# Patient Record
Sex: FEMALE | Race: WHITE | NOT HISPANIC OR LATINO | Employment: OTHER | URBAN - METROPOLITAN AREA
[De-identification: names, ages, dates, MRNs, and addresses within clinical notes are randomized per-mention and may not be internally consistent; named-entity substitution may affect disease eponyms.]

---

## 2023-08-01 ENCOUNTER — APPOINTMENT (OUTPATIENT)
Dept: RADIOLOGY | Facility: MEDICAL CENTER | Age: 68
DRG: 065 | End: 2023-08-01
Attending: EMERGENCY MEDICINE
Payer: COMMERCIAL

## 2023-08-01 ENCOUNTER — HOSPITAL ENCOUNTER (INPATIENT)
Facility: MEDICAL CENTER | Age: 68
LOS: 3 days | DRG: 065 | End: 2023-08-04
Attending: EMERGENCY MEDICINE | Admitting: HOSPITALIST
Payer: COMMERCIAL

## 2023-08-01 ENCOUNTER — APPOINTMENT (OUTPATIENT)
Dept: RADIOLOGY | Facility: MEDICAL CENTER | Age: 68
DRG: 065 | End: 2023-08-01
Attending: NURSE PRACTITIONER
Payer: COMMERCIAL

## 2023-08-01 DIAGNOSIS — I63.9 ACUTE CVA (CEREBROVASCULAR ACCIDENT) (HCC): ICD-10-CM

## 2023-08-01 DIAGNOSIS — I63.522 CEREBROVASCULAR ACCIDENT (CVA) DUE TO OCCLUSION OF LEFT ANTERIOR CEREBRAL ARTERY (HCC): ICD-10-CM

## 2023-08-01 DIAGNOSIS — I10 PRIMARY HYPERTENSION: ICD-10-CM

## 2023-08-01 PROBLEM — D69.6 THROMBOCYTOPENIA (HCC): Status: ACTIVE | Noted: 2023-08-01

## 2023-08-01 PROBLEM — E83.51 HYPOCALCEMIA: Status: ACTIVE | Noted: 2023-08-01

## 2023-08-01 LAB
ABO + RH BLD: NORMAL
ABO GROUP BLD: NORMAL
ALBUMIN SERPL BCP-MCNC: 3.6 G/DL (ref 3.2–4.9)
ALBUMIN/GLOB SERPL: 1.6 G/DL
ALP SERPL-CCNC: 77 U/L (ref 30–99)
ALT SERPL-CCNC: 18 U/L (ref 2–50)
AMPHET UR QL SCN: NEGATIVE
ANION GAP SERPL CALC-SCNC: 10 MMOL/L (ref 7–16)
APTT PPP: 23.4 SEC (ref 24.7–36)
AST SERPL-CCNC: 16 U/L (ref 12–45)
BARBITURATES UR QL SCN: NEGATIVE
BASOPHILS # BLD AUTO: 0.5 % (ref 0–1.8)
BASOPHILS # BLD: 0.04 K/UL (ref 0–0.12)
BENZODIAZ UR QL SCN: NEGATIVE
BILIRUB SERPL-MCNC: 0.4 MG/DL (ref 0.1–1.5)
BLD GP AB SCN SERPL QL: NORMAL
BUN SERPL-MCNC: 15 MG/DL (ref 8–22)
BZE UR QL SCN: NEGATIVE
CALCIUM ALBUM COR SERPL-MCNC: 8.5 MG/DL (ref 8.5–10.5)
CALCIUM SERPL-MCNC: 8.2 MG/DL (ref 8.5–10.5)
CANNABINOIDS UR QL SCN: NEGATIVE
CHLORIDE SERPL-SCNC: 111 MMOL/L (ref 96–112)
CO2 SERPL-SCNC: 24 MMOL/L (ref 20–33)
CREAT SERPL-MCNC: 0.64 MG/DL (ref 0.5–1.4)
EKG IMPRESSION: NORMAL
EOSINOPHIL # BLD AUTO: 0.11 K/UL (ref 0–0.51)
EOSINOPHIL NFR BLD: 1.3 % (ref 0–6.9)
ERYTHROCYTE [DISTWIDTH] IN BLOOD BY AUTOMATED COUNT: 41.9 FL (ref 35.9–50)
FENTANYL UR QL: NEGATIVE
GFR SERPLBLD CREATININE-BSD FMLA CKD-EPI: 97 ML/MIN/1.73 M 2
GLOBULIN SER CALC-MCNC: 2.2 G/DL (ref 1.9–3.5)
GLUCOSE SERPL-MCNC: 143 MG/DL (ref 65–99)
HCT VFR BLD AUTO: 44.7 % (ref 37–47)
HGB BLD-MCNC: 14.8 G/DL (ref 12–16)
IMM GRANULOCYTES # BLD AUTO: 0.06 K/UL (ref 0–0.11)
IMM GRANULOCYTES NFR BLD AUTO: 0.7 % (ref 0–0.9)
INR PPP: 1.04 (ref 0.87–1.13)
LYMPHOCYTES # BLD AUTO: 1.05 K/UL (ref 1–4.8)
LYMPHOCYTES NFR BLD: 12 % (ref 22–41)
MCH RBC QN AUTO: 30.1 PG (ref 27–33)
MCHC RBC AUTO-ENTMCNC: 33.1 G/DL (ref 32.2–35.5)
MCV RBC AUTO: 90.9 FL (ref 81.4–97.8)
METHADONE UR QL SCN: NEGATIVE
MONOCYTES # BLD AUTO: 0.47 K/UL (ref 0–0.85)
MONOCYTES NFR BLD AUTO: 5.4 % (ref 0–13.4)
NEUTROPHILS # BLD AUTO: 7.02 K/UL (ref 1.82–7.42)
NEUTROPHILS NFR BLD: 80.1 % (ref 44–72)
NRBC # BLD AUTO: 0 K/UL
NRBC BLD-RTO: 0 /100 WBC (ref 0–0.2)
OPIATES UR QL SCN: NEGATIVE
OXYCODONE UR QL SCN: NEGATIVE
PCP UR QL SCN: NEGATIVE
PLATELET # BLD AUTO: 156 K/UL (ref 164–446)
PMV BLD AUTO: 10.1 FL (ref 9–12.9)
POTASSIUM SERPL-SCNC: 3.6 MMOL/L (ref 3.6–5.5)
PROPOXYPH UR QL SCN: NEGATIVE
PROT SERPL-MCNC: 5.8 G/DL (ref 6–8.2)
PROTHROMBIN TIME: 13.5 SEC (ref 12–14.6)
RBC # BLD AUTO: 4.92 M/UL (ref 4.2–5.4)
RH BLD: NORMAL
SODIUM SERPL-SCNC: 145 MMOL/L (ref 135–145)
TROPONIN T SERPL-MCNC: 12 NG/L (ref 6–19)
WBC # BLD AUTO: 8.8 K/UL (ref 4.8–10.8)

## 2023-08-01 PROCEDURE — 85025 COMPLETE CBC W/AUTO DIFF WBC: CPT

## 2023-08-01 PROCEDURE — 99222 1ST HOSP IP/OBS MODERATE 55: CPT | Mod: FS | Performed by: NURSE PRACTITIONER

## 2023-08-01 PROCEDURE — 71045 X-RAY EXAM CHEST 1 VIEW: CPT

## 2023-08-01 PROCEDURE — 86901 BLOOD TYPING SEROLOGIC RH(D): CPT

## 2023-08-01 PROCEDURE — 93005 ELECTROCARDIOGRAM TRACING: CPT | Performed by: EMERGENCY MEDICINE

## 2023-08-01 PROCEDURE — 700117 HCHG RX CONTRAST REV CODE 255: Performed by: EMERGENCY MEDICINE

## 2023-08-01 PROCEDURE — 94760 N-INVAS EAR/PLS OXIMETRY 1: CPT

## 2023-08-01 PROCEDURE — 770020 HCHG ROOM/CARE - TELE (206)

## 2023-08-01 PROCEDURE — 70498 CT ANGIOGRAPHY NECK: CPT

## 2023-08-01 PROCEDURE — 80307 DRUG TEST PRSMV CHEM ANLYZR: CPT

## 2023-08-01 PROCEDURE — A9270 NON-COVERED ITEM OR SERVICE: HCPCS | Performed by: NURSE PRACTITIONER

## 2023-08-01 PROCEDURE — 85610 PROTHROMBIN TIME: CPT

## 2023-08-01 PROCEDURE — 99223 1ST HOSP IP/OBS HIGH 75: CPT | Mod: AI | Performed by: HOSPITALIST

## 2023-08-01 PROCEDURE — 86900 BLOOD TYPING SEROLOGIC ABO: CPT

## 2023-08-01 PROCEDURE — 84484 ASSAY OF TROPONIN QUANT: CPT

## 2023-08-01 PROCEDURE — 85730 THROMBOPLASTIN TIME PARTIAL: CPT

## 2023-08-01 PROCEDURE — 36415 COLL VENOUS BLD VENIPUNCTURE: CPT

## 2023-08-01 PROCEDURE — 70450 CT HEAD/BRAIN W/O DYE: CPT

## 2023-08-01 PROCEDURE — 70551 MRI BRAIN STEM W/O DYE: CPT

## 2023-08-01 PROCEDURE — 70496 CT ANGIOGRAPHY HEAD: CPT

## 2023-08-01 PROCEDURE — 0042T CT-CEREBRAL PERFUSION ANALYSIS: CPT

## 2023-08-01 PROCEDURE — 80053 COMPREHEN METABOLIC PANEL: CPT

## 2023-08-01 PROCEDURE — 700102 HCHG RX REV CODE 250 W/ 637 OVERRIDE(OP): Performed by: NURSE PRACTITIONER

## 2023-08-01 PROCEDURE — 99285 EMERGENCY DEPT VISIT HI MDM: CPT

## 2023-08-01 PROCEDURE — 700111 HCHG RX REV CODE 636 W/ 250 OVERRIDE (IP): Performed by: NURSE PRACTITIONER

## 2023-08-01 PROCEDURE — 86850 RBC ANTIBODY SCREEN: CPT

## 2023-08-01 RX ORDER — SODIUM CHLORIDE 9 MG/ML
500 INJECTION, SOLUTION INTRAVENOUS
Status: DISCONTINUED | OUTPATIENT
Start: 2023-08-01 | End: 2023-08-04 | Stop reason: HOSPADM

## 2023-08-01 RX ORDER — LABETALOL HYDROCHLORIDE 5 MG/ML
10 INJECTION, SOLUTION INTRAVENOUS
Status: DISCONTINUED | OUTPATIENT
Start: 2023-08-01 | End: 2023-08-02

## 2023-08-01 RX ORDER — ATORVASTATIN CALCIUM 80 MG/1
80 TABLET, FILM COATED ORAL EVERY EVENING
Status: DISCONTINUED | OUTPATIENT
Start: 2023-08-01 | End: 2023-08-04 | Stop reason: HOSPADM

## 2023-08-01 RX ORDER — HYDRALAZINE HYDROCHLORIDE 20 MG/ML
10 INJECTION INTRAMUSCULAR; INTRAVENOUS
Status: DISCONTINUED | OUTPATIENT
Start: 2023-08-01 | End: 2023-08-02

## 2023-08-01 RX ORDER — CHLORAL HYDRATE 500 MG
1000 CAPSULE ORAL DAILY
COMMUNITY

## 2023-08-01 RX ORDER — ASPIRIN 300 MG/1
300 SUPPOSITORY RECTAL DAILY
Status: DISCONTINUED | OUTPATIENT
Start: 2023-08-02 | End: 2023-08-02

## 2023-08-01 RX ORDER — LAMOTRIGINE 150 MG/1
150 TABLET ORAL DAILY
COMMUNITY
Start: 2023-06-16 | End: 2023-12-19 | Stop reason: SDUPTHER

## 2023-08-01 RX ORDER — CALCIUM CARBONATE 500 MG/1
500 TABLET, CHEWABLE ORAL DAILY
Status: ACTIVE | OUTPATIENT
Start: 2023-08-01 | End: 2023-08-03

## 2023-08-01 RX ORDER — LAMOTRIGINE 100 MG/1
150 TABLET ORAL EVERY EVENING
Status: DISCONTINUED | OUTPATIENT
Start: 2023-08-01 | End: 2023-08-04 | Stop reason: HOSPADM

## 2023-08-01 RX ORDER — ASPIRIN 325 MG
325 TABLET ORAL ONCE
Status: COMPLETED | OUTPATIENT
Start: 2023-08-01 | End: 2023-08-01

## 2023-08-01 RX ORDER — LORAZEPAM 2 MG/ML
1 INJECTION INTRAMUSCULAR ONCE
Status: COMPLETED | OUTPATIENT
Start: 2023-08-01 | End: 2023-08-01

## 2023-08-01 RX ORDER — ASPIRIN 81 MG/1
81 TABLET, CHEWABLE ORAL DAILY
Status: DISCONTINUED | OUTPATIENT
Start: 2023-08-02 | End: 2023-08-02

## 2023-08-01 RX ADMIN — LORAZEPAM 1 MG: 2 INJECTION INTRAMUSCULAR; INTRAVENOUS at 22:15

## 2023-08-01 RX ADMIN — IOHEXOL 40 ML: 350 INJECTION, SOLUTION INTRAVENOUS at 09:19

## 2023-08-01 RX ADMIN — ASPIRIN 325 MG: 325 TABLET ORAL at 10:05

## 2023-08-01 RX ADMIN — IOHEXOL 80 ML: 350 INJECTION, SOLUTION INTRAVENOUS at 09:20

## 2023-08-01 ASSESSMENT — PAIN DESCRIPTION - PAIN TYPE
TYPE: ACUTE PAIN
TYPE: ACUTE PAIN

## 2023-08-01 ASSESSMENT — ENCOUNTER SYMPTOMS
EYES NEGATIVE: 1
BRUISES/BLEEDS EASILY: 0
NEUROLOGICAL NEGATIVE: 1
WEIGHT LOSS: 0
CHILLS: 0
FOCAL WEAKNESS: 0
DEPRESSION: 0
COUGH: 0
CARDIOVASCULAR NEGATIVE: 1
VOMITING: 0
NAUSEA: 0
PSYCHIATRIC NEGATIVE: 1
BLURRED VISION: 0
CONSTITUTIONAL NEGATIVE: 1
MYALGIAS: 0
DIZZINESS: 0
ABDOMINAL PAIN: 0
FEVER: 0
PALPITATIONS: 0
WEAKNESS: 0
HEADACHES: 0
RESPIRATORY NEGATIVE: 1
GASTROINTESTINAL NEGATIVE: 1
MUSCULOSKELETAL NEGATIVE: 1
SHORTNESS OF BREATH: 0
DIAPHORESIS: 0
SORE THROAT: 0

## 2023-08-01 ASSESSMENT — LIFESTYLE VARIABLES: SUBSTANCE_ABUSE: 0

## 2023-08-01 NOTE — ED TRIAGE NOTES
"Chief Complaint   Patient presents with    Extremity Weakness     Pt woke today at 07:15 with RUE and RLE weakness, LKW last night approx 23:00, RUE symptoms have improved on arrival and only has mild drift, pt is unable to move RLE, sensation intact.      Pt BIB [facility] for above complaint, VSS on 2L NC, GCS 15, NAD.     Hx TIA/stroke and epilepsy, pt takes lamictal     BP (!) 160/88   Pulse 65   Temp 36.6 °C (97.8 °F) (Temporal)   Resp 16   Ht 1.702 m (5' 7\")   Wt 70.8 kg (156 lb)   SpO2 95%   BMI 24.43 kg/m²     "

## 2023-08-01 NOTE — ASSESSMENT & PLAN NOTE
Acute right sided weakness  improving  Neurology following  Started on Eliquis  Started on Lipitor 80  Continuous cardiac monitoring and 2D echo  Mri brain reveals acute infarct in left medial frontal region in the left posterior CAROL ANN distribution  Serial neuro exams  Speech, PT and OT to eval

## 2023-08-01 NOTE — CONSULTS
Chief Complaint   Patient presents with    Extremity Weakness     Pt woke today at 07:15 with RUE and RLE weakness, LKW last night approx 23:00, RUE symptoms have improved on arrival and only has mild drift, pt is unable to move RLE, sensation intact.        Problem List Items Addressed This Visit    None  Visit Diagnoses       Cerebrovascular accident (CVA) due to occlusion of left anterior cerebral artery (HCC)                Neurology Stroke Alert Consultation     History of present illness:  This is a 67-year old female with PMHx significant for seizure disorder (reportedly nocturnal seizures) secondary to Right frontal stroke (2016; on Lamictal), dyslipidemia, further details limited who presented to [facility] on 8/[facility]/23 for a chief complaint of Right LE > Right UE weakness. The patient states that she went to sleep in her usual state of health last night at 200; when she awoke at 0715, she had difficulty moving her RUE/RLE. She called EMS; on scene BG WNL, SBP 120s. She was reportedly profoundly weak to both RUE/RLE; en route symptoms to RUE improved; RLE remains nearly flaccid at time of arrival. Here, Stat CT head revealed no acute intracranial abnormality; did note encephalomalacia to Right frontal region consistent with chronic stroke. CTA head/neck with no obvious LVO nor hemodynamically significant stenosis; CT perfusion study with small perfusion mismatch well correlating to RLE weakness/Left CAROL ANN territory; unfortunately patient not a candidate for IV thrombolytics secondary to presentation time greater than 4.5 hours from time LKW; no IR intervention given no LVO. Currently patient is sitting up in bed; awake and alert; still with RLE>RUE weakness. She denies numbness, problem with vision, speech or swallowing, headache or dizziness.     Neurology has been consulted by Dr. Bimal Ramon to further evaluate the findings noted above.     Past medical history:   Past Medical History:   Diagnosis  Date    Hyperlipidemia     Muscle disorder     Neuropathy (HCC)     from right shoulder injury    Seizure disorder (HCC)     10/25/2019     Stroke (HCC)     01/28/2019     Substance abuse (HCC)        Past surgical history:   Past Surgical History:   Procedure Laterality Date    ME COLONOSCOPY,DIAGNOSTIC N/A 9/28/2021    Procedure: COLONOSCOPY;  Surgeon: Nir Hemphill M.D.;  Location: SURGERY [facility];  Service: Gastroenterology    ME NEUROPLASTY & OR TRANSPOS MEDIAN NRV CARPAL KEVIN Left 1/21/2020    Procedure: Left carpal tunnel release.;  Surgeon: Nir Marin M.D.;  Location: SURGERY [facility];  Service: Orthopedics       Family history:   Family History   Problem Relation Age of Onset    Hypertension Mother     Dementia Mother     Heart Failure Mother     Heart Failure Father     Heart Attack Father     Kidney Disease Father     Hypertension Father        Social history:   Social History     Socioeconomic History    Marital status:      Spouse name: Not on file    Number of children: Not on file    Years of education: Not on file    Highest education level: Not on file   Occupational History    Not on file   Tobacco Use    Smoking status: Never    Smokeless tobacco: Never   Vaping Use    Vaping Use: Never used   Substance and Sexual Activity    Alcohol use: Yes    Drug use: Yes     Types: Inhaled     Comment: Marijuana    Sexual activity: Not on file   Other Topics Concern    Not on file   Social History Narrative    Not on file     Social Determinants of Health     Financial Resource Strain: Not on file   Food Insecurity: Not on file   Transportation Needs: Not on file   Physical Activity: Not on file   Stress: Not on file   Social Connections: Not on file   Intimate Partner Violence: Not on file   Housing Stability: Not on file       Current medications:   No current facility-administered medications for this encounter.     Current Outpatient Medications   Medication    Omega-3 Fatty Acids  "(FISH OIL) 1000 MG Cap capsule    VITAMIN A PO    B Complex Vitamins (VITAMIN B COMPLEX PO)    VITAMIN D PO    Ascorbic Acid (VITAMIN C PO)    multivitamin Tab    lamotrigine (LAMICTAL) 150 MG tablet       Medication Allergy:  No Known Allergies    Review of systems:   Constitutional: denies fever, night sweats, weight loss.   Eyes: denies acute vision change, eye pain or secretion.   Ears, Nose, Mouth, Throat: denies nasal secretion, nasal bleeding, difficulty swallowing, hearing loss, tinnitus, vertigo, ear pain, acute dental problems, oral ulcers or lesions.   Endocrine: denies recent weight changes, heat or cold intolerance, polyuria, polydypsia, polyphagia,abnormal hair growth.  Cardiovascular: denies new onset of chest pain, palpitations, syncope, or dyspnea of exertion.  Pulmonary: denies shortness of breath, new onset of cough, hemoptysis, wheezing, chest pain or flu-like symptoms.   GI: denies nausea, vomiting, diarrhea, GI bleeding, change in appetite, abdominal pain, and change in bowel habits.  : denies dysuria, urinary incontinence, hematuria.  Heme/oncology: denies history of easy bruising or bleeding. No history of cancer, DVTor PE.  Allergy/immunology: denies hives/urticaria, or itching.   Dermatologic: denies new rash, or new skin lesions.  Musculoskeletal:denies joint swelling or pain, muscle pain, neck and back pain.   Neurologic: As noted in detail above.   Psychiatric: denies symptoms of depression, anxiety, hallucinations, mood swings or changes, suicidal or homicidal thoughts.     Physical examination:   Vitals:    08/01/23 0903 08/01/23 0905 08/01/23 0914 08/01/23 1003   BP:  (!) 160/88 (!) 183/88 (!) 170/83   Pulse:  65 75 70   Resp:  16     Temp:  36.6 °C (97.8 °F)     TempSrc:  Temporal     SpO2:  95% 95% 95%   Weight: 70.8 kg (156 lb)      Height: 1.702 m (5' 7\")        General: Patient in no acute distress.  HEENT: Normocephalic, no signs of acute trauma.   Neck: supple, no meningeal " signs or carotid bruits. There is normal range of motion. No tenderness on exam.   Chest: clear to auscultation. No cough.   CV: RRR, no murmurs.   Skin: no signs of acute rashes or trauma.   Musculoskeletal: joints exhibit full range of motion, without any pain to palpation. There are no signs of joint or muscle swelling. There is no tenderness to deep palpation of muscles.   Psychiatric: No hallucinatory behavior.       NEUROLOGICAL EXAM:   Mental status, orientation: Awake, alert and fully oriented.   Speech and language: speech is clear and fluent. The patient is able to name, repeat and comprehend.    Cranial nerve exam: Pupils are 3-4 mm bilaterally and equally reactive to light. Visual fields are intact by confrontation. There is no nystagmus on primary or secondary gaze. Intact full EOM in all directions of gaze.  Face appears symmetric. Sensation in the face is intact to light touch. Uvula is midline. Palate elevates symmetrically. Tongue is midline and without any signs of tongue biting or fasciculations. Shoulder shrug is intact bilaterally.   Motor exam: Strength is 5/5 in LUE/LLE; 4+/5 to RUE with slight drift; 2+/5 to RLE, not antigravity. Tone is normal. No abnormal movements were seen on exam.   Sensory exam reveals normal sense of light touch and pinprick in all extremities.   Deep tendon reflexes:  Plantar responses are flexor. There is no clonus.   Coordination: shows a normal finger-nose-finger.   Gait:Not assessed at this time as patient is a fall risk.       NIH Stroke Scale    1a Level of Consciousness   1b Orientation Questions   1c Response to Commands   2 Gaze   3 Visual Fields   4 Facial Movement   5 Motor Function (arm)   a Left   b Right 1  6 Motor Function (leg)   a Left   b Right 3  7 Limb Ataxia   8 Sensory   9 Language   10 Articulation   11 Extinction/Inattention     Score: 4        ANCILLARY DATA REVIEWED:     Lab Data Review:  Recent Results (from the past 24 hour(s))   CBC WITH  DIFFERENTIAL    Collection Time: 08/01/23  8:55 AM   Result Value Ref Range    WBC 8.8 4.8 - 10.8 K/uL    RBC 4.92 4.20 - 5.40 M/uL    Hemoglobin 14.8 12.0 - 16.0 g/dL    Hematocrit 44.7 37.0 - 47.0 %    MCV 90.9 81.4 - 97.8 fL    MCH 30.1 27.0 - 33.0 pg    MCHC 33.1 32.2 - 35.5 g/dL    RDW 41.9 35.9 - 50.0 fL    Platelet Count 156 (L) 164 - 446 K/uL    MPV 10.1 9.0 - 12.9 fL    Neutrophils-Polys 80.10 (H) 44.00 - 72.00 %    Lymphocytes 12.00 (L) 22.00 - 41.00 %    Monocytes 5.40 0.00 - 13.40 %    Eosinophils 1.30 0.00 - 6.90 %    Basophils 0.50 0.00 - 1.80 %    Immature Granulocytes 0.70 0.00 - 0.90 %    Nucleated RBC 0.00 0.00 - 0.20 /100 WBC    Neutrophils (Absolute) 7.02 1.82 - 7.42 K/uL    Lymphs (Absolute) 1.05 1.00 - 4.80 K/uL    Monos (Absolute) 0.47 0.00 - 0.85 K/uL    Eos (Absolute) 0.11 0.00 - 0.51 K/uL    Baso (Absolute) 0.04 0.00 - 0.12 K/uL    Immature Granulocytes (abs) 0.06 0.00 - 0.11 K/uL    NRBC (Absolute) 0.00 K/uL   COMP METABOLIC PANEL    Collection Time: 08/01/23  8:55 AM   Result Value Ref Range    Sodium 145 135 - 145 mmol/L    Potassium 3.6 3.6 - 5.5 mmol/L    Chloride 111 96 - 112 mmol/L    Co2 24 20 - 33 mmol/L    Anion Gap 10.0 7.0 - 16.0    Glucose 143 (H) 65 - 99 mg/dL    Bun 15 8 - 22 mg/dL    Creatinine 0.64 0.50 - 1.40 mg/dL    Calcium 8.2 (L) 8.5 - 10.5 mg/dL    Correct Calcium 8.5 8.5 - 10.5 mg/dL    AST(SGOT) 16 12 - 45 U/L    ALT(SGPT) 18 2 - 50 U/L    Alkaline Phosphatase 77 30 - 99 U/L    Total Bilirubin 0.4 0.1 - 1.5 mg/dL    Albumin 3.6 3.2 - 4.9 g/dL    Total Protein 5.8 (L) 6.0 - 8.2 g/dL    Globulin 2.2 1.9 - 3.5 g/dL    A-G Ratio 1.6 g/dL   PROTHROMBIN TIME    Collection Time: 08/01/23  8:55 AM   Result Value Ref Range    PT 13.5 12.0 - 14.6 sec    INR 1.04 0.87 - 1.13   APTT    Collection Time: 08/01/23  8:55 AM   Result Value Ref Range    APTT 23.4 (L) 24.7 - 36.0 sec   COD (ADULT)    Collection Time: 08/01/23  8:55 AM   Result Value Ref Range    ABO Grouping Only A      Rh Grouping Only POS     Antibody Screen-Cod NEG    TROPONIN    Collection Time: 23  8:55 AM   Result Value Ref Range    Troponin T 12 6 - 19 ng/L   ESTIMATED GFR    Collection Time: 23  8:55 AM   Result Value Ref Range    GFR (CKD-EPI) 97 >60 mL/min/1.73 m 2   EKG (NOW)    Collection Time: 23  9:52 AM   Result Value Ref Range    Report       [facility] Emergency Dept[facility]    Test Date:  2023  Pt Name:    YOLY BOGGS                 Department: ER  MRN:        [id number]                      Room:        10  Gender:     Female                       Technician: [id number]  :        [id number]                   Requested By:JACOB AMOS  Order #:    [id number]                    Reading MD:    Measurements  Intervals                                Axis  Rate:       66                           P:          74  WV:         196                          QRS:        54  QRSD:       96                           T:          49  QT:         433  QTc:        454    Interpretive Statements  Sinus rhythm  Atrial premature complex  Probable left ventricular hypertrophy  No previous ECG available for comparison     ABO Rh Confirm    Collection Time: 23  9:57 AM   Result Value Ref Range    ABO Rh Confirm A POS        Labs reviewed by me.         Imaging reviewed by me:     DX-CHEST-PORTABLE (1 VIEW)   Final Result         Linear left basilar atelectasis      CT-CTA NECK WITH & W/O-POST PROCESSING   Final Result      CT angiogram of the neck within normal limits.      CT-CTA HEAD WITH & W/O-POST PROCESS   Final Result      1.  Occlusion of distal left pericallosal artery.      2.  Moderate size chronic area of infarction in right anterior frontal lobe.      3.  Case discussed with Dr. Ahmadi at 9:35 AM 2023.      CT-CEREBRAL PERFUSION ANALYSIS   Final Result      1.  Cerebral blood flow less than 30% likely representing completed infarct = 0 mL.      2.  T Max more than 6  seconds likely representing combination of completed infarct and ischemia = 3 mL.      3.  Mismatched volume likely representing ischemic brain/penumbra = 3      4.  Please note that the cerebral perfusion was performed on the limited brain tissue around the basal ganglia region. Infarct/ischemia outside the CT perfusion sections can be missed in this study.      CT-HEAD W/O   Final Result      1.  Cerebral atrophy.      2.  White matter lucencies most consistent with small vessel ischemic change versus demyelination or gliosis.      3. Moderate wedge-shaped area of chronic infarction in the right anterior frontal lobe.         MR-BRAIN-W/O    (Results Pending)         Presumed mechanism by TOAST:  __Large Artery Atherosclerosis  __Small Vessel (Lacunar)  __Cardioembolic  __Other (Sickle Cell, Vasculitis, Hypercoagulable)  _X_Unknown    Modified Louisa Scale (MRS): 1 = No significant disability, despite symptoms; able to perform all usual duties and activities      ASSESSMENT AND PLAN:  67-year old female with PMHx significant for seizure disorder (reportedly nocturnal seizures) secondary to Right frontal stroke (2016; on Lamictal), dyslipidemia, further details limited who presented to [facility] on 8/1/23 for a chief complaint of Right LE > Right UE weakness; LKW 2300/bed time last night. Here, CT head revealed no acute intracranial abnormality; did note encephalomalacia to Right frontal region consistent with chronic stroke. CTA head/neck with no obvious LVO nor hemodynamically significant stenosis; CT perfusion study with small perfusion mismatch well correlating to RLE weakness/Left CAROL ANN territory; unfortunately patient not a candidate for IV thrombolytics secondary to presentation time greater than 4.5 hours from time LKW; no IR intervention given no LVO. Plan for admission for MRI Brain, stroke work up; will likely/tentatively plan to start empiric anticoagulation in coming 1-2 days given presumed  second cortical/embolic appearing stroke (note, patient admits to non compliance with ASA/Statin at home).     Recommendations/Plan:    -q4h and PRN neuro assessment. VS per nursing/unit protocol. BP goal is normotension, 100-130/60-80. Antihypertensives per primary team.   -Obtain MRI Brain wo contrast.   -Telemetry; currently SR. Screen for Afib/arrhythmia. Obtain TTE.  - mg PO q once now. Likely/tentative plan to start Eliquis 5 mg PO BID tomorrow or 8/3.    -Atorvastatin 80 mg PO q HS. Check lipid panel.   -Recommend aggressive BG management per primary team. Avoid IVF with Dextrose. BG goal 140-180. Check hemoglobin A1c.   -PT/OT/SLP eval and treat. Physiatry consult.   -Counseled patient at length regarding life style and risk factor modification for secondary stroke prevention, including importance of medication compliance.   -Continue home dose Lamictal for seizure ppx.   -All other medical management per primary team.   -DVT PPX: SCDs.      The plan of care above has been discussed with Dr. Russ Laura. Please call with questions.     JAYLA Martinez.  [facility] [employer]

## 2023-08-01 NOTE — ED NOTES
Neuro assessment complete, no changes since 10am. Patient updated on POC, VSS, NAD, denies any needs. Call light in hand.

## 2023-08-01 NOTE — ED NOTES
Family @ bedside. Pt remains calm, cooperative and appears to be resting comfortably. Pt has no additional requests at this time.

## 2023-08-01 NOTE — DISCHARGE PLANNING
[facility] Transitional Care Coordination    Referral from: Dr Bello  Insurance Provider on Facesheet: [facility]  Potential Rehab Diagnosis: Stroke    Chart review indicates patient may have on going medical management and may have therapy needs to possibly meet inpatient rehab facility criteria with the goal of returning to community.    D/C support: TBD     Physiatry consultation pended per protocol.     R/o stroke pending MRI and therapy evals as appropriate, TCC will follow.     Thank you for the referral.

## 2023-08-01 NOTE — ED PROVIDER NOTES
ED Provider Note  CHIEF COMPLAINT  Chief Complaint   Patient presents with    Extremity Weakness     Pt woke today at 07:15 with RUE and RLE weakness, LKW last night approx 23:00, RUE symptoms have improved on arrival and only has mild drift, pt is unable to move RLE, sensation intact.        HPI  Doreen Noe is a 67 y.o. female who presents for evaluation of of right lower extremity weakness which was present upon waking this morning around 7:15 AM.  Last known well was around 11:00 last night when the patient went to bed.  She notes she woke up and was unable to move her right lower extremity.  EMS found the patient's leg to be flaccid and they also thought there might be some right upper extremity weakness as well.  She was speaking normally and swallowing normally.  She had no facial droop but was medevac from her home, approximately an hour and a half by air, directly here for stroke evaluation.  EXTERNAL RECORDS REVIEWED  Reviewed office visit to [facility] for seizures May 2023.  Patient currently on Lamictal 150 mg daily and apparently had the CVA around 8 years ago.  She has a residual tremor on the left side of her body from the stroke.  ROS  Constitutional: No fevers or chills  Skin: No rashes  HEENT: No sore throat, runny nose  Neck: No neck pain  Chest: No pain or rashes  Pulm: No shortness of breath, cough, wheezing, stridor, or pain with inspiration/expiration  Gastrointestinal: No nausea, vomiting, diarrhea, constipation, bloating, melena, hematochezia or abdominal pain.  Genitourinary: No dysuria or hematuria  Musculoskeletal: No pain, swelling, or weakness  Neurologic: Right lower extremity weakness.  No confusion or disorientation.  Heme: No bleeding or bruising problems.   Immuno: No hx of recurrent infections        LIMITATION TO HISTORY   None  OUTSIDE HISTORIAN(S):  EMS/flight crew        PAST FAM HISTORY  Family History   Problem Relation Age of Onset    Hypertension Mother  "    Dementia Mother     Heart Failure Mother     Heart Failure Father     Heart Attack Father     Kidney Disease Father     Hypertension Father        PAST MEDICAL HISTORY   has a past medical history of Hyperlipidemia, Muscle disorder, Neuropathy (HCC), Seizure disorder (HCC), Stroke (HCC), Substance abuse (HCC), and Thrombocytopenia (HCC) (8/1/2023).    SOCIAL HISTORY  Social History     Tobacco Use    Smoking status: Never    Smokeless tobacco: Never   Vaping Use    Vaping Use: Never used   Substance and Sexual Activity    Alcohol use: Yes    Drug use: Yes     Types: Inhaled     Comment: Marijuana    Sexual activity: Not on file       SURGICAL HISTORY   has a past surgical history that includes neuroplasty & or transpos median nrv carpal gerardo (Left, 1/21/2020) and colonoscopy,diagnostic (N/A, 9/28/2021).    CURRENT MEDICATIONS  Home Medications       Reviewed by Christine House (Pharmacy Tech) on 08/01/23 at 0954  Med List Status: Complete     Medication Last Dose Status   Ascorbic Acid (VITAMIN C PO) 7/31/2023 Active   B Complex Vitamins (VITAMIN B COMPLEX PO) 7/31/2023 Active   lamotrigine (LAMICTAL) 150 MG tablet 7/31/2023 Active   multivitamin Tab 7/31/2023 Active   Omega-3 Fatty Acids (FISH OIL) 1000 MG Cap capsule 7/31/2023 Active   VITAMIN A PO 7/31/2023 Active   VITAMIN D PO 7/31/2023 Active                     ALLERGIES  No Known Allergies    PHYSICAL EXAM  VITAL SIGNS: BP (!) 196/88   Pulse 63   Temp 36.6 °C (97.8 °F) (Temporal)   Resp 16   Ht 1.702 m (5' 7\")   Wt 70.8 kg (156 lb)   SpO2 93%   BMI 24.43 kg/m²    Gen: Alert in no apparent distress.  HEENT: No signs of trauma, Bilateral external ears normal, Nose normal. Conjunctiva normal, Non-icteric.   Neck:  No tenderness, Supple, No masses  Lymphatic: No cervical lymphadenopathy noted.   Cardiovascular: Regular rate and rhythm, no murmurs.  Capillary refill less than 3 seconds to all extremities, 2+ distal pulses.  Thorax & Lungs: Normal " breath sounds, No respiratory distress, No wheezing bilateral chest rise  Abdomen: Bowel sounds normal, Soft, No tenderness, No masses, No pulsatile masses. No Guarding or rebound  Skin: Warm, Dry, No erythema, No rash noted to exposed areas.   Back: No bony tenderness, No CVA tenderness.   Extremities: Intact distal pulses, No edema  Neurologic:   CN 2: Visual acuity grossly intact, visual fields grossly intact  CN 2-3: Pupils equal round reactive to light and accommodation  CN 3, 4, 6: Extraocular eye movements intact, no lid lag  CN 5: Facial sensation intact to light touch bilaterally  CN 7: Face symmetric, no droop  CN 8: Hearing intact to finger rub bilaterally  CN 9,10: Swallowing normally, soft palate elevates normally  CN 4, 7, 10, 12: Voice normal, no dysarthria  CN 11: Strong shoulder shrug, good strength with head rotation  CN 12: No deviation of the tongue    Motor:   RUE: 5 out of 5 strength proximally and distally, no pronator or arm drift  LUE:5 out of 5 strength proximally and distally, no pronator or arm drift  RLE:0 out of 5 strength proximally and distally  LLE:5 out of 5 strength proximally and distally, no leg drift    Sensory:  RUE: Sensation intact to light touch, equal bilat  RLE:  Sensation intact to light touch, equal bilat  LUE: Sensation intact to light touch, equal bilat  LLE: Sensation intact to light touch, equal bilat   Psychiatric: Affect pleasant    INITIAL IMPRESSION  Patient arrives for evaluation of appears to be isolated right lower extremity weakness.  It is fairly profound and she has 0 out of 5 strength.  She has an NIH of 4 based on initial evaluation at the charge desk and was taken directly to CT/CTA.  She was seen and evaluated by the stroke team at the charge desk as well.  Patient is well out of the timeframe for systemic thrombolysis by the time of arrival, at least by the last known well.  LABS  Results for orders placed or performed during the hospital encounter of  08/01/23   CBC WITH DIFFERENTIAL   Result Value Ref Range    WBC 8.8 4.8 - 10.8 K/uL    RBC 4.92 4.20 - 5.40 M/uL    Hemoglobin 14.8 12.0 - 16.0 g/dL    Hematocrit 44.7 37.0 - 47.0 %    MCV 90.9 81.4 - 97.8 fL    MCH 30.1 27.0 - 33.0 pg    MCHC 33.1 32.2 - 35.5 g/dL    RDW 41.9 35.9 - 50.0 fL    Platelet Count 156 (L) 164 - 446 K/uL    MPV 10.1 9.0 - 12.9 fL    Neutrophils-Polys 80.10 (H) 44.00 - 72.00 %    Lymphocytes 12.00 (L) 22.00 - 41.00 %    Monocytes 5.40 0.00 - 13.40 %    Eosinophils 1.30 0.00 - 6.90 %    Basophils 0.50 0.00 - 1.80 %    Immature Granulocytes 0.70 0.00 - 0.90 %    Nucleated RBC 0.00 0.00 - 0.20 /100 WBC    Neutrophils (Absolute) 7.02 1.82 - 7.42 K/uL    Lymphs (Absolute) 1.05 1.00 - 4.80 K/uL    Monos (Absolute) 0.47 0.00 - 0.85 K/uL    Eos (Absolute) 0.11 0.00 - 0.51 K/uL    Baso (Absolute) 0.04 0.00 - 0.12 K/uL    Immature Granulocytes (abs) 0.06 0.00 - 0.11 K/uL    NRBC (Absolute) 0.00 K/uL   COMP METABOLIC PANEL   Result Value Ref Range    Sodium 145 135 - 145 mmol/L    Potassium 3.6 3.6 - 5.5 mmol/L    Chloride 111 96 - 112 mmol/L    Co2 24 20 - 33 mmol/L    Anion Gap 10.0 7.0 - 16.0    Glucose 143 (H) 65 - 99 mg/dL    Bun 15 8 - 22 mg/dL    Creatinine 0.64 0.50 - 1.40 mg/dL    Calcium 8.2 (L) 8.5 - 10.5 mg/dL    Correct Calcium 8.5 8.5 - 10.5 mg/dL    AST(SGOT) 16 12 - 45 U/L    ALT(SGPT) 18 2 - 50 U/L    Alkaline Phosphatase 77 30 - 99 U/L    Total Bilirubin 0.4 0.1 - 1.5 mg/dL    Albumin 3.6 3.2 - 4.9 g/dL    Total Protein 5.8 (L) 6.0 - 8.2 g/dL    Globulin 2.2 1.9 - 3.5 g/dL    A-G Ratio 1.6 g/dL   PROTHROMBIN TIME   Result Value Ref Range    PT 13.5 12.0 - 14.6 sec    INR 1.04 0.87 - 1.13   APTT   Result Value Ref Range    APTT 23.4 (L) 24.7 - 36.0 sec   COD (ADULT)   Result Value Ref Range    ABO Grouping Only A     Rh Grouping Only POS     Antibody Screen-Cod NEG    TROPONIN   Result Value Ref Range    Troponin T 12 6 - 19 ng/L   ABO Rh Confirm   Result Value Ref Range    ABO Rh  Confirm A POS    ESTIMATED GFR   Result Value Ref Range    GFR (CKD-EPI) 97 >60 mL/min/1.73 m 2   URINE DRUG SCREEN   Result Value Ref Range    Amphetamines Urine Negative Negative    Barbiturates Negative Negative    Benzodiazepines Negative Negative    Cocaine Metabolite Negative Negative    Fentanyl, Urine Negative Negative    Methadone Negative Negative    Opiates Negative Negative    Oxycodone Negative Negative    Phencyclidine -Pcp Negative Negative    Propoxyphene Negative Negative    Cannabinoid Metab Negative Negative   EKG (NOW)   Result Value Ref Range    Report       [facility] Emergency Dept.    Test Date:  2023  Pt Name:    YOLY BOGGS                 Department: ER  MRN:        [id number]                      Room:       RD 10  Gender:     Female                       Technician: [id number]  :        [id number]                   Requested By:JACOB AMOS  Order #:    [id number]                    Reading MD:    Measurements  Intervals                                Axis  Rate:       66                           P:          74  NC:         196                          QRS:        54  QRSD:       96                           T:          49  QT:         433  QTc:        454    Interpretive Statements  Sinus rhythm  Atrial premature complex  Probable left ventricular hypertrophy  No previous ECG available for comparison       I have independently interpreted this EKG  Sinus rhythm rate of 66, intervals appear to be within normal limits, there are no ST or T wave changes to suggest ischemia, there is 1 atrial premature complex.  There is no old EKG to compare  RADIOLOGY  DX-CHEST-PORTABLE (1 VIEW)   Final Result         Linear left basilar atelectasis      CT-CTA NECK WITH & W/O-POST PROCESSING   Final Result      CT angiogram of the neck within normal limits.      CT-CTA HEAD WITH & W/O-POST PROCESS   Final Result      1.  Occlusion of distal left pericallosal artery.      2.   Moderate size chronic area of infarction in right anterior frontal lobe.      3.  Case discussed with Dr. Ahmadi at 9:35 AM 8/1/2023.      CT-CEREBRAL PERFUSION ANALYSIS   Final Result      1.  Cerebral blood flow less than 30% likely representing completed infarct = 0 mL.      2.  T Max more than 6 seconds likely representing combination of completed infarct and ischemia = 3 mL.      3.  Mismatched volume likely representing ischemic brain/penumbra = 3      4.  Please note that the cerebral perfusion was performed on the limited brain tissue around the basal ganglia region. Infarct/ischemia outside the CT perfusion sections can be missed in this study.      CT-HEAD W/O   Final Result      1.  Cerebral atrophy.      2.  White matter lucencies most consistent with small vessel ischemic change versus demyelination or gliosis.      3. Moderate wedge-shaped area of chronic infarction in the right anterior frontal lobe.         MR-BRAIN-W/O    (Results Pending)   EC-ECHOCARDIOGRAM COMPLETE W/O CONT    (Results Pending)     I have independently interpreted the diagnostic imaging associated with this visit and am waiting the final reading from the radiologist.   My preliminary interpretation is a follows: Single view chest x-ray: There are no convincing pulmonary opacities to suggest infiltrates or effusions.  There is no pneumothorax.  Cardiomediastinal silhouette appears to be within normal limits.  No bony abnormalities    Critical Care Note  Upon my evaluation, this patient had high probability of imminent and life-threatening deterioration due to ischemic CVA, which required my direct attention, intervention, and personal management. I personally provided 32 minutes of critical care time exclusive of time spent on separately billable procedures. Time includes review of laboratory data, radiology results, discussion with consultants, and monitoring for potential decompensation.      COURSE & MEDICAL DECISION  MAKING  Pertinent Labs & Imaging studies reviewed. (See chart for details)  ED observation? No  Patient's imaging was concerning for an area near the left thalamic region suggestive of an acute ischemic CVA.  CTA did not demonstrate any intervenable lesions or clots.  Case was discussed with the stroke neurologist, Dr. Laura, who also reviewed all the imaging.  There is no intervention indicated at this time so the patient will be admitted to the hospitalist service for further treatment and evaluation of an ischemic CVA.  She did not experience any neurologic or hemodynamic deterioration in the emergency department but did remain moderately hypertensive.  We allowed permissive hypertension due to the ischemic nature of the CVA.  There were no other findings to suggest a more likely cause for the patient's symptoms.      I have discussed management of the patient with the following physicians and ORVILLE's: Neurologist, hospitalist    Escalation of care considered, and ultimately not performed: MRI, neuro interventional procedures were considered but felt to be not indicated at this time.    Barriers to care at this time, including but not limited to: None.     Decision tools and Rx drugs considered including, but not limited to : NIH stroke scale for    Discussion of management with other QHP or appropriate source(s): None      FINAL IMPRESSION  1. Cerebrovascular accident (CVA) due to occlusion of left anterior cerebral artery (HCC)        Electronically signed by: Bimal Ramon M.D., 8/1/2023 9:20 AM

## 2023-08-01 NOTE — PROGRESS NOTES
4 Eyes Skin Assessment Completed by Pari/Moni RN and CHRISTINA Ribeiro.    Head WDL  Ears WDL  Nose WDL  Mouth WDL  Neck WDL  Breast/Chest  multiple Skin tags  Shoulder Blades WDL  Spine WDL  (R) Arm/Elbow/Hand WDL  (L) Arm/Elbow/Hand WDL  Abdomen WDL  Groin WDL  Scrotum/Coccyx/Buttocks Redness and Blanching  (R) Leg WDL  (L) Leg WDL  (R) Heel/Foot/Toe WDL  (L) Heel/Foot/Toe WDL          Devices In Places Tele Box, Pulse Ox, and SCD's      Interventions In Place Q2 Turns    Possible Skin Injury No    Pictures Uploaded Into Epic N/A  Wound Consult Placed N/A  RN Wound Prevention Protocol Ordered No

## 2023-08-01 NOTE — ED NOTES
Patient incontinent of urine, provided shae care and purewick in place.   Patient speaking to daughter in law selvin.

## 2023-08-01 NOTE — H&P
Hospital Medicine History & Physical Note    Date of Service  8/1/2023    Primary Care Physician  Misa Marie M.D.    Consultants  [facility] - Neurology     Code Status  Prior    Chief Complaint  Chief Complaint   Patient presents with    Extremity Weakness     Pt woke today at 07:15 with RUE and RLE weakness, LKW last night approx 23:00, RUE symptoms have improved on arrival and only has mild drift, pt is unable to move RLE, sensation intact.        History of Presenting Illness  Doreen Noe is a 67 y.o. female with history of seizure disorder, previous stroke, chronic tremor and neuropathy. She presented to [facility] on 8/1/2023 with right sided weakness.  She states she was in her normal state of health until she noted the she couldn't move her right side upon waking this am at 7:15.  She rolled off the bed (but did not hurt herself or hit her head). She was able to call an ambulance. When they arrived, she reportedly could not move her upper or lower R side. By the time she arrived in the ER, the upper weakness had nearly resolved and over the past two hours in the ER her right lower weakness is also starting to improve.    In the ER, a CTA of head and neck was consistent with occlusion of the distal left pericallosal artery and a moderate size chronic area of infarction in the right anterior frontal lobe. She was seen by neurology and will be admitted to the neurology unit on tele monitor with permissive hypertension. An MRI is pending    I discussed the plan of care with patient, Dr. Ramon and neurology    Review of Systems  Review of Systems   Constitutional: Negative.  Negative for chills, diaphoresis, fever, malaise/fatigue and weight loss.   HENT: Negative.  Negative for sore throat.    Eyes: Negative.  Negative for blurred vision.   Respiratory: Negative.  Negative for cough and shortness of breath.    Cardiovascular: Negative.  Negative for chest pain, palpitations and leg swelling.   Gastrointestinal:  Negative.  Negative for abdominal pain, nausea and vomiting.   Genitourinary: Negative.  Negative for dysuria.   Musculoskeletal: Negative.  Negative for myalgias.   Skin: Negative.  Negative for itching and rash.   Neurological: Negative.  Negative for dizziness, focal weakness, weakness and headaches.   Endo/Heme/Allergies: Negative.  Does not bruise/bleed easily.   Psychiatric/Behavioral: Negative.  Negative for depression, substance abuse and suicidal ideas.    All other systems reviewed and are negative.      Past Medical History   has a past medical history of Hyperlipidemia, Muscle disorder, Neuropathy (HCC), Seizure disorder (HCC), Stroke (HCC), Substance abuse (HCC), and Thrombocytopenia (HCC) (8/1/2023).    Surgical History   has a past surgical history that includes pr neuroplasty & or transpos median nrv carpal gerardo (Left, 1/21/2020) and pr colonoscopy,diagnostic (N/A, 9/28/2021).     Family History  family history includes Dementia in her mother; Heart Attack in her father; Heart Failure in her father and mother; Hypertension in her father and mother; Kidney Disease in her father.   Family history reviewed with patient. There is no family history that is pertinent to the chief complaint.     Social History   reports that she has never smoked. She has never used smokeless tobacco. She reports current alcohol use. She reports current drug use. Drug: Inhaled.    Allergies  No Known Allergies    Medications  Prior to Admission Medications   Prescriptions Last Dose Informant Patient Reported? Taking?   Ascorbic Acid (VITAMIN C PO) 7/31/2023 at [facility] Patient Yes Yes   Sig: Take 1 Tablet by mouth every day.   B Complex Vitamins (VITAMIN B COMPLEX PO) 7/31/2023 at [facility] Patient Yes Yes   Sig: Take 1 Tablet by mouth every day.   Omega-3 Fatty Acids (FISH OIL) 1000 MG Cap capsule 7/31/2023 at [facility] Patient Yes Yes   Sig: Take 1,000 mg by mouth every day.   VITAMIN A PO 7/31/2023 at [facility] Patient Yes Yes   Sig: Take 1  Tablet by mouth every day.   VITAMIN D PO 7/31/2023 at [facility] Patient Yes Yes   Sig: Take 1 Tablet by mouth every day.   lamotrigine (LAMICTAL) 150 MG tablet 7/31/2023 at [facility] Patient Yes No   Sig: Take 150 mg by mouth every day.   multivitamin Tab 7/31/2023 at [facility] Patient Yes Yes   Sig: Take 1 Tablet by mouth every day.      Facility-Administered Medications: None       Physical Exam  Temp:  [36.6 °C (97.8 °F)] 36.6 °C (97.8 °F)  Pulse:  [54-75] 54  Resp:  [16] 16  BP: (150-183)/(72-88) 150/72  SpO2:  [95 %-96 %] 96 %  Blood Pressure : (!) 183/88   Temperature: 36.6 °C (97.8 °F)   Pulse: 75   Respiration: 16   Pulse Oximetry: 95 %       Physical Exam  Vitals and nursing note reviewed. Exam conducted with a chaperone present.   Constitutional:       General: She is not in acute distress.     Appearance: Normal appearance. She is not diaphoretic.   HENT:      Head: Normocephalic.      Nose: Nose normal.      Mouth/Throat:      Mouth: Mucous membranes are moist.   Eyes:      Pupils: Pupils are equal, round, and reactive to light.   Cardiovascular:      Rate and Rhythm: Normal rate and regular rhythm.      Pulses: Normal pulses.      Heart sounds: Normal heart sounds. No murmur heard.     No gallop.   Pulmonary:      Effort: Pulmonary effort is normal. No respiratory distress.      Breath sounds: Normal breath sounds. No wheezing.   Abdominal:      General: Abdomen is flat. Bowel sounds are normal. There is no distension.      Palpations: Abdomen is soft.      Tenderness: There is no abdominal tenderness.   Musculoskeletal:         General: No swelling or deformity. Normal range of motion.   Skin:     General: Skin is warm and dry.      Capillary Refill: Capillary refill takes less than 2 seconds.   Neurological:      General: No focal deficit present.      Mental Status: She is alert and oriented to person, place, and time.      Cranial Nerves: No cranial nerve deficit.      Motor: Weakness (R LE 3/5, R UE 4.5/5) present.       Comments: Sensation intact, no pronator drift, no facial droop   Psychiatric:         Mood and Affect: Mood normal.         Behavior: Behavior normal.         Laboratory:  Recent Labs     08/01/23  0855   WBC 8.8   RBC 4.92   HEMOGLOBIN 14.8   HEMATOCRIT 44.7   MCV 90.9   MCH 30.1   MCHC 33.1   RDW 41.9   PLATELETCT 156*   MPV 10.1     Recent Labs     08/01/23  0855   SODIUM 145   POTASSIUM 3.6   CHLORIDE 111   CO2 24   GLUCOSE 143*   BUN 15   CREATININE 0.64   CALCIUM 8.2*     Recent Labs     08/01/23  0855   ALTSGPT 18   ASTSGOT 16   ALKPHOSPHAT 77   TBILIRUBIN 0.4   GLUCOSE 143*     Recent Labs     08/01/23  0855   APTT 23.4*   INR 1.04     No results for input(s): NTPROBNP in the last 72 hours.      Recent Labs     08/01/23  0855   TROPONINT 12       Imaging:  DX-CHEST-PORTABLE (1 VIEW)   Final Result         Linear left basilar atelectasis      CT-CTA NECK WITH & W/O-POST PROCESSING   Final Result      CT angiogram of the neck within normal limits.      CT-CTA HEAD WITH & W/O-POST PROCESS   Final Result      1.  Occlusion of distal left pericallosal artery.      2.  Moderate size chronic area of infarction in right anterior frontal lobe.      3.  Case discussed with Dr. Ahmadi at 9:35 AM 8/1/2023.      CT-CEREBRAL PERFUSION ANALYSIS   Final Result      1.  Cerebral blood flow less than 30% likely representing completed infarct = 0 mL.      2.  T Max more than 6 seconds likely representing combination of completed infarct and ischemia = 3 mL.      3.  Mismatched volume likely representing ischemic brain/penumbra = 3      4.  Please note that the cerebral perfusion was performed on the limited brain tissue around the basal ganglia region. Infarct/ischemia outside the CT perfusion sections can be missed in this study.      CT-HEAD W/O   Final Result      1.  Cerebral atrophy.      2.  White matter lucencies most consistent with small vessel ischemic change versus demyelination or gliosis.      3. Moderate  wedge-shaped area of chronic infarction in the right anterior frontal lobe.         MR-BRAIN-W/O    (Results Pending)   EC-ECHOCARDIOGRAM COMPLETE W/O CONT    (Results Pending)       EKG:  My impression is: nsr 66, no ischemic changes qtc 454    Assessment/Plan:  Justification for Admission Status  I anticipate this patient will require at least two midnights for appropriate medical management, necessitating inpatient admission because of stroke    Patient will need a Telemetry bed on EMERGENCY service .  The need is secondary to stroke.    * Stroke determined by clinical assessment ([facility])- (present on admission)  Assessment & Plan  Acute right sided weakness  improving  Neurology following  Asa and statin for now  Tele  Mri pending  Serial neuro exams  Speech, PT and OT to eval   Permission htn for now      Hypocalcemia  Assessment & Plan  Mild  Will replace with tums  Will repeat in am    Seizure disorder ([facility])- (present on admission)  Assessment & Plan  History of  Seizure precautions  Following  Continue lamictal        VTE prophylaxis: SCDs/TEDs

## 2023-08-02 ENCOUNTER — APPOINTMENT (OUTPATIENT)
Dept: RADIOLOGY | Facility: MEDICAL CENTER | Age: 68
DRG: 065 | End: 2023-08-02
Attending: INTERNAL MEDICINE
Payer: COMMERCIAL

## 2023-08-02 LAB
ANION GAP SERPL CALC-SCNC: 13 MMOL/L (ref 7–16)
BASOPHILS # BLD AUTO: 0.6 % (ref 0–1.8)
BASOPHILS # BLD: 0.04 K/UL (ref 0–0.12)
BUN SERPL-MCNC: 12 MG/DL (ref 8–22)
CALCIUM SERPL-MCNC: 9.3 MG/DL (ref 8.5–10.5)
CHLORIDE SERPL-SCNC: 107 MMOL/L (ref 96–112)
CHOLEST SERPL-MCNC: 214 MG/DL (ref 100–199)
CO2 SERPL-SCNC: 21 MMOL/L (ref 20–33)
CREAT SERPL-MCNC: 0.62 MG/DL (ref 0.5–1.4)
EOSINOPHIL # BLD AUTO: 0.12 K/UL (ref 0–0.51)
EOSINOPHIL NFR BLD: 1.7 % (ref 0–6.9)
ERYTHROCYTE [DISTWIDTH] IN BLOOD BY AUTOMATED COUNT: 41.4 FL (ref 35.9–50)
EST. AVERAGE GLUCOSE BLD GHB EST-MCNC: 117 MG/DL
GFR SERPLBLD CREATININE-BSD FMLA CKD-EPI: 98 ML/MIN/1.73 M 2
GLUCOSE SERPL-MCNC: 100 MG/DL (ref 65–99)
HBA1C MFR BLD: 5.7 % (ref 4–5.6)
HCT VFR BLD AUTO: 46 % (ref 37–47)
HDLC SERPL-MCNC: 41 MG/DL
HGB BLD-MCNC: 15.2 G/DL (ref 12–16)
IMM GRANULOCYTES # BLD AUTO: 0.03 K/UL (ref 0–0.11)
IMM GRANULOCYTES NFR BLD AUTO: 0.4 % (ref 0–0.9)
LDLC SERPL CALC-MCNC: 146 MG/DL
LYMPHOCYTES # BLD AUTO: 1.8 K/UL (ref 1–4.8)
LYMPHOCYTES NFR BLD: 25 % (ref 22–41)
MCH RBC QN AUTO: 29.7 PG (ref 27–33)
MCHC RBC AUTO-ENTMCNC: 33 G/DL (ref 32.2–35.5)
MCV RBC AUTO: 89.8 FL (ref 81.4–97.8)
MONOCYTES # BLD AUTO: 0.52 K/UL (ref 0–0.85)
MONOCYTES NFR BLD AUTO: 7.2 % (ref 0–13.4)
NEUTROPHILS # BLD AUTO: 4.69 K/UL (ref 1.82–7.42)
NEUTROPHILS NFR BLD: 65.1 % (ref 44–72)
NRBC # BLD AUTO: 0 K/UL
NRBC BLD-RTO: 0 /100 WBC (ref 0–0.2)
PLATELET # BLD AUTO: 168 K/UL (ref 164–446)
PMV BLD AUTO: 9.9 FL (ref 9–12.9)
POTASSIUM SERPL-SCNC: 3.8 MMOL/L (ref 3.6–5.5)
RBC # BLD AUTO: 5.12 M/UL (ref 4.2–5.4)
SODIUM SERPL-SCNC: 141 MMOL/L (ref 135–145)
TRIGL SERPL-MCNC: 137 MG/DL (ref 0–149)
WBC # BLD AUTO: 7.2 K/UL (ref 4.8–10.8)

## 2023-08-02 PROCEDURE — 85025 COMPLETE CBC W/AUTO DIFF WBC: CPT

## 2023-08-02 PROCEDURE — 80061 LIPID PANEL: CPT

## 2023-08-02 PROCEDURE — A9270 NON-COVERED ITEM OR SERVICE: HCPCS | Performed by: INTERNAL MEDICINE

## 2023-08-02 PROCEDURE — 97535 SELF CARE MNGMENT TRAINING: CPT

## 2023-08-02 PROCEDURE — 99233 SBSQ HOSP IP/OBS HIGH 50: CPT | Performed by: INTERNAL MEDICINE

## 2023-08-02 PROCEDURE — 700102 HCHG RX REV CODE 250 W/ 637 OVERRIDE(OP): Performed by: INTERNAL MEDICINE

## 2023-08-02 PROCEDURE — 80048 BASIC METABOLIC PNL TOTAL CA: CPT

## 2023-08-02 PROCEDURE — 36415 COLL VENOUS BLD VENIPUNCTURE: CPT

## 2023-08-02 PROCEDURE — 97166 OT EVAL MOD COMPLEX 45 MIN: CPT

## 2023-08-02 PROCEDURE — 83036 HEMOGLOBIN GLYCOSYLATED A1C: CPT

## 2023-08-02 PROCEDURE — 99223 1ST HOSP IP/OBS HIGH 75: CPT | Performed by: PHYSICAL MEDICINE & REHABILITATION

## 2023-08-02 PROCEDURE — 92610 EVALUATE SWALLOWING FUNCTION: CPT

## 2023-08-02 PROCEDURE — 770020 HCHG ROOM/CARE - TELE (206)

## 2023-08-02 PROCEDURE — 97163 PT EVAL HIGH COMPLEX 45 MIN: CPT

## 2023-08-02 PROCEDURE — 70450 CT HEAD/BRAIN W/O DYE: CPT

## 2023-08-02 PROCEDURE — 99232 SBSQ HOSP IP/OBS MODERATE 35: CPT | Performed by: NURSE PRACTITIONER

## 2023-08-02 PROCEDURE — 700102 HCHG RX REV CODE 250 W/ 637 OVERRIDE(OP): Performed by: NURSE PRACTITIONER

## 2023-08-02 PROCEDURE — 700102 HCHG RX REV CODE 250 W/ 637 OVERRIDE(OP): Performed by: HOSPITALIST

## 2023-08-02 PROCEDURE — A9270 NON-COVERED ITEM OR SERVICE: HCPCS | Performed by: NURSE PRACTITIONER

## 2023-08-02 PROCEDURE — A9270 NON-COVERED ITEM OR SERVICE: HCPCS | Performed by: HOSPITALIST

## 2023-08-02 RX ORDER — HYDRALAZINE HYDROCHLORIDE 20 MG/ML
10 INJECTION INTRAMUSCULAR; INTRAVENOUS EVERY 4 HOURS PRN
Status: DISCONTINUED | OUTPATIENT
Start: 2023-08-02 | End: 2023-08-04 | Stop reason: HOSPADM

## 2023-08-02 RX ORDER — LOSARTAN POTASSIUM 50 MG/1
25 TABLET ORAL
Status: DISCONTINUED | OUTPATIENT
Start: 2023-08-02 | End: 2023-08-04 | Stop reason: HOSPADM

## 2023-08-02 RX ADMIN — APIXABAN 5 MG: 5 TABLET, FILM COATED ORAL at 11:14

## 2023-08-02 RX ADMIN — APIXABAN 5 MG: 5 TABLET, FILM COATED ORAL at 16:22

## 2023-08-02 RX ADMIN — LAMOTRIGINE 150 MG: 100 TABLET ORAL at 16:26

## 2023-08-02 RX ADMIN — ASPIRIN 300 MG: 300 SUPPOSITORY RECTAL at 05:04

## 2023-08-02 RX ADMIN — LOSARTAN POTASSIUM 25 MG: 50 TABLET, FILM COATED ORAL at 16:22

## 2023-08-02 RX ADMIN — ATORVASTATIN CALCIUM 80 MG: 80 TABLET, FILM COATED ORAL at 16:25

## 2023-08-02 ASSESSMENT — LIFESTYLE VARIABLES
HAVE PEOPLE ANNOYED YOU BY CRITICIZING YOUR DRINKING: NO
ALCOHOL_USE: NO
TOTAL SCORE: 0
HOW MANY TIMES IN THE PAST YEAR HAVE YOU HAD 5 OR MORE DRINKS IN A DAY: 0
ON A TYPICAL DAY WHEN YOU DRINK ALCOHOL HOW MANY DRINKS DO YOU HAVE: 0
CONSUMPTION TOTAL: NEGATIVE
HAVE YOU EVER FELT YOU SHOULD CUT DOWN ON YOUR DRINKING: NO
TOTAL SCORE: 0
EVER FELT BAD OR GUILTY ABOUT YOUR DRINKING: NO
TOTAL SCORE: 0
AVERAGE NUMBER OF DAYS PER WEEK YOU HAVE A DRINK CONTAINING ALCOHOL: 0
EVER HAD A DRINK FIRST THING IN THE MORNING TO STEADY YOUR NERVES TO GET RID OF A HANGOVER: NO

## 2023-08-02 ASSESSMENT — COGNITIVE AND FUNCTIONAL STATUS - GENERAL
HELP NEEDED FOR BATHING: A LOT
CLIMB 3 TO 5 STEPS WITH RAILING: TOTAL
TOILETING: A LOT
WALKING IN HOSPITAL ROOM: TOTAL
DRESSING REGULAR UPPER BODY CLOTHING: A LOT
MOVING FROM LYING ON BACK TO SITTING ON SIDE OF FLAT BED: UNABLE
MOVING FROM LYING ON BACK TO SITTING ON SIDE OF FLAT BED: A LOT
SUGGESTED CMS G CODE MODIFIER DAILY ACTIVITY: CK
MOVING TO AND FROM BED TO CHAIR: A LOT
WALKING IN HOSPITAL ROOM: A LOT
TOILETING: A LOT
EATING MEALS: A LITTLE
DRESSING REGULAR LOWER BODY CLOTHING: A LOT
TURNING FROM BACK TO SIDE WHILE IN FLAT BAD: A LOT
HELP NEEDED FOR BATHING: A LOT
STANDING UP FROM CHAIR USING ARMS: A LITTLE
MOBILITY SCORE: 9
PERSONAL GROOMING: A LITTLE
DAILY ACTIVITIY SCORE: 18
SUGGESTED CMS G CODE MODIFIER MOBILITY: CM
CLIMB 3 TO 5 STEPS WITH RAILING: TOTAL
DRESSING REGULAR LOWER BODY CLOTHING: A LOT
TURNING FROM BACK TO SIDE WHILE IN FLAT BAD: UNABLE
DAILY ACTIVITIY SCORE: 14
STANDING UP FROM CHAIR USING ARMS: TOTAL
MOBILITY SCORE: 9
MOVING TO AND FROM BED TO CHAIR: UNABLE
SUGGESTED CMS G CODE MODIFIER MOBILITY: CM
SUGGESTED CMS G CODE MODIFIER DAILY ACTIVITY: CK

## 2023-08-02 ASSESSMENT — PATIENT HEALTH QUESTIONNAIRE - PHQ9
1. LITTLE INTEREST OR PLEASURE IN DOING THINGS: NOT AT ALL
2. FEELING DOWN, DEPRESSED, IRRITABLE, OR HOPELESS: NOT AT ALL
SUM OF ALL RESPONSES TO PHQ9 QUESTIONS 1 AND 2: 0

## 2023-08-02 ASSESSMENT — ACTIVITIES OF DAILY LIVING (ADL): TOILETING: INDEPENDENT

## 2023-08-02 ASSESSMENT — FIBROSIS 4 INDEX: FIB4 SCORE: 1.46

## 2023-08-02 ASSESSMENT — PAIN DESCRIPTION - PAIN TYPE
TYPE: ACUTE PAIN
TYPE: ACUTE PAIN

## 2023-08-02 NOTE — THERAPY
Occupational Therapy   Initial Evaluation     Patient Name: Doreen Noe  Age:  67 y.o., Sex:  female  Medical Record #: [id number]  Today's Date: 8/2/2023     Precautions  Precautions: Fall Risk, Swallow Precautions  Comments: right weakness and mild neglect    Assessment  Patient is 67 y.o. female with a diagnosis of new onset CVA with right weakness due to small ischemic left CAROL ANN stroke.  Additional factors influencing patient status / progress: Pt has old CVA with mild left deficits. Pt has supportive family to assist 24/7 prn. Pt is motivated for therapy while demonstrating physical and cognitive deficits limiting ability to perform ADLs independently. Pt is normally independent for all self care and her goal is to return to this level of function. Pt can benefit from acute OT to increase independence in ADLs. Pt will need post acute OT placement due to significance of deficits.   Plan    Occupational Therapy Initial Treatment Plan   Treatment Interventions: Self Care / Activities of Daily Living, Neuro Re-Education / Balance, Therapeutic Activity, Therapeutic Exercises, Adaptive Equipment  Treatment Frequency: 5 Times per Week  Duration: Until Therapy Goals Met    DC Equipment Recommendations: Unable to determine at this time  Discharge Recommendations: Recommend post-acute placement for additional occupational therapy services prior to discharge home        08/02/23 1237   Prior Living Situation   Prior Services None;Home-Independent   Housing / Facility 2 Story House   Steps Into Home 5   Steps In Home 15   Bathroom Set up Bathtub / Shower Combination;Walk In Shower   Equipment Owned None   Lives with - Patient's Self Care Capacity Alone and Able to Care For Self   Comments Can stay with S.O. who lives in a single story house. In Pt's home she can stay on ground floor with tub/shower downstairs. Walk in is upstairs. 24 hour assistance is available.   Prior Level of ADL Function   Self Feeding Independent    Grooming / Hygiene Independent   Bathing Independent   Dressing Independent   Toileting Independent   Prior Level of IADL Function   Medication Management Independent   Laundry Independent   Kitchen Mobility Independent   Finances Independent   Home Management Independent   Shopping Independent   Prior Level Of Mobility Independent Without Device in Community   Driving / Transportation Driving Independent   History of Falls   History of Falls No   Precautions   Precautions Fall Risk;Swallow Precautions   Comments right weakness and mild neglect   Pain   Pain Scales 0 to 10 Scale    Intervention Ambulation / Increased Activity   Pain 0 - 10 Group   Therapist Pain Assessment Post Activity Pain Same as Prior to Activity;Nurse Notified;4   Cognition    Cognition / Consciousness X   Speech/ Communication Delayed Responses;Word Finding Impairment;Expressive Aphasia   Level of Consciousness Alert   Ability To Follow Commands 1 Step   Safety Awareness Impaired   Attention Impaired   Sequencing Impaired   Initiation Impaired   Passive ROM Upper Body   Passive ROM Upper Body WDL   Active ROM Upper Body   Active ROM Upper Body  X   Dominant Hand Right   Comments grossly impaired right (can get hand to face and hair for ADLS with left assist, but not full ROM); left WNL   Strength Upper Body   Upper Body Strength  X   Comments R grossly 3-/5; left WDL   Sensation Upper Body   Upper Extremity Sensation  X   Comments Pt reports some decreased sensation right but is not able to specify   Upper Body Muscle Tone   Upper Body Muscle Tone  WDL   Neurological Concerns   Neurological Concerns Yes  (baseline left hand tremor or twitch due to old CVA)   Sitting Posture During ADL's Lateral Lean Right   Standing Posture During ADL's Lateral Lean Right   Rt Upper Extremity Gross Motor Control Impaired   Rt Upper Extremity Fine Motor Control Impaired   Rt Upper Extremity Functional Use Impaired   Right In Hand Manipulation Impaired    Coordination Upper Body   Coordination X   Fine Motor Coordination impaired right   Gross Motor Coordination impaired right   Balance Assessment   Sitting Balance (Static) Fair -   Sitting Balance (Dynamic) Poor +   Standing Balance (Static) Poor   Standing Balance (Dynamic) Poor   Weight Shift Sitting Poor   Weight Shift Standing Poor   Comments with bilateral HHA   Bed Mobility    Supine to Sit Moderate Assist   Scooting Maximal Assist   ADL Assessment   Grooming Seated;Minimal Assist   Upper Body Dressing Minimal Assist   Lower Body Dressing Maximal Assist   Functional Mobility   Sit to Stand Moderate Assist   Bed, Chair, Wheelchair Transfer Moderate Assist   Comments two person HHA to chair with assist for moving right leg even when unweighted   Visual Perception   Visual Perception  X   Neglect Mild Right   Comments can look right and see obj in right field when prompted   Activity Tolerance   Sitting in Chair left up in chair with family present and alarm   Sitting Edge of Bed 10 min   Standing 3 min   Patient / Family Goals   Patient / Family Goal #1 Go to rehab   Short Term Goals   Short Term Goal # 1 Xfer to chair with min a   Short Term Goal # 2 seated grooming at supervision   Occupational Therapy Initial Treatment Plan    Treatment Interventions Self Care / Activities of Daily Living;Neuro Re-Education / Balance;Therapeutic Activity;Therapeutic Exercises;Adaptive Equipment   Treatment Frequency 5 Times per Week   Duration Until Therapy Goals Met   Problem List   Problem List Decreased Active Daily Living Skills;Decreased Homemaking Skills;Decreased Upper Extremity Strength Right;Decreased Upper Extremity AROM Right;Decreased Functional Mobility;Decreased Activity Tolerance;Safety Awareness Deficits / Cognition;Impaired Cognitive Function;Impaired Coordination Right Upper Extremity;Impaired Sensation Right Upper Extremity;Impaired Postural Control / Balance   Anticipated Discharge Equipment and  Recommendations   DC Equipment Recommendations Unable to determine at this time   Discharge Recommendations Recommend post-acute placement for additional occupational therapy services prior to discharge home

## 2023-08-02 NOTE — PREADMISSION SCREENING NOTE
Updated Pre-Screen Assessment     Name: Doreen Noe  MRN: [id number]  : 1955    Medical Status/ Changes:     Dr. Llamas progress note 23:  Date of Service  8/3/2023     Chief Complaint  Doreen Noe is a 67 y.o. female admitted 2023 with right-sided weakness     Hospital Course  67-year-old male with a past medical history of seizure disorder, prior stroke, chronic tremors, neuropathy who presented with acute onset of right-sided weakness.  Stat CT head revealed no acute intracranial abnormality.  Did reveal encephalomalacia to right frontal region consistent with prior stroke.  CTA head and neck was negative for large vessel occlusion.  Patient was not a candidate for tPA since her presentation was greater than 4.5 hours.     Interval Problem Update  Patient seen and evaluated bedside with family.  She reports improvement of her right-sided weakness.  Speech therapy recommends soft bite-size solids and thins.  PT OT recommends postacute placement at acute rehab.  Physiatry consult has been placed.  2D echo pending     I have discussed this patient's plan of care and discharge plan at IDT rounds today with Case Management, Nursing, Nursing leadership, and other members of the IDT team.     Consultants/Specialty  neurology     Code Status  Prior     Disposition  The patient is not medically cleared for discharge to home or a post-acute facility.  Anticipate discharge to: an inpatient rehabilitation hospital     I have placed the appropriate orders for post-discharge needs.     Review of Systems  ROS      Physical Exam  Temp:  [36.6 °C (97.9 °F)-36.7 °C (98.1 °F)] 36.6 °C (97.9 °F)  Pulse:  [62-82] 82  Resp:  [16-18] 18  BP: (140-154)/(78-89) 140/85  SpO2:  [92 %-94 %] 92 %     Physical Exam  Vitals and nursing note reviewed.   Constitutional:       General: She is not in acute distress.     Appearance: Normal appearance.   HENT:      Head: Normocephalic and atraumatic.      Nose: Nose normal.       Mouth/Throat:      Mouth: Mucous membranes are moist.   Eyes:      Extraocular Movements: Extraocular movements intact.      Conjunctiva/sclera: Conjunctivae normal.      Pupils: Pupils are equal, round, and reactive to light.   Cardiovascular:      Rate and Rhythm: Normal rate and regular rhythm.      Pulses: Normal pulses.      Heart sounds: Normal heart sounds.   Pulmonary:      Effort: Pulmonary effort is normal. No respiratory distress.      Breath sounds: Normal breath sounds. No wheezing, rhonchi or rales.   Abdominal:      General: Bowel sounds are normal. There is no distension.      Palpations: Abdomen is soft.      Tenderness: There is no abdominal tenderness.   Musculoskeletal:         General: No swelling or tenderness. Normal range of motion.      Cervical back: Normal range of motion and neck supple.   Lymphadenopathy:      Cervical: No cervical adenopathy.   Skin:     General: Skin is warm.      Coloration: Skin is not jaundiced.      Findings: No rash.   Neurological:      General: No focal deficit present.      Mental Status: She is alert and oriented to person, place, and time.      Cranial Nerves: No cranial nerve deficit.      Motor: No weakness.   Psychiatric:         Mood and Affect: Mood normal.         Behavior: Behavior normal.            Fluids     Intake/Output Summary (Last 24 hours) at 8/3/2023 1604  Last data filed at 8/3/2023 1400  Gross per 24 hour  Intake 150 ml  Output 850 ml  Net -700 ml           Laboratory  Recent Labs    08/01/23  0855 08/02/23  0341 08/03/23  0623  WBC 8.8 7.2 6.6  RBC 4.92 5.12 5.36  HEMOGLOBIN 14.8 15.2 16.1*  HEMATOCRIT 44.7 46.0 47.8*  MCV 90.9 89.8 89.2  MCH 30.1 29.7 30.0  MCHC 33.1 33.0 33.7  RDW 41.9 41.4 41.1  PLATELETCT 156* 168 189  MPV 10.1 9.9 9.9        Recent Labs    08/01/23  0855 08/02/23  0341  SODIUM 145 141  POTASSIUM 3.6 3.8  CHLORIDE 111 107  CO2 24 21  GLUCOSE 143* 100*  BUN 15 12  CREATININE 0.64 0.62  CALCIUM 8.2* 9.3         Recent Labs    08/01/23  0855  APTT 23.4*  INR 1.04            Recent Labs    08/02/23  0341  TRIGLYCERIDE 137  HDL 41  *           Imaging  CT-HEAD W/O  Final Result     1.  No acute intracranial hemorrhage. No mass.  2.  New since recent prior loss of gray-white matter differentiation in the left frontal lobe near the vertex, consistent with a small subacute infarct. No hemorrhagic transformation.  3.  Chronic encephalomalacia in the right frontal lobe.        MR-BRAIN-W/O  Final Result        Acute infarct in the left medial frontal region in the left posterior CAROL ANN distribution.     Remote right frontal infarct with encephalomalacia.     Age-related volume loss.     DX-CHEST-PORTABLE (1 VIEW)  Final Result        Linear left basilar atelectasis     CT-CTA NECK WITH & W/O-POST PROCESSING  Final Result     CT angiogram of the neck within normal limits.     CT-CTA HEAD WITH & W/O-POST PROCESS  Final Result     1.  Occlusion of distal left pericallosal artery.     2.  Moderate size chronic area of infarction in right anterior frontal lobe.     3.  Case discussed with Dr. Ahmadi at 9:35 AM 8/1/2023.     CT-CEREBRAL PERFUSION ANALYSIS  Final Result     1.  Cerebral blood flow less than 30% likely representing completed infarct = 0 mL.     2.  T Max more than 6 seconds likely representing combination of completed infarct and ischemia = 3 mL.     3.  Mismatched volume likely representing ischemic brain/penumbra = 3     4.  Please note that the cerebral perfusion was performed on the limited brain tissue around the basal ganglia region. Infarct/ischemia outside the CT perfusion sections can be missed in this study.     CT-HEAD W/O  Final Result     1.  Cerebral atrophy.     2.  White matter lucencies most consistent with small vessel ischemic change versus demyelination or gliosis.     3. Moderate wedge-shaped area of chronic infarction in the right anterior frontal lobe.        EC-ECHOCARDIOGRAM COMPLETE W/O  CONT    (Results Pending)           Assessment/Plan  * Stroke determined by clinical assessment ([facility])- (present on admission)  Assessment & Plan  Acute right sided weakness  improving  Neurology following  Started on Eliquis  Started on Lipitor 80  Continuous cardiac monitoring and 2D echo  Mri brain reveals acute infarct in left medial frontal region in the left posterior CAROL ANN distribution  Serial neuro exams  Speech, PT and OT to eval            Hypocalcemia  Assessment & Plan  Mild  Will replace with tums  Will repeat in am     Seizure disorder ([facility])- (present on admission)  Assessment & Plan  History of  Seizure precautions  Following  Continue lamictal           VTE prophylaxis:    therapeutic anticoagulation with eliquis 5 mg BID           Functional Status/ Changes:     Assessment     Pt willing to participate w/PT, family BS. Functionally the pt required mod assist w/sup->sit transition for Lft LE management and initiation of trunk. Once seated, the pt could hold static sit w/SBA, cued to drop Rt hand onto the bed. STS and transfers from bed->w/c w/mod assist. Initiated amb in // bars, mod assist for 10' x 2 w/facilitation of Rt LE during swing, limited hip flex and no ankle. The pt would benefit from the 3 hrs of a IPR program.  PT will cont to follow.         Plan     Treatment Plan Status: (P) Continue Current Treatment Plan  Type of Treatment: Equipment, Bed Mobility, Gait Training, Manual Therapy, Neuro Re-Education / Balance, Self Care / Home Evaluation, Stair Training, Therapeutic Activities, Therapeutic Exercise  Treatment Frequency: 5 Times per Week  Treatment Duration: Until Therapy Goals Met     DC Equipment Recommendations: (P) Unable to determine at this time  Discharge Recommendations: (P) Recommend post-acute placement for additional physical therapy services prior to discharge home     Objective          08/03/23 1649  Charge Group  PT Gait Training (Units) 1  PT Therapeutic Activities  (Units) 1  Total Time Spent  PT Gait Training Time Spent (Mins) 10  PT Therapeutic Activities Time Spent (Mins) 25  Precautions  Precautions Fall Risk  Comments Rt side weak, LE > UE  Balance  Sitting Balance (Static) Fair -  Sitting Balance (Dynamic) Poor +  Standing Balance (Static) Fair -  Standing Balance (Dynamic) Poor +  Weight Shift Sitting Fair  Weight Shift Standing Poor  Comments standing in // bars  Bed Mobility   Supine to Sit Moderate Assist  (HOB flat from Lft side)  Sit to Supine    (pt left seated in w/c)  Scooting Moderate Assist  (seated scoot)  Rolling Standby Assist  (w/rail asssit)  Skilled Intervention Verbal Cuing;Postural Facilitation;Compensatory Strategies  Comments Sup->sit mod assist requiring Rt LE management and initiation of trunk.  Gait Analysis  Gait Level Of Assist Moderate Assist  Assistive Device Parallel Bars  Distance (Feet) 10  # of Times Distance was Traveled 1  Skilled Intervention Postural Facilitation;Compensatory Strategies;Verbal Cuing  Comments Initiated amb in // bars, the pt required Rt LE facilitation for initiation of swing. Static  // bars working on holding hips @ midline w/mirror for feedback.  Functional Mobility  Sit to Stand Moderate Assist  (from EOB)  Bed, Chair, Wheelchair Transfer Moderate Assist  (bed->w/c)  Transfer Method Stand Step  Comments The pt conts to require mod assist.  How much difficulty does the patient currently have...  Turning over in bed (including adjusting bedclothes, sheets and blankets)? 1  Sitting down on and standing up from a chair with arms (e.g., wheelchair, bedside commode, etc.) 1  Moving from lying on back to sitting on the side of the bed? 1  How much help from another person does the patient currently need...  Moving to and from a bed to a chair (including a wheelchair)? 2  Need to walk in a hospital room? 2  Climbing 3-5 steps with a railing? 1  6 clicks Mobility Score 8  Short Term Goals   Short Term Goal # 1 Pt  will perform supine<>sit from flat HOB/no railing with supervision within 6 visits to ensure independent mobility at home.  Goal Outcome # 1 goal not met  Short Term Goal # 2 Pt will perform sit<>stand with FWW and supervision within 6 visits to ensure independent mobility at home.  Goal Outcome # 2 Goal not met  Short Term Goal # 3 Pt will ambulate x 150ft with FWW and supervision within 6 visits to ensure independent mobility at home.  Goal Outcome # 3 Goal not met  Short Term Goal # 4 Pt will ascend/descend 1 step with B UE support and min a within 6 visits to ensure entry/exit of home.  Goal Outcome # 4 Goal not met  Education Group  Role of Physical Therapist Patient Response Patient;Acceptance;Explanation;Action Demonstration  Physical Therapy Treatment Plan  Physical Therapy Treatment Plan Continue Current Treatment Plan  Anticipated Discharge Equipment and Recommendations  DC Equipment Recommendations Unable to determine at this time  Discharge Recommendations Recommend post-acute placement for additional physical therapy services prior to discharge home  Interdisciplinary Plan of Care Collaboration  IDT Collaboration with  Nursing  Patient Position at End of Therapy Seated  (in w/c w/family push)  Collaboration Comments Nrsg notified of pts tx efforts  Session Information  Date / Session Number  8/3--2 (2/5, 8/8) PTA/1  Supervising Physical Therapist (PTA Treatments Only)  Supervising Physical Therapist Kelly Ag     History of Present Illness  67 y.o. female presented to [facility] 8/1/23 with right sided weakness.      PMHx: seizure disorder, previous stroke (2016), chronic tremor, and neuropathy     CMHx: stroke, seizure disorder, thrombocytopenia, hypocalcemia.      CT-CTA Head: 1. Occlusion of distal left pericallosal artery. 2.  Moderate size chronic area of infarction in right anterior frontal lobe.     CXR: Linear left basilar opacities  No pleural effusion. No pneumothorax.        General  Information  O2 Delivery Device: None - Room Air  Level of Consciousness: Alert, Awake  Patient Behaviors:  (Cooperative, pleasant)  Orientation: Oriented x 4  Follows Directives: Yes        Prior Living Situation & Level of Function  Prior Services: None, Home-Independent  Lives with - Patient's Self Care Capacity: Alone and Able to Care For Self  Communication: WNL  Swallowing: WNL        Subjective  Pt alert, awake, agreeable to participate. Pt report no changes in cognition since recent hospital admission.         Communication Domain(s)  Expressive Language: WFL  Receptive Language: WFL  Cognitive-Linguistic: WFL  Social/Pragmatic: WFL        Assessment  The patient was seen this date for a cognitive-linguistic evaluation. The standardized assessment, Cognistat (The Neurobehavioral Cognitive status Examination) was conducted and the Pt's results are as follows:     Orientation: Average  Attention: Average  Comprehension: Average  Repetition: Average  Naming: Average  Memory: Average  Calculations: Moderate  Similarities: Mild  Judgement: Average     Non-standardized measures were also conducted to assess cognitive domains. Auditory comprehension: Pt answered simple yes/no questions: 2/2, moderate yes/no questions: 3/3, complex yes/no questions 3/3. Pt followed simple commands 2/2, moderate commands: 3/3, and complex commands: 3/3. The medical management tool was administered and the Pt accurately demo' d dosage management and strategies to ensure prevention of missed dose. A language sample was conducted via picture sample: Pt demo' d functional descriptions, grammar, and complex sentence structure. Per Pt report, Pt independent with IADLS at home.         Clinical Impressions  Patient presents with functional cognitive-linguistic skills for discharge environment. Pt demo' d moderate deficits re: calculations, however, per Pt report, consistent with baseline and Pt utilizes calculator at home. Pt denied any  "changes or concerns re: cognition. Pt expressed she will be discharged to spouse's home who will be able to assist with any IADLS as needed. No acute speech therapy needs indicated at this time. Please re-consult SLP if any changes arise.         NOTE: It is not within the scope of practice of Speech-Language Pathologists to determine patient capacity. Please defer to the physician or psych to complete this assessment.         Recommendations  Supervision Needs Upons Discharge: None  IADLs: N/A        Anticipated Discharge Needs  Discharge Recommendations: Anticipate that the patient will have no further speech therapy needs after discharge from the hospital  Therapy Recommendations Upon DC: Patient / Family / Caregiver Education        Patient / Family Goals  Patient / Family Goal #1: \"I haven't eaten anything since Monday night\"  Short Term Goal # 1: Pt will consume SB6/TN0 diet with no overt s/sx aspiration  Goal Outcome # 1: Goal met, new goal added  Short Term Goal # 1 B : Pt will consume RG7/TN0 diet with no overt s/sx of aspiration        Milena Weeks, SLP    Reviewer: Ortega Deras L.P.N.  Date: 2023  Time: 2:30 PM  Pre-Admission Screening Form    Patient Information:   Name: Doreen Noe     MRN: [id number]       : 1955      Age: 67 y.o.   Gender: female      Race: White [7]       Marital Status:  [4]  Family Contact: Rosanne Hernandez Craig        Relationship: Daughter-in-law [28]  Son [15]  Home Phone: [phone]           Cell Phone: [phone]  Advanced Directives: None  Code Status:  Prior  Current Attending Provider: Loyd Llamas M.D.  Referring Physician: Dr. Mary Bello      Physiatrist Consult: Dr. Adwoa Dean       Referral Date: 2023  Primary Payor Source:  [facility]  Secondary Payor Source:      Medical Information:   Date of Admission to Acute Care [place]2023  Room Number: S196/02  Rehabilitation " Diagnosis: 0001.2 - Stroke: Right Body Involvement (Left Brain)  Immunization History   Administered Date(s) Administered    Pneumococcal Conjugate Vaccine (Prevnar/PCV-13) 05/28/2018    TD Vaccine 03/26/2003    Tdap Vaccine 04/21/2015    Tetanus Vaccine - HISTORICAL DATA 03/23/2006     No Known Allergies  Past Medical History:   Diagnosis Date    Hyperlipidemia     Muscle disorder     Neuropathy (HCC)     from right shoulder injury    Seizure disorder (HCC)     10/25/2019     Stroke (HCC)     01/28/2019     Substance abuse (HCC)     Thrombocytopenia (HCC) 8/1/2023     Past Surgical History:   Procedure Laterality Date    SC COLONOSCOPY,DIAGNOSTIC N/A 9/28/2021    Procedure: COLONOSCOPY;  Surgeon: Nir Hemphill M.D.;  Location: SURGERY [facility];  Service: Gastroenterology    SC NEUROPLASTY & OR TRANSPOS MEDIAN NRV CARPAL KEVIN Left 1/21/2020    Procedure: Left carpal tunnel release.;  Surgeon: Nir Marin M.D.;  Location: SURGERY [facility];  Service: Orthopedics       History Leading to Admission, Conditions that Caused the Need for Rehab (CMS):      DEBRA Martinez  Nurse Practitioner  Neurology  Consults      Attested  Date of Service:  8/1/2023  8:58 AM    Attestation signed by Ashutosh Laura M.D. at 8/1/2023 11:44 AM     The patient was seen and examined. I agree with plan as detailed; see any corrections below:     I reviewed Elma Mcgill note and agree with their findings and plan as documented in the note except as noted below. The chart was reviewed and summarized.  As listed in reviewed note any/all available labs, imaging, vitals were reviewed and neuroimaging visualized. Available nursing, consultant, and resident notes were reviewed.     Addendum: Presenting with predominately R leg weakness with LKW last night. Stroke protocol CT with small 3ml L CAROL ANN perfusion defect, there is associated early infarct changes on on NCHCT so she is not a candidate for acute thrombolytic therapy.  No LVO  amenable for endovascular therapy.  Fairly minimal atherosclerotic disease- and now with recurrent embolic strokes in multiple vascular territories, high suspicion for cardioembolic etiology.  Will complete embolic workup and discuss empiric anticoagulation with patient.     Ashutosh Laura MD  Neurohospitalist, [facility]    Chief Complaint  Patient presents with   Extremity Weakness      Pt woke today at 07:15 with RUE and RLE weakness, LKW last night approx 23:00, RUE symptoms have improved on arrival and only has mild drift, pt is unable to move RLE, sensation intact.       Problem List Items Addressed This Visit   None    Visit Diagnoses       Cerebrovascular accident (CVA) due to occlusion of left anterior cerebral artery (HCC)         Neurology Stroke Alert Consultation     History of present illness:  This is a 67-year old female with PMHx significant for seizure disorder (reportedly nocturnal seizures) secondary to Right frontal stroke (2016; on Lamictal), dyslipidemia, further details limited who presented to [facility] on 8/1/23 for a chief complaint of Right LE > Right UE weakness. The patient states that she went to sleep in her usual state of health last night at 200; when she awoke at 0715, she had difficulty moving her RUE/RLE. She called EMS; on scene BG WNL, SBP 120s. She was reportedly profoundly weak to both RUE/RLE; en route symptoms to RUE improved; RLE remains nearly flaccid at time of arrival. Here, Stat CT head revealed no acute intracranial abnormality; did note encephalomalacia to Right frontal region consistent with chronic stroke. CTA head/neck with no obvious LVO nor hemodynamically significant stenosis; CT perfusion study with small perfusion mismatch well correlating to RLE weakness/Left CAROL ANN territory; unfortunately patient not a candidate for IV thrombolytics secondary to presentation time greater than 4.5 hours from time LKW; no IR intervention given no LVO.  Currently patient is sitting up in bed; awake and alert; still with RLE>RUE weakness. She denies numbness, problem with vision, speech or swallowing, headache or dizziness.      Neurology has been consulted by Dr. Bimal Ramon to further evaluate the findings noted above.      ASSESSMENT AND PLAN:  67-year old female with PMHx significant for seizure disorder (reportedly nocturnal seizures) secondary to Right frontal stroke (2016; on Lamictal), dyslipidemia, further details limited who presented to [facility] on 8/1/23 for a chief complaint of Right LE > Right UE weakness; LKW 2300/bed time last night. Here, CT head revealed no acute intracranial abnormality; did note encephalomalacia to Right frontal region consistent with chronic stroke. CTA head/neck with no obvious LVO nor hemodynamically significant stenosis; CT perfusion study with small perfusion mismatch well correlating to RLE weakness/Left CAROL ANN territory; unfortunately patient not a candidate for IV thrombolytics secondary to presentation time greater than 4.5 hours from time LKW; no IR intervention given no LVO. Plan for admission for MRI Brain, stroke work up; will likely/tentatively plan to start empiric anticoagulation in coming 1-2 days given presumed second cortical/embolic appearing stroke (note, patient admits to non compliance with ASA/Statin at home).      Recommendations/Plan:     -q4h and PRN neuro assessment. VS per nursing/unit protocol. BP goal is normotension, 100-130/60-80. Antihypertensives per primary team.   -Obtain MRI Brain wo contrast.   -Telemetry; currently SR. Screen for Afib/arrhythmia. Obtain TTE.  - mg PO q once now. Likely/tentative plan to start Eliquis 5 mg PO BID tomorrow or 8/3.    -Atorvastatin 80 mg PO q HS. Check lipid panel.   -Recommend aggressive BG management per primary team. Avoid IVF with Dextrose. BG goal 140-180. Check hemoglobin A1c.   -PT/OT/SLP eval and treat. Physiatry consult.   -Counseled  patient at length regarding life style and risk factor modification for secondary stroke prevention, including importance of medication compliance.   -Continue home dose Lamictal for seizure ppx.   -All other medical management per primary team.   -DVT PPX: SCDs.      The plan of care above has been discussed with Dr. Russ Laura. Please call with questions.      DEBRA Martinez  [place] [facility]     Cosigned by: Ashutosh Laura M.D. at 8/1/2023 11:44 AM        Mary Bello M.D.  [facility]  H&P      Addendum  Date of Service:  8/1/2023 10:16 AM    Addendum      [facility] Medicine History & Physical Note     Date of Service  8/1/2023     Primary Care Physician  Misa Marie M.D.     Consultants  [place] - Neurology      Code Status  Prior     Chief Complaint    Chief Complaint  Patient presents with   Extremity Weakness      Pt woke today at 07:15 with RUE and RLE weakness, LKW last night approx 23:00, RUE symptoms have improved on arrival and only has mild drift, pt is unable to move RLE, sensation intact.      History of Presenting Illness  Doreen Noe is a 67 y.o. female with history of seizure disorder, previous stroke, chronic tremor and neuropathy. She presented to [facility] on 8/1/2023 with right sided weakness.  She states she was in her normal state of health until she noted the she couldn't move her right side upon waking this am at 7:15.  She rolled off the bed (but did not hurt herself or hit her head). She was able to call an ambulance. When they arrived, she reportedly could not move her upper or lower R side. By the time she arrived in the ER, the upper weakness had nearly resolved and over the past two hours in the ER her right lower weakness is also starting to improve.     In the ER, a CTA of head and neck was consistent with occlusion of the distal left pericallosal artery and a moderate size chronic area of infarction in the right anterior frontal lobe. She was  seen by neurology and will be admitted to the neurology unit on tele monitor with permissive hypertension. An MRI is pending     I discussed the plan of care with patient, Dr. aRmon and neurology     Review of Systems  Review of Systems   Constitutional: Negative.  Negative for chills, diaphoresis, fever, malaise/fatigue and weight loss.   HENT: Negative.  Negative for sore throat.    Eyes: Negative.  Negative for blurred vision.   Respiratory: Negative.  Negative for cough and shortness of breath.    Cardiovascular: Negative.  Negative for chest pain, palpitations and leg swelling.   Gastrointestinal: Negative.  Negative for abdominal pain, nausea and vomiting.   Genitourinary: Negative.  Negative for dysuria.   Musculoskeletal: Negative.  Negative for myalgias.   Skin: Negative.  Negative for itching and rash.   Neurological: Negative.  Negative for dizziness, focal weakness, weakness and headaches.   Endo/Heme/Allergies: Negative.  Does not bruise/bleed easily.   Psychiatric/Behavioral: Negative.  Negative for depression, substance abuse and suicidal ideas.    All other systems reviewed and are negative.     Past Medical History   has a past medical history of Hyperlipidemia, Muscle disorder, Neuropathy (HCC), Seizure disorder (HCC), Stroke (HCC), Substance abuse (HCC), and Thrombocytopenia (HCC) (8/1/2023).     Surgical History   has a past surgical history that includes pr neuroplasty & or transpos median nrv carpal gerardo (Left, 1/21/2020) and pr colonoscopy,diagnostic (N/A, 9/28/2021).      Family History  family history includes Dementia in her mother; Heart Attack in her father; Heart Failure in her father and mother; Hypertension in her father and mother; Kidney Disease in her father.   Family history reviewed with patient. There is no family history that is pertinent to the chief complaint.      Social History   reports that she has never smoked. She has never used smokeless tobacco. She reports current  alcohol use. She reports current drug use. Drug: Inhaled.     Allergies  No Known Allergies     Assessment/Plan:  Justification for Admission Status  I anticipate this patient will require at least two midnights for appropriate medical management, necessitating inpatient admission because of stroke     Patient will need a Telemetry bed on EMERGENCY service .  The need is secondary to stroke.     * Stroke determined by clinical assessment ([facility])- (present on admission)  Assessment & Plan  Acute right sided weakness  improving  Neurology following  Asa and statin for now  Tele  Mri pending  Serial neuro exams  Speech, PT and OT to eval   Permission htn for now     Hypocalcemia  Assessment & Plan  Mild  Will replace with tums  Will repeat in am     Seizure disorder ([facility])- (present on admission)  Assessment & Plan  History of  Seizure precautions  Following  Continue lamictal     VTE prophylaxis: SCDs/TEDs        Adwoa Dean D.O.  Physician  Physical Medicine & Rehab  Consults      Signed  Date of Service:  8/2/2023  1:59 PM  Consult Orders  IP Consult For Physiatry [[id number]] ordered by Mary Bello M.D. at 08/02/23 1348       Expand All Collapse All                                                      Physical Medicine and Rehabilitation Consultation                                                                                  Date of initial consultation: 8/2/2023  Requesting provider: ordered by Mary Bello M.D. at 08/02/23 1348  Consulting provider: Adwoa Dean D.O.  Reason for consultation: assess for acute inpatient rehab appropriateness  LOS: 1 Day(s)     Chief complaint: new onset R sided weakness      HPI: The patient is a 67 y.o.  female with a past medical history of seizure disorder, prior stroke, chronic tremors, and neuropathy;  who presented on 8/1/2023  8:51 AM with new onset Right sided weakness. Per documentation, patient awoke with right sided weakness and rolled of her bed. She did not  hit head or LOC. 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 Neuro consulted, patient outside the window for TNKase and was not a candidate for IR intervention given no LVO. MRI brain was obtained that showed small acute ischemic stroke in the L CAROL ANN territory. Echo is pending. Neuro recommending starting eliquis and statin for secondary stroke ppx. BP goals is 100-130/60-80. Patient has been able to participate with therapies. She is Mod A for sit to stand.      Patient seen and examined at bedside with family. Patient reports that she feels ok. Denies  HA, lightheadedness, SOB, CP, abdominal pain, or changes in numbness/tingling/weakness.   + Right sided weakness  LLE > LUE.      Social Hx:  Patient lives alone, but can stay with SO who lives in a single story house. Patient lives in a 2 story house with 5 DENZEL.   5 DENZEL  At prior level of function patient was Independent with mobility and ADLs.      Tobacco: Denies   Alcohol: Occasional   Drugs: +inhaled drug use      THERAPY:  Restrictions: Fall Risk, Swallow Precautions   PT: Functional mobility   Pending      OT: ADLs  8/2  Mod A sit to stand, Mod A transfers, Max A lower body dressing      SLP:   8/2 soft and bite sized solids with thins      ASSESSMENT:  Patient is a 67 y.o. female admitted with right sided weakness      [facility] Code / Diagnosis to Support: 0001.1 - Stroke: Left Body Involvement (Right Brain)     Rehabilitation: Impaired ADLs and mobility  Patient is a good candidate for inpatient rehab based on needs for PT, OT, and speech therapy, see dispo details below.      Barriers to transfer include: Insurance authorization, TCCs to verify disposition, medical clearance and bed availability      Additional Recommendations:  L CAROL ANN stroke   Right sided weakness  - originally presented with R sided weakness , RLE weakness > RUE  weakness, impaired motor control or RUE   - CT head showed moderate area of chronic infarction in the R anterior lobe   - CTA head showed occlusion of the left pericallosal artery   - Neuro consulted, neuro IR reviewed case   - outside window for TNKase and not a candidate for IR intervention given no LVO   - MRI brain showed acute infarct in the left medial frontal region and in the left posterior CAROL ANN distribution   - secondary stroke ppx with eliquis and statin    - ECHO pending   - continue with PT/OT/SLP      Seizure disorder   - continue home dose Lamictal      HTN  - SBP up to 196 , improved to 147   - IV labetaolol, not currently on oral antihypertensives      Dispo:  - patient is currently functioning below their level of baseline, recommend post acute rehab  - recommend IRF level therapy with 3hr of therapy 5 days per week  - piror to acceptance to IRF, will need insurance auth from [facility] and confirmation of DC support from significant other (Michael) and son + daughter in law    - [facility] to assist with insurance auth and DC support      Medical Complexity:  L CAROL ANN stroke   Right sided weakness  Seizure disorder   Impaired mobility and ADLs      DVT PPX: eliquis      Thank you for allowing us to participate in the care of this patient.      Patient was seen for 82 minutes on unit/floor of which > 50% of time was spent on counseling and coordination of care regarding the above, including prognosis, risk reduction, benefits of treatment, and options for next stage of care.     Adwoa Dean D.O.   Physical Medicine and Rehabilitation         Co-morbidities:  See above  Potential Risk - Complications: Aphasia, Cognitive Impairment, Contractures, Deep Vein Thrombosis, Dysphagia, Incontinence, Malnutrition, Pain, Paralysis, Perceptual Impairment, Pneumonia, Pressure Ulcer, Seizures, Urinary Tract Infection, and Infection.  Level of Risk: High    Ongoing Medical Management Needed (Medical/Nursing Needs):   Patient  "Active Problem List    Diagnosis Date Noted    Stroke determined by clinical assessment ([facility]) 08/01/2023    Thrombocytopenia ([facility]) 08/01/2023    Hypocalcemia 08/01/2023    Acute ischemic stroke - right frontal lobe 06/05/2016    Aphasia due to stroke 06/05/2016    Right arm weakness 06/05/2016    Seizure disorder ([facility]) 08/23/2012    Borderline hyperlipidemia 08/23/2012     Lupe Gaffney R.N.  Registered Nurse  Progress Notes      Signed  Date of Service:  8/2/2023  6:16 AM    Monitor Summary: SR/JR 60-81, MN 0.15, QRS 0.08, QT 0.41, with occasional/rare PVCs, couplets/bigeminy/trigeminy per strip from monitor room.     Current Vital Signs:   Temperature: 36.6 °C (97.9 °F) Pulse: (!) 59 Respiration: 16 Blood Pressure : (!) 147/73  Weight:  (unable to get weight cause the bed is broken) Height: 170.2 cm (5' 7\")  Pulse Oximetry: 96 % O2 (LPM): 0      Completed Laboratory Reports:  Recent Labs     08/01/23  0855 08/02/23Infection  0341   WBC 8.8 7.2   HEMOGLOBIN 14.8 15.2   HEMATOCRIT 44.7 46.0   PLATELETCT 156* 168   SODIUM 145 141   POTASSIUM 3.6 3.8   BUN 15 12   CREATININE 0.64 0.62   ALBUMIN 3.6  --    GLUCOSE 143* 100*   INR 1.04  --      Additional Labs: Not Applicable    Prior Living Situation:   Housing / Facility: 2 Story House  Steps Into Home: 5  Steps In Home: 15  Lives with - Patient's Self Care Capacity: Alone and Able to Care For Self  Equipment Owned: None    Prior Level of Function / Living Situation:   Physical Therapy: Prior Services: None, Home-Independent  Housing / Facility: 2 Story House  Steps Into Home: 5  Steps In Home: 15  Bathroom Set up: Bathtub / Shower Combination, Walk In Shower  Equipment Owned: None  Lives with - Patient's Self Care Capacity: Alone and Able to Care For Self  Bed Mobility: Independent  Transfer Status: Independent  Ambulation: Independent  Assistive Devices Used: None  Stairs: Independent  Current Level of Function:      Supine to Sit: Moderate Assist  Sit to " Supine:  (Nt, in chair post)  Scooting: Maximal Assist  Sit to Stand: Moderate Assist  Bed, Chair, Wheelchair Transfer: Moderate Assist  Transfer Method: Stand Step  Sitting in Chair: left up in chair with family present and alarm  Sitting Edge of Bed: 10 min  Standing: 3 min  Occupational Therapy:   Self Feeding: Independent  Grooming / Hygiene: Independent  Bathing: Independent  Dressing: Independent  Toileting: Independent  Medication Management: Independent  Laundry: Independent  Kitchen Mobility: Independent  Finances: Independent  Home Management: Independent  Shopping: Independent  Prior Level Of Mobility: Independent Without Device in Community  Driving / Transportation: Driving Independent  Prior Services: None, Home-Independent  Housing / Facility: [address] [facility]  Current Level of Function:   Upper Body Dressing: Minimal Assist  Lower Body Dressing: Maximal Assist  Speech Language Pathology:      Rehabilitation Prognosis/Potential: Good  Estimated Length of Stay: 14-21 days    Nursing:      Incontinent    Scope/Intensity of Services Recommended:  Physical Therapy: 1 hr / day  5 days / week. Therapeutic Interventions Required: Maximize Endurance, Mobility, Strength, and Safety  Occupational Therapy: 1 hr / day 5 days / week. Therapeutic Interventions Required: Maximize Self Care, ADLs, IADLs, and Energy Conservation  Speech & Language Pathology: 1 hr / day 5 days / week. Therapeutic Interventions Required: Maximize Cognition, Swallowing, and Safety  Rehabilitation Nursin/7. Therapeutic Interventions Required: Monitor Pain, Skin, Vital Signs, Intake and Output, Labs, Safety, Aspiration Risk, Family Training, and DVT Prophylaxis; Bowel and bladder regimen; Infection control; ADL's.  Rehabilitation Physician: 3 - 5 days / week. Therapeutic Interventions Required: Medical Management  Respiratory Care: Consult. Therapeutic Interventions Required: Pulmonary Toileting, Aspiration Risk, and Respiratory care per  protocol.  Dietician: Consult. Therapeutic Interventions Required: Nutritional evaluation with recommendations to promote optimal health and healing.     She requires 24-hour rehabilitation nursing to manage bowel and bladder function, skin care, nutrition and fluid intake, pulmonary hygiene, pain control, safety, medication management, and patient/family goals. In addition, rehabilitation nursing will reiterate and reinforce therapy skills and equipment use, including ADLs, as well as provide education to the patient and family. Doreen Noe is willing to participate in and is able to tolerate the proposed plan of care.    Rehabilitation Goals and Plan (Expected frequency & duration of treatment in the IRF):   Return to the Community, Modified Independent Level of Care, and Outpatient Support  Anticipated Date of Rehabilitation Admission: 08/03/2023  Patient/Family oriented IRF level of care/facility/plan:   Patient/Family willing to participate in IRF care/facility/plan:   Patient able to tolerate IRF level of care proposed:   Patient has potential to benefit IRF level of care proposed:   Comments: Not Applicable    Special Needs or Precautions - Medical Necessity:  Safety Concerns/Precautions:  Fall Risk / High Risk for Falls, Balance, Cognition, and Bed / Chair Alarm  Pain Management    Diet:   DIET ORDERS (From admission to next 24h)       Start     Ordered    08/02/23 1034  Diet Order Diet: Level 6 - Soft and Bite Sized; Liquid level: Level 0 - Thin; Tray Modifications (optional): SLP - Deliver to Nursing Station  ALL MEALS        Question Answer Comment   Diet: Level 6 - Soft and Bite Sized    Liquid level Level 0 - Thin    Tray Modifications (optional) SLP - Deliver to Nursing Station        08/02/23 1034                    Anticipated Discharge Destination / Patient/Family Goal:  Destination: Home with Assistance Support System: Family   Anticipated home health services: OT, PT, SLP, Nursing, and  Aide  Previously used  service/ provider: Not Applicable  Anticipated DME Needs:  To be determined  Outpatient Services:  To be determined  Alternative resources to address additional identified needs:     Future Appointments   Date Time Provider Department Center   8/4/2023  1:30 PM Misa Marie M.D. [facility] None       Pre-Screen Completed: 8/2/2023 3:23 PM Mireya White R.N.

## 2023-08-02 NOTE — THERAPY
Physical Therapy   Initial Evaluation     Patient Name: Doreen Noe  Age:  67 y.o., Sex:  female  Medical Record #: [id number]  Today's Date: 8/2/2023     Precautions  Precautions: Fall Risk;Swallow Precautions  Comments: right weakness and mild neglect    Assessment  Pt presents with impaired dynamic balance, motor control, sensation and attention associated with chief complaint of right Ue/LE weakness, found to have an acute L CAROL ANN CVA in setting of right CAROL ANN CVA in 2016 and seizures. Pt is most limited by right inattention, does have some higher level cognitive deficits that become apparent with further questions and does have some self reported recovery of sensation etc that is not found on testing. Right LE 3-/5 but most impacted by impaired proprioception noted at right ankle, knee and flexor synergy in right arm that can be cued to reduce within session; pt making balance progress with sitting within session and is limited by fatigue but able to tolerate ~ 45 minutes of activity after speech and MRI; mod A to take steps to chair; family is unclear whether being closer to home is more important than intensity of rehab received at this time and will discuss; from a PT perspective, pt is an excellent acute rehab candidate if qualifies and likely to make quick, steady progress. Will have 24/7 support via her significant other once dc'ing and will go to his house with one step to enter. Will follow to progress.     Plan    Physical Therapy Initial Treatment Plan   Treatment Plan : Equipment, Bed Mobility, Gait Training, Manual Therapy, Neuro Re-Education / Balance, Self Care / Home Evaluation, Stair Training, Therapeutic Activities, Therapeutic Exercise  Treatment Frequency: 5 Times per Week  Duration: Until Therapy Goals Met    DC Equipment Recommendations: Unable to determine at this time  Discharge Recommendations: acute rehab if qualifies; otherwise, SNF closer to their (children and significant other's home  in [facility]        Abridged Subjective/Objective     08/02/23 1220   Prior Living Situation   Prior Services None;Home-Independent   Housing / Facility 2 Story House   Steps Into Home 5   Steps In Home 15   Bathroom Set up Bathtub / Shower Combination   Equipment Owned None   Lives with - Patient's Self Care Capacity Alone and Able to Care For Self   Comments can stay with her significant other who has 1 steps; has walk in shower; will have 24/7 support   Prior Level of Functional Mobility   Bed Mobility Independent   Transfer Status Independent   Ambulation Independent   Ambulation Distance to tolerance   Assistive Devices Used None   Stairs Independent   Cognition    Cognition / Consciousness X   Speech/ Communication Delayed Responses;Word Finding Impairment;Expressive Aphasia   Level of Consciousness Alert   Ability To Follow Commands 1 Step   Safety Awareness Impaired   Attention Impaired   Sequencing Impaired   Initiation Impaired   Comments pleasant and cooperative; family at bedside; higher level cognitive deficits noted;   Passive ROM Lower Body   Passive ROM Lower Body WDL   Strength Lower Body   Lower Body Strength  X   Rt Knee Extension Strength 3- (F-)   Rt Ankle Dorsiflexion Strength 2- (P-)   Comments poor toe extension but can hold once placed in that position;   Sensation Lower Body   Lower Extremity Sensation   X   Comments reports intact but with specific testing, proprioception is impaired at ankle and knee of right LE and more in right arm than self reported;   Balance Assessment   Sitting Balance (Static) Fair   Sitting Balance (Dynamic) Fair -   Standing Balance (Static) Fair -   Standing Balance (Dynamic) Poor +   Weight Shift Sitting Good   Weight Shift Standing Fair   Comments initial posterior and right lean, able to hold side laying to left elbow and come back to midline; holding bimanually in center able to find cneter of mass; needing min A to hold static standing but  required right knee blocking for standing/stepping due to unawareness of right LE;   Bed Mobility    Supine to Sit Moderate Assist  (puling on therapist with left UE)   Sit to Supine   (Nt, in chair post)   Gait Analysis   Comments able to take 3 steps to chair with right knee blocking/faciltiation   Functional Mobility   Sit to Stand Moderate Assist   Bed, Chair, Wheelchair Transfer Moderate Assist   Transfer Method Stand Step   Short Term Goals    Short Term Goal # 1 Pt will perform supine<>sit from flat HOB/no railing with supervision within 6 visits to ensure independent mobility at home.   Short Term Goal # 2 Pt will perform sit<>stand with FWW and supervision within 6 visits to ensure independent mobility at home.   Short Term Goal # 3 Pt will ambulate x 150ft with FWW and supervision within 6 visits to ensure independent mobility at home.   Short Term Goal # 4 Pt will ascend/descend 1 step with B UE support and min a within 6 visits to ensure entry/exit of home.   Education Group   Role of Physical Therapist Patient Response Patient;Acceptance;Explanation;Demonstration;Verbal Demonstration;Action Demonstration   Exercises - Seated Patient Response Patient;Acceptance;Explanation;Demonstration;Handout;Action Demonstration;Verbal Demonstration;Reinforcement Needed  (LAQ, ankle pumps with emphasis on quality and mental practice rather than quantitiy)   Additional Comments protein and water in diet; timeline of recovery acute rehab vs SNF

## 2023-08-02 NOTE — THERAPY
Speech Language Pathology   Clinical Swallow Evaluation     Patient Name: Doreen Noe  AGE:  67 y.o., SEX:  female  Medical Record #: [id number]  Date of Service: 8/2/2023      History of Present Illness  67 y.o. female presented [facility]/1/23 with right sided weakness.     PMHx: seizure disorder, previous stroke (2016), chronic tremor, and neuropathy    CMHx: stroke, seizure disorder, thrombocytopenia, hypocalcemia.     CT-CTA Head: 1. Occlusion of distal left pericallosal artery. 2.  Moderate size chronic area of infarction in right anterior frontal lobe.    CXR: Linear left basilar opacities  No pleural effusion. No pneumothorax.       General Information:  O2 Delivery Device: None - Room Air  Level of Consciousness: Alert, Awake  Follows Directives: Yes      Prior Living Situation & Level of Function:  Lives with - Patient's Self Care Capacity: Alone and Able to Care For Self  Communication: WFL  Swallowing: WFL       Oral Mechanism Evaluation:  Dentition: Natural dentition   Facial Symmetry: Equal   Labial Observations: Right sided weakness, WFL   Lingual Observations: Right lingual deviation  Motor Speech: WFL         Laryngeal Function:  Secretion Management: Adequate  Voice Quality: WFL  Cough: Perceptually WNL       Subjective  Patient awake, alert, upright in bed, A&Ox4. Patient reported feeling hungry and eager to begin eating.       Assessment  Current Method of Nutrition: NPO until cleared by speech pathology  Positioning: Dai's (60-90 degrees)  Bolus Administration: SLP, Patient  O2 Delivery Device: None - Room Air  Factor(s) Affecting Performance: None  Tracheostomy : No     Swallowing Trials:  Swallowing Trials  Ice: WFL  Thin Liquid (TN0): WFL  Liquidised (LQ3): WFL  Soft & Bite Sized (SB6): WFL  Regular (RG7): WFL      Comments: Patient required some assistance with feeding (opening containers) due to right-sided weakness, however able to self-feed. Patient demo 'd adequate oral acceptance  "and labial assimilation to all feeding tools. Presumed complete AP transfer of PO trials. Mild oral residue of dry solids, however cleared with liquid wash. Timely mastication of solid consistencies. Occasional throat clear following thin liquid. Per Pt, throat clear present since May after getting off a flight, with intermittent sensation of material \"coming up.\"  Pt denied any history of GERD. No cough response following oral intake. No change in vocal quality throughout PO intake appreciated. Educated Pt on aspiration precautions and to stop oral intake of any difficulty occurs. Pt stated understanding.    Clinical Impressions  Patient appears to present with a functional oropharyngeal swallow for a diet of SB6/TN0. Pt prefers softer foods, requesting diet of soft/bite sized. Occasional throat clear appreciated during evaluation, however per Pt report, consistent with baseline. SLP to check tolerance of diet. Please discontinue diet if any concerns or difficulties present. Diagnostic swallow evaluation pending clinical progress.       Recommendations  Diet Consistency: Soft/bite sized solids (SB6), Thins (TN0)  Instrumentation: Instrumental swallow study pending clinical progress  Medication: As tolerated  Supervision: Encourage self-feeding, Assist with meal tray set up  Positioning: Fully upright and midline during oral intake  Risk Management : Slow rate of intake  Oral Care: Q6h         SLP Treatment Plan  Treatment Plan: Dysphagia Treatment  SLP Frequency: 3x Per Week  Estimated Duration: Until Therapy Goals Met      Anticipated Discharge Needs  Discharge Recommendations:  (Pending clinical progress)   Therapy Recommendations Upon DC: Patient / Family / Caregiver Education        Patient / Family Goals  Patient / Family Goal #1: \"I haven't eaten anything since Monday night\"  Short Term Goals  Short Term Goal # 1: Pt will consume SB6/TN0 diet with no overt s/sx aspiration      Milena Weeks, SLP   "

## 2023-08-02 NOTE — PROGRESS NOTES
Monitor Summary: SR/JR 60-81, GA 0.15, QRS 0.08, QT 0.41, with occasional/rare PVCs, couplets/bigeminy/trigeminy per strip from monitor room.

## 2023-08-02 NOTE — DISCHARGE PLANNING
"Case Management Discharge Planning    Admission Date: 8/1/2023  GMLOS: 2.9  ALOS: 1    6-Clicks ADL Score: 14  6-Clicks Mobility Score: 9  PT and/or OT Eval ordered: Yes  Post-acute Referrals Ordered: Yes  Post-acute Choice Obtained: Yes  Has referral(s) been sent to post-acute provider:  Yes ([facility] per protocol)      Anticipated Discharge Dispo: Discharge Disposition: Disch to  rehab facility or [facility] part unit ()    DME Needed: No    Action(s) Taken: CM met at bedside with patient, son/Hadley, daughter-in-law/Rosanne, and SO/Michael to complete assessment & discuss DCP.  Patient, AAOx4 at time of interview, reported residing alone in two-story house at address listed on Facesheet.  Prior to admission patient was independent with ADLs and ambulating w/o assistive devices.   She denies any DME use and was not active with [facility].  Patient is on disability ($2500/month) and also has rental income.      DCP to IRF vs SNF discussed with patient & family who would like to discuss further among themselves prior to making a decision.    Family inquired about possibility of driving patient to out or area SNF to avoid out-of-pocket transport expense; CM discussed with Dr. Llamas who is agreeable to it.  CM to confirm with accepting facility whether it will be okay with them.    Choice forms left with patient's son.    Escalations Completed: None    Medically Clear: No    Next Steps: CM will obtain IRF/SNF choices    Barriers to Discharge: Medical clearance and Pending Placement    Is the patient up for discharge tomorrow: Potentially    **1648 Hrs - Patient evaluated by [facility] physiatrist, Dr. Dean, and deemed a good candidate for IRF based on \"needs for PT, OT, and speech therapy\".       IRF/SNF choices obtained from patient & family in order of preference as follows:    IRF:  1-[facility], 2-[facility]  SNF:  [facility] Transition Team Assessment    Information Source  Orientation Level: " Oriented X4  Information Given By: Patient  Who is responsible for making decisions for patient? : Patient    Readmission Evaluation  Is this a readmission?: No    Elopement Risk  Legal Hold: No  Ambulatory or Self Mobile in Wheelchair: No-Not an Elopement Risk  Elopement Risk: Not at Risk for Elopement    Interdisciplinary Discharge Planning  Lives with - Patient's Self Care Capacity: Alone and Able to Care For Self  Patient or legal guardian wants to designate a caregiver: No  Housing / Facility: [address]  Prior Services: None, Home-Independent    Discharge Preparedness  What is your plan after discharge?: Other (comment) (IRF vs SNF)  What are your discharge supports?: Child, Other (comment) (Significant Other, daughter-in-law)  Prior Functional Level: Ambulatory, Independent with Activities of Daily Living    Functional Assesment  Prior Functional Level: Ambulatory, Independent with Activities of Daily Living    Finances  Financial Barriers to Discharge: No  Prescription Coverage: Yes     Advance Directive  Advance Directive?: None    Domestic Abuse  Have you ever been the victim of abuse or violence?: No  Physical Abuse or Sexual Abuse: No  Verbal Abuse or Emotional Abuse: No  Possible Abuse/Neglect Reported to:: Not Applicable    Psychological Assessment  History of Substance Abuse: None  History of Psychiatric Problems: No  Non-compliant with Treatment: No  Newly Diagnosed Illness: Yes    Discharge Risks or Barriers  Discharge risks or barriers?: Complex medical needs  Patient risk factors: Complex medical needs    Anticipated Discharge Information  Discharge Disposition: Disch to  rehab facility or distinct part unit (62)

## 2023-08-02 NOTE — CARE PLAN
The patient is Stable - Low risk of patient condition declining or worsening    Shift Goals  Clinical Goals: Monitor Neuro Status/Maintain Skin Integrity/MRI  Patient Goals: Visit with family  Family Goals: Patient to receive MRI    Progress made toward(s) clinical / shift goals: Assumed care of patient at 1915. Patient is A&Ox4, Q4hr neuro checks are being completed. NIHSS = 5, pertinent for RUE drift, RLE 3/5 strengths w/ataxia, and mild-moderate sensory loss. Permisstive HTN orders in place. NPO strict orders are in place. Fall/Seizure/Aspiration precautions are in place. Bed is low and locked, bed alarm is on, call light is within reach, hourly rounding continues.      Problem: Neuro Status  Goal: Neuro status will remain stable or improve  Outcome: Progressing  Note: Q4hr neuro checks - stable     Problem: Skin Integrity  Goal: Skin integrity is maintained or improved  Outcome: Progressing  Note: Waffle overlay in place, patient able to turn herself in bed, encouraging Q2hr turns.     Patient is not progressing towards the following goals:    Problem: Dysphagia  Goal: Dysphagia will improve  Outcome: Not Progressing  Note: SLP consult in place.      Problem: Urinary Elimination  Goal: Establish and maintain regular urinary output  Outcome: Not Progressing  Note: Purewick in place for urinary incontinence.      Problem: Mobility - Stroke  Goal: Patient's capacity to carry out activities will improve  Outcome: Not Progressing  Note: Unable to ambulate patient.

## 2023-08-02 NOTE — PROGRESS NOTES
1955: Patient off the unit via gurney w/transport. Monitor room notified that telemetry monitoring was removed at this time.     2026: Patient returned to unit. Telemetry monitoring in place.     2038: Monitor room notified this RN, patient is in and out of junctional rhythm. Patient is stable. No signs of distress, chest pain/pressure or discomfort. VSS.

## 2023-08-02 NOTE — DISCHARGE PLANNING
[facility] Transitional Care Coordination    Physiatry Dr. Dean recommending good candidate for acute rehab based on PT/OT/Speech needs.      Call out to daughter-in-law Rosanne Hernandez in effort to discuss IRF referral/verify discharge support.  No answer.  Left voice message requesting return call to [facility].  Call out to patient's son Hadley Verdugo (#[phone] - from [employer]) - this is patient's cell phone number, did not leave message.  No additional contact information for family noted in chart at this time.        1620 - Return call from Rosanne Hernandez.  SO Michael Cotto ([phone]) and patients son Hadley Verdugo ([phone]) participated in call.  Explained specifics of inpatient rehab answered questions/concerns.  Discussed comprehensive medical surveillance by Physiatry/Hospitalist team, ongoing nursing care and therapy regimen 3 hours per day, 5 days a week for individuals admitted RR.   Rosanne/Hadley/Michael will work as team to provide primary return to community support.   Current plan is for Doreen to discharge to Michael's home in [facility], [address]; walk-in showers.  Michael believes he will be able to meet Doreen's care needs at discharge, including physical assist.  Encouraged visitor attendance/observation during therapy sessions as able.   Reviewed [facility] therapy team for family training prior to discharge.   Discussed [facility] CM to assist through discharge process.  Reviewed [facility] Medicare HMO likely OON for RRH,  will require prior auth if there is OON benefit.  Rehab PAR to clarify benefits.   At conclusion of call, Hadley stated agreement with pursuing auth request for admission RRH.   Provided [facility] contact information for any additional questions.      RR PAR to clarify insurance benefits.  PAS prepared, will submit auth request if appropriate.

## 2023-08-02 NOTE — DOCUMENTATION QUERY
[facility]                                                                       Query Response Note      PATIENT:               YOLY BOGGS  ACCT #:                  [id number]  MRN:                     [id number]  :                      1955  ADMIT DATE:       2023 8:51 AM  DISCH DATE:          RESPONDING  PROVIDER #:        [id number]           QUERY TEXT:    Please clarify in documentation the relationship, if any, between CVA and right sided weakness.    NOTE: If an appropriate response is not listed below, please respond with a new note.    Thank you.      The patient's Clinical Indicators include:   H&P: Acute right sided weakness.    ED Note: CVA. Pt woke today with RUE and RLE weakness.    Risk factor: CVA    Treatment: ASA, statin, serial neuro exams    Thank you,  Damien Mccabe  Clinical   Connect via [employer]  Options provided:   -- Right sided weakness is due to CVA   -- Right sided weakness is not due to CVA   -- Other explanation, Please specify   -- Unable to determine      Query created by: Damien Mccabe on 2023 7:59 AM    RESPONSE TEXT:    Right sided weakness is due to CVA          Electronically signed by:  REBA GONZALEZ MD 2023 8:19 AM

## 2023-08-02 NOTE — CONSULTS
Physical Medicine and Rehabilitation Consultation              Date of initial consultation: 8/2/2023  Requesting provider: ordered by Mary Bello M.D. at 08/02/23 1348  Consulting provider: Adwoa Dean D.O.  Reason for consultation: assess for acute inpatient rehab appropriateness  LOS: 1 Day(s)    Chief complaint: new onset R sided weakness     HPI: The patient is a 67 y.o.  female with a past medical history of seizure disorder, prior stroke, chronic tremors, and neuropathy;  who presented on 8/1/2023  8:51 AM with new onset Right sided weakness. Per documentation, patient awoke with right sided weakness and rolled of her bed. She did not hit head or LOC. 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 Neuro consulted, patient outside the window for TNKase and was not a candidate for IR intervention given no LVO. MRI brain was obtained that showed small acute ischemic stroke in the L CAROL ANN territory. Echo is pending. Neuro recommending starting eliquis and statin for secondary stroke ppx. BP goals is 100-130/60-80. Patient has been able to participate with therapies. She is Mod A for sit to stand.     Patient seen and examined at bedside with family. Patient reports that she feels ok. Denies  HA, lightheadedness, SOB, CP, abdominal pain, or changes in numbness/tingling/weakness.   + Right sided weakness  LLE > LUE.       Social Hx:  Patient lives alone, but can stay with SO who lives in a single story house. Patient lives in a 2 story house with 5 DENZEL.   5 [address]ZEL  At prior level of function patient was Independent with mobility and ADLs.     Tobacco: Denies   Alcohol: Occasional   Drugs: +inhaled drug use     THERAPY:  Restrictions: Fall Risk, Swallow Precautions   PT: Functional mobility   Pending     OT: ADLs  8/2  Mod A sit to stand, Mod A transfers, Max A lower body dressing     SLP:    8/2 soft and bite sized solids with thins     IMAGING:  MR-BRAIN-W/O   Final Result           Acute infarct in the left medial frontal region in the left posterior CAROL ANN distribution.       Remote right frontal infarct with encephalomalacia.       Age-related volume loss.       DX-CHEST-PORTABLE (1 VIEW)   Final Result           Linear left basilar atelectasis       CT-CTA NECK WITH & W/O-POST PROCESSING   Final Result       CT angiogram of the neck within normal limits.       CT-CTA HEAD WITH & W/O-POST PROCESS   Final Result       1.  Occlusion of distal left pericallosal artery.       2.  Moderate size chronic area of infarction in right anterior frontal lobe.       3.  Case discussed with Dr. Ahmadi at 9:35 AM 8/1/2023.       CT-CEREBRAL PERFUSION ANALYSIS   Final Result       1.  Cerebral blood flow less than 30% likely representing completed infarct = 0 mL.       2.  T Max more than 6 seconds likely representing combination of completed infarct and ischemia = 3 mL.       3.  Mismatched volume likely representing ischemic brain/penumbra = 3       4.  Please note that the cerebral perfusion was performed on the limited brain tissue around the basal ganglia region. Infarct/ischemia outside the CT perfusion sections can be missed in this study.       CT-HEAD W/O   Final Result       1.  Cerebral atrophy.       2.  White matter lucencies most consistent with small vessel ischemic change versus demyelination or gliosis.       3. Moderate wedge-shaped area of chronic infarction in the right anterior frontal lobe.       PROCEDURES:  None     PMH:  Past Medical History:   Diagnosis Date    Hyperlipidemia     Muscle disorder     Neuropathy (HCC)     from right shoulder injury    Seizure disorder (HCC)     10/25/2019     Stroke (HCC)     01/28/2019     Substance abuse (HCC)     Thrombocytopenia (HCC) 8/1/2023       PSH:  Past Surgical History:   Procedure Laterality Date    NE COLONOSCOPY,DIAGNOSTIC N/A 9/28/2021     "Procedure: COLONOSCOPY;  Surgeon: Nir Hemphill M.D.;  Location: SURGERY [facility];  Service: Gastroenterology    MD NEUROPLASTY & OR TRANSPOS MEDIAN NRV CARPAL KEVIN Left 1/21/2020    Procedure: Left carpal tunnel release.;  Surgeon: Nir Marin M.D.;  Location: SURGERY [facility];  Service: Orthopedics       FHX:  Family History   Problem Relation Age of Onset    Hypertension Mother     Dementia Mother     Heart Failure Mother     Heart Failure Father     Heart Attack Father     Kidney Disease Father     Hypertension Father        Medications:  Current Facility-Administered Medications   Medication Dose    apixaban (Eliquis) tablet 5 mg  5 mg    calcium carbonate (Tums) chewable tab 500 mg  500 mg    atorvastatin (Lipitor) tablet 80 mg  80 mg    labetalol (Normodyne/Trandate) injection 10 mg  10 mg    hydrALAZINE (Apresoline) injection 10 mg  10 mg    NS (Bolus) infusion 500 mL  500 mL    lamoTRIgine (LaMICtal) tablet 150 mg  150 mg       Allergies:  No Known Allergies    Physical Exam:  Vitals: BP (!) 147/73   Pulse (!) 59   Temp 36.6 °C (97.9 °F) (Temporal)   Resp 16   Ht 1.702 m (5' 7\")   Wt 70.8 kg (156 lb)   SpO2 96%   Gen: NAD, laying comfortably in bed   Head:  NC/AT  Eyes/ Nose/ Mouth: PERRLA, moist mucous membranes  Cardio: RRR, good distal perfusion, warm extremities  Pulm: normal respiratory effort, no cyanosis, on RA   Abd: Soft NTND, negative borborygmi   Ext: No peripheral edema. No calf tenderness. No clubbing.    Mental status:  A&Ox4 (person, place, date, situation) answers questions appropriately follows commands  Speech: fluent, no aphasia or dysarthria, mild delay in answering questions     CRANIAL NERVES:  2,3: visual acuity grossly intact, PERRL  3,4,6: EOMI bilaterally, no nystagmus or diplopia  5: sensation intact to light touch bilaterally and symmetric  7: no facial asymmetry      Motor:       Upper Extremity  Myotome R L   Shoulder flexion C5 5/5 5/5   Elbow flexion C5 5/5 " 5/5   Wrist extension C6 5/5 5/5   Elbow extension C7 5/5 5/5   Finger flexion C8 4/5 5/5   Finger abduction T1 5/5 5/5     Lower Extremity Myotome R L   Hip flexion L2 1/5 5/5   Knee extension L3 0/5 5/5   Ankle dorsiflexion L4 0/5 5/5   Toe extension L5 0/5 5/5   Ankle plantarflexion S1 0/5 5/5       Negative Pronator drift bilaterally    Sensory:   intact to light touch through out     DTRs: 2+ in bilateral  biceps, 2+ in bilateral patellar tendons  No clonus at bilateral ankles  Negative Capps b/l     Tone: no spasticity noted,    Coordination:   intact finger to nose bilaterally, needs extra time for motor control with LUE, but not dysmetric   intact fine motor with fingers bilaterally      Labs: Reviewed and significant for   Recent Labs     08/01/23  0855 08/02/23  0341   RBC 4.92 5.12   HEMOGLOBIN 14.8 15.2   HEMATOCRIT 44.7 46.0   PLATELETCT 156* 168   PROTHROMBTM 13.5  --    APTT 23.4*  --    INR 1.04  --      Recent Labs     08/01/23  0855 08/02/23  0341   SODIUM 145 141   POTASSIUM 3.6 3.8   CHLORIDE 111 107   CO2 24 21   GLUCOSE 143* 100*   BUN 15 12   CREATININE 0.64 0.62   CALCIUM 8.2* 9.3     Recent Results (from the past 24 hour(s))   Lipid Profile    Collection Time: 08/02/23  3:41 AM   Result Value Ref Range    Cholesterol,Tot 214 (H) 100 - 199 mg/dL    Triglycerides 137 0 - 149 mg/dL    HDL 41 >=40 mg/dL     (H) <100 mg/dL   CBC WITH DIFFERENTIAL    Collection Time: 08/02/23  3:41 AM   Result Value Ref Range    WBC 7.2 4.8 - 10.8 K/uL    RBC 5.12 4.20 - 5.40 M/uL    Hemoglobin 15.2 12.0 - 16.0 g/dL    Hematocrit 46.0 37.0 - 47.0 %    MCV 89.8 81.4 - 97.8 fL    MCH 29.7 27.0 - 33.0 pg    MCHC 33.0 32.2 - 35.5 g/dL    RDW 41.4 35.9 - 50.0 fL    Platelet Count 168 164 - 446 K/uL    MPV 9.9 9.0 - 12.9 fL    Neutrophils-Polys 65.10 44.00 - 72.00 %    Lymphocytes 25.00 22.00 - 41.00 %    Monocytes 7.20 0.00 - 13.40 %    Eosinophils 1.70 0.00 - 6.90 %    Basophils 0.60 0.00 - 1.80 %    Immature  Granulocytes 0.40 0.00 - 0.90 %    Nucleated RBC 0.00 0.00 - 0.20 /100 WBC    Neutrophils (Absolute) 4.69 1.82 - 7.42 K/uL    Lymphs (Absolute) 1.80 1.00 - 4.80 K/uL    Monos (Absolute) 0.52 0.00 - 0.85 K/uL    Eos (Absolute) 0.12 0.00 - 0.51 K/uL    Baso (Absolute) 0.04 0.00 - 0.12 K/uL    Immature Granulocytes (abs) 0.03 0.00 - 0.11 K/uL    NRBC (Absolute) 0.00 K/uL   Basic Metabolic Panel    Collection Time: 08/02/23  3:41 AM   Result Value Ref Range    Sodium 141 135 - 145 mmol/L    Potassium 3.8 3.6 - 5.5 mmol/L    Chloride 107 96 - 112 mmol/L    Co2 21 20 - 33 mmol/L    Glucose 100 (H) 65 - 99 mg/dL    Bun 12 8 - 22 mg/dL    Creatinine 0.62 0.50 - 1.40 mg/dL    Calcium 9.3 8.5 - 10.5 mg/dL    Anion Gap 13.0 7.0 - 16.0   HEMOGLOBIN A1C    Collection Time: 08/02/23  3:41 AM   Result Value Ref Range    Glycohemoglobin 5.7 (H) 4.0 - 5.6 %    Est Avg Glucose 117 mg/dL   ESTIMATED GFR    Collection Time: 08/02/23  3:41 AM   Result Value Ref Range    GFR (CKD-EPI) 98 >60 mL/min/1.73 m 2         ASSESSMENT:  Patient is a 67 y.o. female admitted with right sided weakness     [facility] Code / Diagnosis to Support: 0001.1 - Stroke: Left Body Involvement (Right Brain)    Rehabilitation: Impaired ADLs and mobility  Patient is a good candidate for inpatient rehab based on needs for PT, OT, and speech therapy, see dispo details below.     Barriers to transfer include: Insurance authorization, TCCs to verify disposition, medical clearance and bed availability     Additional Recommendations:  L CAROL ANN stroke   Right sided weakness  - originally presented with R sided weakness , RLE weakness > RUE weakness, impaired motor control or RUE   - CT head showed moderate area of chronic infarction in the R anterior lobe   - CTA head showed occlusion of the left pericallosal artery   - Neuro consulted, neuro IR reviewed case   - outside window for TNKase and not a candidate for IR intervention given no LVO   - MRI brain showed acute infarct in  the left medial frontal region and in the left posterior CAROL ANN distribution   - secondary stroke ppx with eliquis and statin    - ECHO pending   - continue with PT/OT/SLP     Seizure disorder   - continue home dose Lamictal     HTN  - SBP up to 196 , improved to 147   - IV labetaolol, not currently on oral antihypertensives     Dispo:  - patient is currently functioning below their level of baseline, recommend post acute rehab  - recommend IRF level therapy with 3hr of therapy 5 days per week  - piror to acceptance to IRF, will need insurance auth from [facility] and confirmation of DC support from significant other (Michael) and son + daughter in law    - [facility] to assist with insurance auth and DC support       Medical Complexity:  L CAROL ANN stroke   Right sided weakness  Seizure disorder   Impaired mobility and ADLs       DVT PPX: eliquis       Thank you for allowing us to participate in the care of this patient.     Patient was seen for 82 minutes on unit/floor of which > 50% of time was spent on counseling and coordination of care regarding the above, including prognosis, risk reduction, benefits of treatment, and options for next stage of care.    Adwoa Dean D.O.   Physical Medicine and Rehabilitation     Please note that this dictation was created using voice recognition software. I have made every reasonable attempt to correct obvious errors, but there may be errors of grammar and possibly content that I did not discover before finalizing the note.

## 2023-08-02 NOTE — PROGRESS NOTES
Chief Complaint   Patient presents with    Extremity Weakness     Pt woke today at 07:15 with RUE and RLE weakness, LKW last night approx 23:00, RUE symptoms have improved on arrival and only has mild drift, pt is unable to move RLE, sensation intact.        Problem List Items Addressed This Visit    None  Visit Diagnoses       Cerebrovascular accident (CVA) due to occlusion of left anterior cerebral artery (HCC)        Relevant Medications    atorvastatin (Lipitor) tablet 80 mg    labetalol (Normodyne/Trandate) injection 10 mg    hydrALAZINE (Apresoline) injection 10 mg    apixaban (Eliquis) tablet 5 mg    Other Relevant Orders    Referral to Physiatry (PMR)    Referral to Neurology            Neurology Stroke Progress Note      History of present illness:  This is a 67-year old female with PMHx significant for seizure disorder (reportedly nocturnal seizures) secondary to Right frontal stroke (2016; on Lamictal), dyslipidemia, further details limited who presented to [facility] on 8/1/23 for a chief complaint of Right LE > Right UE weakness. The patient states that she went to sleep in her usual state of health last night at 200; when she awoke at 0715, she had difficulty moving her RUE/RLE. She called EMS; on scene BG WNL, SBP 120s. She was reportedly profoundly weak to both RUE/RLE; en route symptoms to RUE improved; RLE remains nearly flaccid at time of arrival. Here, Stat CT head revealed no acute intracranial abnormality; did note encephalomalacia to Right frontal region consistent with chronic stroke. CTA head/neck with no obvious LVO nor hemodynamically significant stenosis; CT perfusion study with small perfusion mismatch well correlating to RLE weakness/Left CAROL ANN territory; unfortunately patient not a candidate for IV thrombolytics secondary to presentation time greater than 4.5 hours from time LKW; no IR intervention given no LVO. Currently patient is sitting up in bed; awake and alert; still with  RLE>RUE weakness. She denies numbness, problem with vision, speech or swallowing, headache or dizziness.     Neurology has been consulted by Dr. Bimal Ramon to further evaluate the findings noted above.     Interval, 8/2/23:  Patient is laying in bed; awake and alert, feels well, better today. Now with some antigravity movement to RLE; appx 3+/5. Awaiting PT/OT.     MRI Brain wo contrast has revealed small acute ischemic stroke involving Left CAROL ANN territory; invariably embolic-- suspect cardioembolic, TTE pending. Given secondary embolic appearing stroke feel it most prudent to initiate empiric anticoagulation, will start today/this evening.     No changes to HPI as was previously documented.     Past medical history:   Past Medical History:   Diagnosis Date    Hyperlipidemia     Muscle disorder     Neuropathy (HCC)     from right shoulder injury    Seizure disorder (HCC)     10/25/2019     Stroke (HCC)     01/28/2019     Substance abuse (HCC)     Thrombocytopenia (HCC) 8/1/2023       Past surgical history:   Past Surgical History:   Procedure Laterality Date    SC COLONOSCOPY,DIAGNOSTIC N/A 9/28/2021    Procedure: COLONOSCOPY;  Surgeon: Nir Hemphill M.D.;  Location: SURGERY [facility];  Service: Gastroenterology    SC NEUROPLASTY & OR TRANSPOS MEDIAN NRV CARPAL KEVIN Left 1/21/2020    Procedure: Left carpal tunnel release.;  Surgeon: Nir Marin M.D.;  Location: SURGERY [facility];  Service: Orthopedics       Family history:   Family History   Problem Relation Age of Onset    Hypertension Mother     Dementia Mother     Heart Failure Mother     Heart Failure Father     Heart Attack Father     Kidney Disease Father     Hypertension Father        Social history:   Social History     Socioeconomic History    Marital status:      Spouse name: Not on file    Number of children: Not on file    Years of education: Not on file    Highest education level: Not on file   Occupational History    Not on file    Tobacco Use    Smoking status: Never    Smokeless tobacco: Never   Vaping Use    Vaping Use: Never used   Substance and Sexual Activity    Alcohol use: Yes    Drug use: Yes     Types: Inhaled     Comment: Marijuana    Sexual activity: Not on file   Other Topics Concern    Not on file   Social History Narrative    Not on file     Social Determinants of Health     Financial Resource Strain: Not on file   Food Insecurity: Not on file   Transportation Needs: Not on file   Physical Activity: Not on file   Stress: Not on file   Social Connections: Not on file   Intimate Partner Violence: Not on file   Housing Stability: Not on file       Current medications:   Current Facility-Administered Medications   Medication Dose    apixaban (Eliquis) tablet 5 mg  5 mg    calcium carbonate (Tums) chewable tab 500 mg  500 mg    atorvastatin (Lipitor) tablet 80 mg  80 mg    labetalol (Normodyne/Trandate) injection 10 mg  10 mg    hydrALAZINE (Apresoline) injection 10 mg  10 mg    NS (Bolus) infusion 500 mL  500 mL    lamoTRIgine (LaMICtal) tablet 150 mg  150 mg       Medication Allergy:  No Known Allergies    Review of systems:   Constitutional: denies fever, night sweats, weight loss.   Eyes: denies acute vision change, eye pain or secretion.   Ears, Nose, Mouth, Throat: denies nasal secretion, nasal bleeding, difficulty swallowing, hearing loss, tinnitus, vertigo, ear pain, acute dental problems, oral ulcers or lesions.   Endocrine: denies recent weight changes, heat or cold intolerance, polyuria, polydypsia, polyphagia,abnormal hair growth.  Cardiovascular: denies new onset of chest pain, palpitations, syncope, or dyspnea of exertion.  Pulmonary: denies shortness of breath, new onset of cough, hemoptysis, wheezing, chest pain or flu-like symptoms.   GI: denies nausea, vomiting, diarrhea, GI bleeding, change in appetite, abdominal pain, and change in bowel habits.  : denies dysuria, urinary incontinence,  hematuria.  Heme/oncology: denies history of easy bruising or bleeding. No history of cancer, DVTor PE.  Allergy/immunology: denies hives/urticaria, or itching.   Dermatologic: denies new rash, or new skin lesions.  Musculoskeletal:denies joint swelling or pain, muscle pain, neck and back pain.   Neurologic: As noted in detail above.   Psychiatric: denies symptoms of depression, anxiety, hallucinations, mood swings or changes, suicidal or homicidal thoughts.     Physical examination:   Vitals:    08/01/23 1935 08/02/23 0000 08/02/23 0400 08/02/23 0724   BP: (!) 171/78 (!) 160/82 (!) 156/94 (!) 143/92   Pulse: 65  78 61   Resp: 17 16 17 16   Temp: 36.6 °C (97.9 °F) 36.3 °C (97.3 °F) 36.4 °C (97.5 °F) 36.6 °C (97.9 °F)   TempSrc: Temporal Temporal Temporal Temporal   SpO2: 92% 95% 92% 96%   Weight:       Height:         General: Patient in no acute distress.  HEENT: Normocephalic, no signs of acute trauma.   Neck: supple, no meningeal signs or carotid bruits. There is normal range of motion. No tenderness on exam.   Chest: clear to auscultation. No cough.   CV: RRR, no murmurs.   Skin: no signs of acute rashes or trauma.   Musculoskeletal: joints exhibit full range of motion, without any pain to palpation. There are no signs of joint or muscle swelling. There is no tenderness to deep palpation of muscles.   Psychiatric: No hallucinatory behavior.       NEUROLOGICAL EXAM:   Mental status, orientation: Awake, alert and fully oriented.   Speech and language: speech is clear and fluent. The patient is able to name, repeat and comprehend.    Cranial nerve exam: Pupils are 3-4 mm bilaterally and equally reactive to light. Visual fields are intact by confrontation. There is no nystagmus on primary or secondary gaze. Intact full EOM in all directions of gaze.  Face appears symmetric. Sensation in the face is intact to light touch. Uvula is midline. Palate elevates symmetrically. Tongue is midline and without any signs of  tongue biting or fasciculations. Shoulder shrug is intact bilaterally.   Motor exam: Strength is 5/5 in LUE/LLE; 4+/5 to RUE with slight drift; 3+/5 to RLE, antigravity. Tone is normal. No abnormal movements were seen on exam.   Sensory exam reveals normal sense of light touch and pinprick in all extremities.   Deep tendon reflexes:  Plantar responses are flexor. There is no clonus.   Coordination: shows a normal finger-nose-finger.   Gait:Not assessed at this time as patient is a fall risk.       NIH Stroke Scale    1a Level of Consciousness   1b Orientation Questions   1c Response to Commands   2 Gaze   3 Visual Fields   4 Facial Movement   5 Motor Function (arm)   a Left   b Right 1  6 Motor Function (leg)   a Left   b Right 2  7 Limb Ataxia   8 Sensory   9 Language   10 Articulation   11 Extinction/Inattention     Score: 3        ANCILLARY DATA REVIEWED:     Lab Data Review:  Recent Results (from the past 24 hour(s))   EKG (NOW)    Collection Time: 23  9:52 AM   Result Value Ref Range    Report       [facility] Emergency Dept.    Test Date:  2023  Pt Name:    YOLY BOGGS                 Department: ER  MRN:        [id number]                      Room:        10  Gender:     Female                       Technician: [id number]  :        [id number]                   Requested By:JACOB AMSO  Order #:    [id number]                    Reading MD:    Measurements  Intervals                                Axis  Rate:       66                           P:          74  NY:         196                          QRS:        54  QRSD:       96                           T:          49  QT:         433  QTc:        454    Interpretive Statements  Sinus rhythm  Atrial premature complex  Probable left ventricular hypertrophy  No previous ECG available for comparison     ABO Rh Confirm    Collection Time: 23  9:57 AM   Result Value Ref Range    ABO Rh Confirm A POS    URINE DRUG SCREEN     Collection Time: 08/01/23 12:14 PM   Result Value Ref Range    Amphetamines Urine Negative Negative    Barbiturates Negative Negative    Benzodiazepines Negative Negative    Cocaine Metabolite Negative Negative    Fentanyl, Urine Negative Negative    Methadone Negative Negative    Opiates Negative Negative    Oxycodone Negative Negative    Phencyclidine -Pcp Negative Negative    Propoxyphene Negative Negative    Cannabinoid Metab Negative Negative   Lipid Profile    Collection Time: 08/02/23  3:41 AM   Result Value Ref Range    Cholesterol,Tot 214 (H) 100 - 199 mg/dL    Triglycerides 137 0 - 149 mg/dL    HDL 41 >=40 mg/dL     (H) <100 mg/dL   CBC WITH DIFFERENTIAL    Collection Time: 08/02/23  3:41 AM   Result Value Ref Range    WBC 7.2 4.8 - 10.8 K/uL    RBC 5.12 4.20 - 5.40 M/uL    Hemoglobin 15.2 12.0 - 16.0 g/dL    Hematocrit 46.0 37.0 - 47.0 %    MCV 89.8 81.4 - 97.8 fL    MCH 29.7 27.0 - 33.0 pg    MCHC 33.0 32.2 - 35.5 g/dL    RDW 41.4 35.9 - 50.0 fL    Platelet Count 168 164 - 446 K/uL    MPV 9.9 9.0 - 12.9 fL    Neutrophils-Polys 65.10 44.00 - 72.00 %    Lymphocytes 25.00 22.00 - 41.00 %    Monocytes 7.20 0.00 - 13.40 %    Eosinophils 1.70 0.00 - 6.90 %    Basophils 0.60 0.00 - 1.80 %    Immature Granulocytes 0.40 0.00 - 0.90 %    Nucleated RBC 0.00 0.00 - 0.20 /100 WBC    Neutrophils (Absolute) 4.69 1.82 - 7.42 K/uL    Lymphs (Absolute) 1.80 1.00 - 4.80 K/uL    Monos (Absolute) 0.52 0.00 - 0.85 K/uL    Eos (Absolute) 0.12 0.00 - 0.51 K/uL    Baso (Absolute) 0.04 0.00 - 0.12 K/uL    Immature Granulocytes (abs) 0.03 0.00 - 0.11 K/uL    NRBC (Absolute) 0.00 K/uL   Basic Metabolic Panel    Collection Time: 08/02/23  3:41 AM   Result Value Ref Range    Sodium 141 135 - 145 mmol/L    Potassium 3.8 3.6 - 5.5 mmol/L    Chloride 107 96 - 112 mmol/L    Co2 21 20 - 33 mmol/L    Glucose 100 (H) 65 - 99 mg/dL    Bun 12 8 - 22 mg/dL    Creatinine 0.62 0.50 - 1.40 mg/dL    Calcium 9.3 8.5 - 10.5 mg/dL    Anion Gap  13.0 7.0 - 16.0   HEMOGLOBIN A1C    Collection Time: 08/02/23  3:41 AM   Result Value Ref Range    Glycohemoglobin 5.7 (H) 4.0 - 5.6 %    Est Avg Glucose 117 mg/dL   ESTIMATED GFR    Collection Time: 08/02/23  3:41 AM   Result Value Ref Range    GFR (CKD-EPI) 98 >60 mL/min/1.73 m 2       Labs reviewed by me.         Imaging reviewed by me:     MR-BRAIN-W/O   Final Result         Acute infarct in the left medial frontal region in the left posterior CAROL ANN distribution.      Remote right frontal infarct with encephalomalacia.      Age-related volume loss.      DX-CHEST-PORTABLE (1 VIEW)   Final Result         Linear left basilar atelectasis      CT-CTA NECK WITH & W/O-POST PROCESSING   Final Result      CT angiogram of the neck within normal limits.      CT-CTA HEAD WITH & W/O-POST PROCESS   Final Result      1.  Occlusion of distal left pericallosal artery.      2.  Moderate size chronic area of infarction in right anterior frontal lobe.      3.  Case discussed with Dr. Ahmadi at 9:35 AM 8/1/2023.      CT-CEREBRAL PERFUSION ANALYSIS   Final Result      1.  Cerebral blood flow less than 30% likely representing completed infarct = 0 mL.      2.  T Max more than 6 seconds likely representing combination of completed infarct and ischemia = 3 mL.      3.  Mismatched volume likely representing ischemic brain/penumbra = 3      4.  Please note that the cerebral perfusion was performed on the limited brain tissue around the basal ganglia region. Infarct/ischemia outside the CT perfusion sections can be missed in this study.      CT-HEAD W/O   Final Result      1.  Cerebral atrophy.      2.  White matter lucencies most consistent with small vessel ischemic change versus demyelination or gliosis.      3. Moderate wedge-shaped area of chronic infarction in the right anterior frontal lobe.         EC-ECHOCARDIOGRAM COMPLETE W/O CONT    (Results Pending)         Presumed mechanism by TOAST:  __Large Artery Atherosclerosis  __Small  Vessel (Lacunar)  _X_Cardioembolic  __Other (Sickle Cell, Vasculitis, Hypercoagulable)  __Unknown    Modified [place] Scale (MRS): 1 = No significant disability, despite symptoms; able to perform all usual duties and activities      ASSESSMENT AND PLAN:  67-year old female with PMHx significant for seizure disorder (reportedly nocturnal seizures) secondary to Right frontal stroke (2016; on Lamictal), dyslipidemia, further details limited who presented to [facility] on 8/1/23 for a chief complaint of Right LE > Right UE weakness; LKW 2300/bed time 7/31. Here, CT head revealed no acute intracranial abnormality; did note encephalomalacia to Right frontal region consistent with chronic stroke. CTA head/neck with no obvious LVO nor hemodynamically significant stenosis; CT perfusion study with small perfusion mismatch well correlating to RLE weakness/Left CAROL ANN territory; unfortunately patient not a candidate for IV thrombolytics secondary to presentation time greater than 4.5 hours from time LKW; no IR intervention given no LVO.   MRI Brain wo contrast has revealed acute ischemic stroke involving Left CAROL ANN territory; invariably embolic-- suspect cardioembolic, TTE pending. Given secondary embolic appearing stroke feel it most prudent to initiate empiric anticoagulation, will start today/this evening. I counseled patient regarding importance of compliance and she voices her understanding.     Recommendations/Plan:    -q4h and PRN neuro assessment. VS per nursing/unit protocol. BP goal is normotension, 100-130/60-80. Antihypertensives per primary team.   -Telemetry; currently SR. Screen for Afib/arrhythmia. Obtain TTE-- still pending.   -Stop ASA, start Eliquis 5 mg PO BID this evening.   -Atorvastatin 80 mg PO q HS. Note LDL is 146, goal < 70.   -Recommend aggressive BG management per primary team. Avoid IVF with Dextrose. BG goal 140-180. hemoglobin A1c is 5.7.   -PT/OT/SLP eval and treat. Physiatry consult.    -Counseled patient at length regarding life style and risk factor modification for secondary stroke prevention, including importance of medication compliance.   -Continue home dose Lamictal for seizure ppx.   -All other medical management per primary team.   -DVT PPX: SCDs.      The plan of care above has been discussed with Dr. Russ Laura. Please call with questions.     JAYLA Martinez.  [facility]

## 2023-08-02 NOTE — CARE PLAN
The patient is Stable - Low risk of patient condition declining or worsening    Shift Goals  Clinical Goals: neuro exam  Patient Goals: get stronger  Family Goals: kristina    Progress made toward(s) clinical / shift goals:    Problem: Optimal Care of the Stroke Patient  Goal: Optimal acute care for the stroke patient  Outcome: Progressing     Problem: Neuro Status  Goal: Neuro status will remain stable or improve  Outcome: Progressing       Patient is not progressing towards the following goals:

## 2023-08-03 ENCOUNTER — APPOINTMENT (OUTPATIENT)
Dept: CARDIOLOGY | Facility: MEDICAL CENTER | Age: 68
DRG: 065 | End: 2023-08-03
Attending: NURSE PRACTITIONER
Payer: COMMERCIAL

## 2023-08-03 ENCOUNTER — PATIENT OUTREACH (OUTPATIENT)
Dept: SCHEDULING | Facility: IMAGING CENTER | Age: 68
End: 2023-08-03
Payer: COMMERCIAL

## 2023-08-03 LAB
BASOPHILS # BLD AUTO: 0.5 % (ref 0–1.8)
BASOPHILS # BLD: 0.03 K/UL (ref 0–0.12)
EOSINOPHIL # BLD AUTO: 0.13 K/UL (ref 0–0.51)
EOSINOPHIL NFR BLD: 2 % (ref 0–6.9)
ERYTHROCYTE [DISTWIDTH] IN BLOOD BY AUTOMATED COUNT: 41.1 FL (ref 35.9–50)
HCT VFR BLD AUTO: 47.8 % (ref 37–47)
HGB BLD-MCNC: 16.1 G/DL (ref 12–16)
IMM GRANULOCYTES # BLD AUTO: 0.04 K/UL (ref 0–0.11)
IMM GRANULOCYTES NFR BLD AUTO: 0.6 % (ref 0–0.9)
LV EJECT FRACT MOD 2C 99903: 58.36
LV EJECT FRACT MOD 4C 99902: 68.7
LV EJECT FRACT MOD BP 99901: 63.89
LYMPHOCYTES # BLD AUTO: 1.77 K/UL (ref 1–4.8)
LYMPHOCYTES NFR BLD: 26.7 % (ref 22–41)
MCH RBC QN AUTO: 30 PG (ref 27–33)
MCHC RBC AUTO-ENTMCNC: 33.7 G/DL (ref 32.2–35.5)
MCV RBC AUTO: 89.2 FL (ref 81.4–97.8)
MONOCYTES # BLD AUTO: 0.57 K/UL (ref 0–0.85)
MONOCYTES NFR BLD AUTO: 8.6 % (ref 0–13.4)
NEUTROPHILS # BLD AUTO: 4.08 K/UL (ref 1.82–7.42)
NEUTROPHILS NFR BLD: 61.6 % (ref 44–72)
NRBC # BLD AUTO: 0 K/UL
NRBC BLD-RTO: 0 /100 WBC (ref 0–0.2)
PLATELET # BLD AUTO: 189 K/UL (ref 164–446)
PMV BLD AUTO: 9.9 FL (ref 9–12.9)
RBC # BLD AUTO: 5.36 M/UL (ref 4.2–5.4)
WBC # BLD AUTO: 6.6 K/UL (ref 4.8–10.8)

## 2023-08-03 PROCEDURE — 92523 SPEECH SOUND LANG COMPREHEN: CPT

## 2023-08-03 PROCEDURE — 700102 HCHG RX REV CODE 250 W/ 637 OVERRIDE(OP): Performed by: HOSPITALIST

## 2023-08-03 PROCEDURE — 770020 HCHG ROOM/CARE - TELE (206)

## 2023-08-03 PROCEDURE — 700102 HCHG RX REV CODE 250 W/ 637 OVERRIDE(OP): Performed by: INTERNAL MEDICINE

## 2023-08-03 PROCEDURE — 700102 HCHG RX REV CODE 250 W/ 637 OVERRIDE(OP): Performed by: NURSE PRACTITIONER

## 2023-08-03 PROCEDURE — 85025 COMPLETE CBC W/AUTO DIFF WBC: CPT

## 2023-08-03 PROCEDURE — 99232 SBSQ HOSP IP/OBS MODERATE 35: CPT | Performed by: NURSE PRACTITIONER

## 2023-08-03 PROCEDURE — 99232 SBSQ HOSP IP/OBS MODERATE 35: CPT | Performed by: INTERNAL MEDICINE

## 2023-08-03 PROCEDURE — 92526 ORAL FUNCTION THERAPY: CPT

## 2023-08-03 PROCEDURE — A9270 NON-COVERED ITEM OR SERVICE: HCPCS | Performed by: INTERNAL MEDICINE

## 2023-08-03 PROCEDURE — 97530 THERAPEUTIC ACTIVITIES: CPT | Mod: CQ

## 2023-08-03 PROCEDURE — 36415 COLL VENOUS BLD VENIPUNCTURE: CPT

## 2023-08-03 PROCEDURE — 97116 GAIT TRAINING THERAPY: CPT | Mod: CQ

## 2023-08-03 PROCEDURE — 93306 TTE W/DOPPLER COMPLETE: CPT | Mod: 26 | Performed by: STUDENT IN AN ORGANIZED HEALTH CARE EDUCATION/TRAINING PROGRAM

## 2023-08-03 PROCEDURE — A9270 NON-COVERED ITEM OR SERVICE: HCPCS | Performed by: NURSE PRACTITIONER

## 2023-08-03 PROCEDURE — 93306 TTE W/DOPPLER COMPLETE: CPT

## 2023-08-03 PROCEDURE — A9270 NON-COVERED ITEM OR SERVICE: HCPCS | Performed by: HOSPITALIST

## 2023-08-03 RX ADMIN — ATORVASTATIN CALCIUM 80 MG: 80 TABLET, FILM COATED ORAL at 17:20

## 2023-08-03 RX ADMIN — LAMOTRIGINE 150 MG: 100 TABLET ORAL at 17:20

## 2023-08-03 RX ADMIN — APIXABAN 5 MG: 5 TABLET, FILM COATED ORAL at 05:26

## 2023-08-03 RX ADMIN — LOSARTAN POTASSIUM 25 MG: 50 TABLET, FILM COATED ORAL at 05:27

## 2023-08-03 RX ADMIN — APIXABAN 5 MG: 5 TABLET, FILM COATED ORAL at 17:20

## 2023-08-03 ASSESSMENT — COGNITIVE AND FUNCTIONAL STATUS - GENERAL
TURNING FROM BACK TO SIDE WHILE IN FLAT BAD: UNABLE
MOBILITY SCORE: 8
MOVING FROM LYING ON BACK TO SITTING ON SIDE OF FLAT BED: UNABLE
WALKING IN HOSPITAL ROOM: A LOT
CLIMB 3 TO 5 STEPS WITH RAILING: TOTAL
STANDING UP FROM CHAIR USING ARMS: A LOT
MOVING TO AND FROM BED TO CHAIR: UNABLE
SUGGESTED CMS G CODE MODIFIER MOBILITY: CM

## 2023-08-03 ASSESSMENT — GAIT ASSESSMENTS
GAIT LEVEL OF ASSIST: MODERATE ASSIST
ASSISTIVE DEVICE: PARALLEL BARS
DISTANCE (FEET): 10

## 2023-08-03 ASSESSMENT — PAIN DESCRIPTION - PAIN TYPE
TYPE: ACUTE PAIN
TYPE: ACUTE PAIN

## 2023-08-03 ASSESSMENT — FIBROSIS 4 INDEX: FIB4 SCORE: 1.3

## 2023-08-03 NOTE — THERAPY
Speech Language Pathology   Cognitive Evaluation     Patient Name: Doreen Noe  AGE:  67 y.o., SEX:  female  Medical Record #: [id number]  Date of Service: 8/3/2023      History of Present Illness  67 y.o. female presented to [facility] 8/1/23 with right sided weakness.      PMHx: seizure disorder, previous stroke (2016), chronic tremor, and neuropathy     CMHx: stroke, seizure disorder, thrombocytopenia, hypocalcemia.      CT-CTA Head: 1. Occlusion of distal left pericallosal artery. 2.  Moderate size chronic area of infarction in right anterior frontal lobe.     CXR: Linear left basilar opacities  No pleural effusion. No pneumothorax.      General Information  O2 Delivery Device: None - Room Air  Level of Consciousness: Alert, Awake  Patient Behaviors:  (Cooperative, pleasant)  Orientation: Oriented x 4  Follows Directives: Yes      Prior Living Situation & Level of Function  Prior Services: None, Home-Independent  Lives with - Patient's Self Care Capacity: Alone and Able to Care For Self  Communication: WNL  Swallowing: WNL      Subjective  Pt alert, awake, agreeable to participate. Pt report no changes in cognition since recent hospital admission.       Communication Domain(s)  Expressive Language: WFL  Receptive Language: WFL  Cognitive-Linguistic: WFL  Social/Pragmatic: WFL      Assessment  The patient was seen this date for a cognitive-linguistic evaluation. The standardized assessment, Cognistat (The Neurobehavioral Cognitive status Examination) was conducted and the Pt's results are as follows:    Orientation: Average  Attention: Average  Comprehension: Average  Repetition: Average  Naming: Average  Memory: Average  Calculations: Moderate  Similarities: Mild  Judgement: Average    Non-standardized measures were also conducted to assess cognitive domains. Auditory comprehension: Pt answered simple yes/no questions: 2/2, moderate yes/no questions: 3/3, complex yes/no questions 3/3. Pt followed simple commands  "2/2, moderate commands: 3/3, and complex commands: 3/3. The medical management tool was administered and the Pt accurately demo' d dosage management and strategies to ensure prevention of missed dose. A language sample was conducted via picture sample: Pt demo' d functional descriptions, grammar, and complex sentence structure. Per Pt report, Pt independent with IADLS at home.       Clinical Impressions  Patient presents with functional cognitive-linguistic skills for discharge environment. Pt demo' d moderate deficits re: calculations, however, per Pt report, consistent with baseline and Pt utilizes calculator at home. Pt denied any changes or concerns re: cognition. Pt expressed she will be discharged to spouse's home who will be able to assist with any IADLS as needed. No acute speech therapy needs indicated at this time. Please re-consult SLP if any changes arise.       NOTE: It is not within the scope of practice of Speech-Language Pathologists to determine patient capacity. Please defer to the physician or psych to complete this assessment.       Recommendations  Supervision Needs Upons Discharge: None  IADLs: N/A       Anticipated Discharge Needs  Discharge Recommendations: Anticipate that the patient will have no further speech therapy needs after discharge from the hospital  Therapy Recommendations Upon DC: Patient / Family / Caregiver Education      Patient / Family Goals  Patient / Family Goal #1: \"I haven't eaten anything since Monday night\"  Short Term Goal # 1: Pt will consume SB6/TN0 diet with no overt s/sx aspiration  Goal Outcome # 1: Goal met, new goal added  Short Term Goal # 1 B : Pt will consume RG7/TN0 diet with no overt s/sx of aspiration      Milena Weeks, SLP  "

## 2023-08-03 NOTE — PROGRESS NOTES
Chief Complaint   Patient presents with    Extremity Weakness     Pt woke today at 07:15 with RUE and RLE weakness, LKW last night approx 23:00, RUE symptoms have improved on arrival and only has mild drift, pt is unable to move RLE, sensation intact.        Problem List Items Addressed This Visit    None  Visit Diagnoses       Cerebrovascular accident (CVA) due to occlusion of left anterior cerebral artery (HCC)        Relevant Medications    atorvastatin (Lipitor) tablet 80 mg    apixaban (Eliquis) tablet 5 mg    hydrALAZINE (Apresoline) injection 10 mg    losartan (Cozaar) tablet 25 mg    Other Relevant Orders    Referral to Physiatry (PMR)    Referral to Neurology            Neurology Stroke Progress Note      History of present illness:  This is a 67-year old female with PMHx significant for seizure disorder (reportedly nocturnal seizures) secondary to Right frontal stroke (2016; on Lamictal), dyslipidemia, further details limited who presented to [facility] on 8/1/23 for a chief complaint of Right LE > Right UE weakness. The patient states that she went to sleep in her usual state of health last night at 200; when she awoke at 0715, she had difficulty moving her RUE/RLE. She called EMS; on scene BG WNL, SBP 120s. She was reportedly profoundly weak to both RUE/RLE; en route symptoms to RUE improved; RLE remains nearly flaccid at time of arrival. Here, Stat CT head revealed no acute intracranial abnormality; did note encephalomalacia to Right frontal region consistent with chronic stroke. CTA head/neck with no obvious LVO nor hemodynamically significant stenosis; CT perfusion study with small perfusion mismatch well correlating to RLE weakness/Left CAROL ANN territory; unfortunately patient not a candidate for IV thrombolytics secondary to presentation time greater than 4.5 hours from time LKW; no IR intervention given no LVO. Currently patient is sitting up in bed; awake and alert; still with RLE>RUE  weakness. She denies numbness, problem with vision, speech or swallowing, headache or dizziness.     Neurology has been consulted by Dr. Bimal Ramon to further evaluate the findings noted above.     Interval, 8/2/23:  Patient is laying in bed; awake and alert, feels well, better today. Now with some antigravity movement to RLE; appx 3+/5. Awaiting PT/OT.     MRI Brain wo contrast has revealed small acute ischemic stroke involving Left CAROL ANN territory; invariably embolic-- suspect cardioembolic, TTE pending. Given secondary embolic appearing stroke feel it most prudent to initiate empiric anticoagulation, will start today/this evening.     Interval, 8/3/23:  Patient is sitting up in bed; awake and alert. Feels well, admits to continued improvement to RLE weakness today. PT/OT rec acute rehab. Started on Eliquis 5 mg PO BID yesterday given high suspicion for embolic process/cardioembolic; remains NSR on telemetry. TTE still pending; plan for Zio patch at time of discharge.     No changes to HPI as was previously documented.     Past medical history:   Past Medical History:   Diagnosis Date    Hyperlipidemia     Muscle disorder     Neuropathy (HCC)     from right shoulder injury    Seizure disorder (HCC)     10/25/2019     Stroke (HCC)     01/28/2019     Substance abuse (HCC)     Thrombocytopenia (HCC) 8/1/2023       Past surgical history:   Past Surgical History:   Procedure Laterality Date    DC COLONOSCOPY,DIAGNOSTIC N/A 9/28/2021    Procedure: COLONOSCOPY;  Surgeon: Nir Hemphill M.D.;  Location: SURGERY [facility];  Service: Gastroenterology    DC NEUROPLASTY & OR TRANSPOS MEDIAN NRV CARPAL KEVIN Left 1/21/2020    Procedure: Left carpal tunnel release.;  Surgeon: Nir Marin M.D.;  Location: SURGERY [facility];  Service: Orthopedics       Family history:   Family History   Problem Relation Age of Onset    Hypertension Mother     Dementia Mother     Heart Failure Mother     Heart Failure Father     Heart  Attack Father     Kidney Disease Father     Hypertension Father        Social history:   Social History     Socioeconomic History    Marital status:      Spouse name: Not on file    Number of children: Not on file    Years of education: Not on file    Highest education level: Not on file   Occupational History    Not on file   Tobacco Use    Smoking status: Never    Smokeless tobacco: Never   Vaping Use    Vaping Use: Never used   Substance and Sexual Activity    Alcohol use: Yes    Drug use: Yes     Types: Inhaled     Comment: Marijuana    Sexual activity: Not on file   Other Topics Concern    Not on file   Social History Narrative    Not on file     Social Determinants of Health     Financial Resource Strain: Not on file   Food Insecurity: Not on file   Transportation Needs: Not on file   Physical Activity: Not on file   Stress: Not on file   Social Connections: Not on file   Intimate Partner Violence: Not on file   Housing Stability: Not on file       Current medications:   Current Facility-Administered Medications   Medication Dose    apixaban (Eliquis) tablet 5 mg  5 mg    hydrALAZINE (Apresoline) injection 10 mg  10 mg    losartan (Cozaar) tablet 25 mg  25 mg    atorvastatin (Lipitor) tablet 80 mg  80 mg    NS (Bolus) infusion 500 mL  500 mL    lamoTRIgine (LaMICtal) tablet 150 mg  150 mg       Medication Allergy:  No Known Allergies    Review of systems:   Constitutional: denies fever, night sweats, weight loss.   Eyes: denies acute vision change, eye pain or secretion.   Ears, Nose, Mouth, Throat: denies nasal secretion, nasal bleeding, difficulty swallowing, hearing loss, tinnitus, vertigo, ear pain, acute dental problems, oral ulcers or lesions.   Endocrine: denies recent weight changes, heat or cold intolerance, polyuria, polydypsia, polyphagia,abnormal hair growth.  Cardiovascular: denies new onset of chest pain, palpitations, syncope, or dyspnea of exertion.  Pulmonary: denies shortness of  breath, new onset of cough, hemoptysis, wheezing, chest pain or flu-like symptoms.   GI: denies nausea, vomiting, diarrhea, GI bleeding, change in appetite, abdominal pain, and change in bowel habits.  : denies dysuria, urinary incontinence, hematuria.  Heme/oncology: denies history of easy bruising or bleeding. No history of cancer, DVTor PE.  Allergy/immunology: denies hives/urticaria, or itching.   Dermatologic: denies new rash, or new skin lesions.  Musculoskeletal:denies joint swelling or pain, muscle pain, neck and back pain.   Neurologic: As noted in detail above.   Psychiatric: denies symptoms of depression, anxiety, hallucinations, mood swings or changes, suicidal or homicidal thoughts.     Physical examination:   Vitals:    08/03/23 0013 08/03/23 0346 08/03/23 0527 08/03/23 0715   BP: (!) 149/79 (!) 147/88 (!) 146/89 (!) 149/88   Pulse: 62 74  66   Resp: 16 16  18   Temp: 36.6 °C (97.9 °F) 36.6 °C (97.9 °F)  36.6 °C (97.9 °F)   TempSrc: Temporal Temporal  Temporal   SpO2: 93% 92%  93%   Weight:       Height:         General: Patient in no acute distress.  HEENT: Normocephalic, no signs of acute trauma.   Neck: supple, no meningeal signs or carotid bruits. There is normal range of motion. No tenderness on exam.   Chest: clear to auscultation. No cough.   CV: RRR, no murmurs.   Skin: no signs of acute rashes or trauma.   Musculoskeletal: joints exhibit full range of motion, without any pain to palpation. There are no signs of joint or muscle swelling. There is no tenderness to deep palpation of muscles.   Psychiatric: No hallucinatory behavior.       NEUROLOGICAL EXAM:   Mental status, orientation: Awake, alert and fully oriented.   Speech and language: speech is clear and fluent. The patient is able to name, repeat and comprehend.    Cranial nerve exam: Pupils are 3-4 mm bilaterally and equally reactive to light. Visual fields are intact by confrontation. There is no nystagmus on primary or secondary  gaze. Intact full EOM in all directions of gaze.  Face appears symmetric. Sensation in the face is intact to light touch. Uvula is midline. Palate elevates symmetrically. Tongue is midline and without any signs of tongue biting or fasciculations. Shoulder shrug is intact bilaterally.   Motor exam: Strength is 5/5 in LUE/LLE; 4+/5 to RUE with slight drift; 3+/5 to RLE, antigravity. Tone is normal. No abnormal movements were seen on exam.   Sensory exam reveals normal sense of light touch and pinprick in all extremities.   Deep tendon reflexes:  Plantar responses are flexor. There is no clonus.   Coordination: shows a normal finger-nose-finger.   Gait:Not assessed at this time as patient is a fall risk.       No significant changes to exam as was documented on 8/2/23.       NIH Stroke Scale    1a Level of Consciousness   1b Orientation Questions   1c Response to Commands   2 Gaze   3 Visual Fields   4 Facial Movement   5 Motor Function (arm)   a Left   b Right 1  6 Motor Function (leg)   a Left   b Right 2  7 Limb Ataxia   8 Sensory   9 Language   10 Articulation   11 Extinction/Inattention     Score: 3        ANCILLARY DATA REVIEWED:     Lab Data Review:  Recent Results (from the past 24 hour(s))   CBC WITH DIFFERENTIAL    Collection Time: 08/03/23  6:23 AM   Result Value Ref Range    WBC 6.6 4.8 - 10.8 K/uL    RBC 5.36 4.20 - 5.40 M/uL    Hemoglobin 16.1 (H) 12.0 - 16.0 g/dL    Hematocrit 47.8 (H) 37.0 - 47.0 %    MCV 89.2 81.4 - 97.8 fL    MCH 30.0 27.0 - 33.0 pg    MCHC 33.7 32.2 - 35.5 g/dL    RDW 41.1 35.9 - 50.0 fL    Platelet Count 189 164 - 446 K/uL    MPV 9.9 9.0 - 12.9 fL    Neutrophils-Polys 61.60 44.00 - 72.00 %    Lymphocytes 26.70 22.00 - 41.00 %    Monocytes 8.60 0.00 - 13.40 %    Eosinophils 2.00 0.00 - 6.90 %    Basophils 0.50 0.00 - 1.80 %    Immature Granulocytes 0.60 0.00 - 0.90 %    Nucleated RBC 0.00 0.00 - 0.20 /100 WBC    Neutrophils (Absolute) 4.08 1.82 - 7.42 K/uL    Lymphs (Absolute) 1.77  1.00 - 4.80 K/uL    Monos (Absolute) 0.57 0.00 - 0.85 K/uL    Eos (Absolute) 0.13 0.00 - 0.51 K/uL    Baso (Absolute) 0.03 0.00 - 0.12 K/uL    Immature Granulocytes (abs) 0.04 0.00 - 0.11 K/uL    NRBC (Absolute) 0.00 K/uL       Labs reviewed by me.         Imaging reviewed by me:     CT-HEAD W/O   Final Result      1.  No acute intracranial hemorrhage. No mass.   2.  New since recent prior loss of gray-white matter differentiation in the left frontal lobe near the vertex, consistent with a small subacute infarct. No hemorrhagic transformation.   3.  Chronic encephalomalacia in the right frontal lobe.         MR-BRAIN-W/O   Final Result         Acute infarct in the left medial frontal region in the left posterior CAROL ANN distribution.      Remote right frontal infarct with encephalomalacia.      Age-related volume loss.      DX-CHEST-PORTABLE (1 VIEW)   Final Result         Linear left basilar atelectasis      CT-CTA NECK WITH & W/O-POST PROCESSING   Final Result      CT angiogram of the neck within normal limits.      CT-CTA HEAD WITH & W/O-POST PROCESS   Final Result      1.  Occlusion of distal left pericallosal artery.      2.  Moderate size chronic area of infarction in right anterior frontal lobe.      3.  Case discussed with Dr. Ahmadi at 9:35 AM 8/1/2023.      CT-CEREBRAL PERFUSION ANALYSIS   Final Result      1.  Cerebral blood flow less than 30% likely representing completed infarct = 0 mL.      2.  T Max more than 6 seconds likely representing combination of completed infarct and ischemia = 3 mL.      3.  Mismatched volume likely representing ischemic brain/penumbra = 3      4.  Please note that the cerebral perfusion was performed on the limited brain tissue around the basal ganglia region. Infarct/ischemia outside the CT perfusion sections can be missed in this study.      CT-HEAD W/O   Final Result      1.  Cerebral atrophy.      2.  White matter lucencies most consistent with small vessel ischemic change  versus demyelination or gliosis.      3. Moderate wedge-shaped area of chronic infarction in the right anterior frontal lobe.         EC-ECHOCARDIOGRAM COMPLETE W/O CONT    (Results Pending)         Presumed mechanism by TOAST:  __Large Artery Atherosclerosis  __Small Vessel (Lacunar)  _X_Cardioembolic  __Other (Sickle Cell, Vasculitis, Hypercoagulable)  __Unknown    Modified Louisa Scale (MRS): 1 = No significant disability, despite symptoms; able to perform all usual duties and activities      ASSESSMENT AND PLAN:  67-year old female with PMHx significant for seizure disorder (reportedly nocturnal seizures) secondary to Right frontal stroke (2016; on Lamictal), dyslipidemia, further details limited who presented to [facility] on 8/1/23 for a chief complaint of Right LE > Right UE weakness; LKW 2300/bed time 7/31. Here, CT head revealed no acute intracranial abnormality; did note encephalomalacia to Right frontal region consistent with chronic stroke. CTA head/neck with no obvious LVO nor hemodynamically significant stenosis; CT perfusion study with small perfusion mismatch well correlating to RLE weakness/Left CAROL ANN territory; unfortunately patient not a candidate for IV thrombolytics secondary to presentation time greater than 4.5 hours from time LKW; no IR intervention given no LVO.   MRI Brain wo contrast has revealed acute ischemic stroke involving Left CAROL ANN territory; invariably embolic-- suspect cardioembolic, TTE still pending. NSR on tele. Given secondary embolic appearing stroke patient started on Eliquis; counseled patient regarding importance of compliance and she voices her understanding.     Recommendations/Plan:    -q4h and PRN neuro assessment. VS per nursing/unit protocol. BP goal is normotension, 100-130/60-80. Antihypertensives per primary team.   -Telemetry; currently SR. Screen for Afib/arrhythmia. Obtain TTE-- still pending. Likely plan for Zio patch at time of discharge.  -Continue  Eliquis 5 mg PO BID.  -Atorvastatin 80 mg PO q HS. Note LDL is 146, goal < 70.   -Recommend aggressive BG management per primary team. Avoid IVF with Dextrose. BG goal 140-180. hemoglobin A1c is 5.7.   -PT/OT/SLP eval and treat. Physiatry consult.   -Counseled patient at length regarding life style and risk factor modification for secondary stroke prevention, including importance of medication compliance.   -Continue home dose Lamictal for seizure ppx.   -Plan for outpatient neurology follow up (stroke bridge clinic) within 2-4 weeks of discharge.   -All other medical management per primary team.   -DVT PPX: SCDs.      The plan of care above has been discussed with Dr. Russ Laura. Will follow up with TTE; otherwise no further recommendations from a neurological perspective. Please call with questions.     JAYLA Martinez.  [facility]

## 2023-08-03 NOTE — PROGRESS NOTES
Monitor Summary: SB-SR 69-91, JR 50-59, ID -0.14, QRS -0.09, QT -0.40, with occasional pvc/trig, rare coup, rare pvc per strip from the monitor room.

## 2023-08-03 NOTE — PROGRESS NOTES
Hospital Medicine Daily Progress Note    Date of Service  8/3/2023    Chief Complaint  Doreen Noe is a 67 y.o. female admitted 8/1/2023 with right-sided weakness    Hospital Course  67-year-old male with a past medical history of seizure disorder, prior stroke, chronic tremors, neuropathy who presented with acute onset of right-sided weakness.  Stat CT head revealed no acute intracranial abnormality.  Did reveal encephalomalacia to right frontal region consistent with prior stroke.  CTA head and neck was negative for large vessel occlusion.  Patient was not a candidate for tPA since her presentation was greater than 4.5 hours.    Interval Problem Update  Patient seen and evaluated bedside with family.  She noticed some improvement of her strength on her right side.  Awaiting PT OT ST evaluation.  MRI brain reveals small acute ischemic stroke involving the left CAROL ANN territory.  Suspect this may be cardioembolic, patient has been started on Eliquis.  Continuous cardiac monitoring to evaluate for arrhythmias.  2D echo is pending.  Case discussed with neurology.    I have discussed this patient's plan of care and discharge plan at IDT rounds today with Case Management, Nursing, Nursing leadership, and other members of the IDT team.    Consultants/Specialty  neurology    Code Status  Prior    Disposition  The patient is not medically cleared for discharge to home or a post-acute facility.  Anticipate discharge to: an inpatient rehabilitation hospital    I have placed the appropriate orders for post-discharge needs.    Review of Systems  ROS     Physical Exam  Temp:  [36.6 °C (97.9 °F)-36.7 °C (98.1 °F)] 36.6 °C (97.9 °F)  Pulse:  [62-82] 82  Resp:  [16-18] 18  BP: (140-154)/(78-89) 140/85  SpO2:  [92 %-94 %] 92 %    Physical Exam  Vitals and nursing note reviewed.   Constitutional:       General: She is not in acute distress.     Appearance: Normal appearance.   HENT:      Head: Normocephalic and atraumatic.       Nose: Nose normal.      Mouth/Throat:      Mouth: Mucous membranes are moist.   Eyes:      Extraocular Movements: Extraocular movements intact.      Conjunctiva/sclera: Conjunctivae normal.      Pupils: Pupils are equal, round, and reactive to light.   Cardiovascular:      Rate and Rhythm: Normal rate and regular rhythm.      Pulses: Normal pulses.      Heart sounds: Normal heart sounds.   Pulmonary:      Effort: Pulmonary effort is normal. No respiratory distress.      Breath sounds: Normal breath sounds. No wheezing, rhonchi or rales.   Abdominal:      General: Bowel sounds are normal. There is no distension.      Palpations: Abdomen is soft.      Tenderness: There is no abdominal tenderness.   Musculoskeletal:         General: No swelling or tenderness. Normal range of motion.      Cervical back: Normal range of motion and neck supple.   Lymphadenopathy:      Cervical: No cervical adenopathy.   Skin:     General: Skin is warm.      Coloration: Skin is not jaundiced.      Findings: No rash.   Neurological:      General: No focal deficit present.      Mental Status: She is alert and oriented to person, place, and time.      Cranial Nerves: No cranial nerve deficit.      Motor: No weakness.   Psychiatric:         Mood and Affect: Mood normal.         Behavior: Behavior normal.         Fluids    Intake/Output Summary (Last 24 hours) at 8/3/2023 1557  Last data filed at 8/3/2023 1400  Gross per 24 hour   Intake 150 ml   Output 850 ml   Net -700 ml       Laboratory  Recent Labs     08/01/23  0855 08/02/23  0341 08/03/23  0623   WBC 8.8 7.2 6.6   RBC 4.92 5.12 5.36   HEMOGLOBIN 14.8 15.2 16.1*   HEMATOCRIT 44.7 46.0 47.8*   MCV 90.9 89.8 89.2   MCH 30.1 29.7 30.0   MCHC 33.1 33.0 33.7   RDW 41.9 41.4 41.1   PLATELETCT 156* 168 189   MPV 10.1 9.9 9.9     Recent Labs     08/01/23  0855 08/02/23  0341   SODIUM 145 141   POTASSIUM 3.6 3.8   CHLORIDE 111 107   CO2 24 21   GLUCOSE 143* 100*   BUN 15 12   CREATININE 0.64 0.62    CALCIUM 8.2* 9.3     Recent Labs     08/01/23  0855   APTT 23.4*   INR 1.04         Recent Labs     08/02/23  0341   TRIGLYCERIDE 137   HDL 41   *       Imaging  CT-HEAD W/O   Final Result      1.  No acute intracranial hemorrhage. No mass.   2.  New since recent prior loss of gray-white matter differentiation in the left frontal lobe near the vertex, consistent with a small subacute infarct. No hemorrhagic transformation.   3.  Chronic encephalomalacia in the right frontal lobe.         MR-BRAIN-W/O   Final Result         Acute infarct in the left medial frontal region in the left posterior CAROL ANN distribution.      Remote right frontal infarct with encephalomalacia.      Age-related volume loss.      DX-CHEST-PORTABLE (1 VIEW)   Final Result         Linear left basilar atelectasis      CT-CTA NECK WITH & W/O-POST PROCESSING   Final Result      CT angiogram of the neck within normal limits.      CT-CTA HEAD WITH & W/O-POST PROCESS   Final Result      1.  Occlusion of distal left pericallosal artery.      2.  Moderate size chronic area of infarction in right anterior frontal lobe.      3.  Case discussed with Dr. Ahmadi at 9:35 AM 8/1/2023.      CT-CEREBRAL PERFUSION ANALYSIS   Final Result      1.  Cerebral blood flow less than 30% likely representing completed infarct = 0 mL.      2.  T Max more than 6 seconds likely representing combination of completed infarct and ischemia = 3 mL.      3.  Mismatched volume likely representing ischemic brain/penumbra = 3      4.  Please note that the cerebral perfusion was performed on the limited brain tissue around the basal ganglia region. Infarct/ischemia outside the CT perfusion sections can be missed in this study.      CT-HEAD W/O   Final Result      1.  Cerebral atrophy.      2.  White matter lucencies most consistent with small vessel ischemic change versus demyelination or gliosis.      3. Moderate wedge-shaped area of chronic infarction in the right anterior  frontal lobe.         EC-ECHOCARDIOGRAM COMPLETE W/O CONT    (Results Pending)        Assessment/Plan  * Stroke determined by clinical assessment ([facility])- (present on admission)  Assessment & Plan  Acute right sided weakness  improving  Neurology following  Started on Eliquis  Started on Lipitor 80  Continuous cardiac monitoring and 2D echo  Mri brain reveals acute infarct in left medial frontal region in the left posterior CAROL ANN distribution  Serial neuro exams  Speech, PT and OT to eval         Hypocalcemia  Assessment & Plan  Mild  Will replace with tums  Will repeat in am    Seizure disorder ([facility])- (present on admission)  Assessment & Plan  History of  Seizure precautions  Following  Continue lamictal         VTE prophylaxis:    therapeutic anticoagulation with eliquis 5 mg BID      I have performed a physical exam and reviewed and updated ROS and Plan today (8/3/2023). In review of yesterday's note (8/2/2023), there are no changes except as documented above.    I spent 55 minutes, reviewing the chart, obtaining and/or reviewing separately obtained history. Performing a medically appropriate examination and evaluation.  Counseling and educating the patient. Ordering and reviewing medications, tests, or procedures.  Discussing the case with neurology.  Documenting clinical information in EPIC. Independently interpreting results and communicating results to patient and family. Discussing future disposition of care with patient, RN and case management.

## 2023-08-03 NOTE — CARE PLAN
The patient is Stable - Low risk of patient condition declining or worsening    Shift Goals  Clinical Goals: Monitor neuro status  Patient Goals: Rest  Family Goals: [place]    Progress made toward(s) clinical / shift goals: Assumed care of patient at 1915. Patient is a+O x4 and has weakness on her right side. Q4 neuro checks in place and NIH assessment performed. Patient has numbness in the right extremities but no longer on the right side of her face.Call light within reach and bed alarm on.     Problem: Neuro Status  Goal: Neuro status will remain stable or improve  Outcome: Progressing  Note: Q4 hour neuro checks in place and NIH assessment performed. Patient is A+O x4 with right sided weakness. Patient's neuro checks and NIH exam remained stable.      Problem: Risk for Aspiration  Goal: Patient's risk for aspiration will be absent or decrease  Outcome: Progressing  Note: Patient is on a level VI soft and bite sized diet and thin liquid diet. Patient is able to swallow without any signs of aspiration. Head of bed 30 degrees or more during feeding.      Problem: Urinary Elimination  Goal: Establish and maintain regular urinary output  Outcome: Progressing  Note: Patient has a female wick in place for incontinence. Patient voids regularly and urine is clear and yellow.      Problem: Fall Risk  Goal: Patient will remain free from falls  Outcome: Progressing  Note: Patient was assessed to be a high fall risk. Bed in low position, locked, and bed alarm on. Call light within reach.        Patient is not progressing towards the following goals: N/A

## 2023-08-03 NOTE — PROGRESS NOTES
Monitor Summary: SR/JR 59-82, WA 0.19, QRS 0.10, QT 0.49, with rare PVC's/PAC's, bigeminy, trigeminy, couplets, I/O JR per strip from monitor room.

## 2023-08-03 NOTE — PROGRESS NOTES
Hospital Medicine Daily Progress Note    Date of Service  8/3/2023    Chief Complaint  Doreen Noe is a 67 y.o. female admitted 8/1/2023 with right-sided weakness    Hospital Course  67-year-old male with a past medical history of seizure disorder, prior stroke, chronic tremors, neuropathy who presented with acute onset of right-sided weakness.  Stat CT head revealed no acute intracranial abnormality.  Did reveal encephalomalacia to right frontal region consistent with prior stroke.  CTA head and neck was negative for large vessel occlusion.  Patient was not a candidate for tPA since her presentation was greater than 4.5 hours.    Interval Problem Update  Patient seen and evaluated bedside with family.  She reports improvement of her right-sided weakness.  Speech therapy recommends soft bite-size solids and thins.  PT OT recommends postacute placement at acute rehab.  Physiatry consult has been placed.  2D echo pending    I have discussed this patient's plan of care and discharge plan at IDT rounds today with Case Management, Nursing, Nursing leadership, and other members of the IDT team.    Consultants/Specialty  neurology    Code Status  Prior    Disposition  The patient is not medically cleared for discharge to home or a post-acute facility.  Anticipate discharge to: an inpatient rehabilitation hospital    I have placed the appropriate orders for post-discharge needs.    Review of Systems  ROS     Physical Exam  Temp:  [36.6 °C (97.9 °F)-36.7 °C (98.1 °F)] 36.6 °C (97.9 °F)  Pulse:  [62-82] 82  Resp:  [16-18] 18  BP: (140-154)/(78-89) 140/85  SpO2:  [92 %-94 %] 92 %    Physical Exam  Vitals and nursing note reviewed.   Constitutional:       General: She is not in acute distress.     Appearance: Normal appearance.   HENT:      Head: Normocephalic and atraumatic.      Nose: Nose normal.      Mouth/Throat:      Mouth: Mucous membranes are moist.   Eyes:      Extraocular Movements: Extraocular movements intact.       Conjunctiva/sclera: Conjunctivae normal.      Pupils: Pupils are equal, round, and reactive to light.   Cardiovascular:      Rate and Rhythm: Normal rate and regular rhythm.      Pulses: Normal pulses.      Heart sounds: Normal heart sounds.   Pulmonary:      Effort: Pulmonary effort is normal. No respiratory distress.      Breath sounds: Normal breath sounds. No wheezing, rhonchi or rales.   Abdominal:      General: Bowel sounds are normal. There is no distension.      Palpations: Abdomen is soft.      Tenderness: There is no abdominal tenderness.   Musculoskeletal:         General: No swelling or tenderness. Normal range of motion.      Cervical back: Normal range of motion and neck supple.   Lymphadenopathy:      Cervical: No cervical adenopathy.   Skin:     General: Skin is warm.      Coloration: Skin is not jaundiced.      Findings: No rash.   Neurological:      General: No focal deficit present.      Mental Status: She is alert and oriented to person, place, and time.      Cranial Nerves: No cranial nerve deficit.      Motor: No weakness.   Psychiatric:         Mood and Affect: Mood normal.         Behavior: Behavior normal.         Fluids    Intake/Output Summary (Last 24 hours) at 8/3/2023 1604  Last data filed at 8/3/2023 1400  Gross per 24 hour   Intake 150 ml   Output 850 ml   Net -700 ml         Laboratory  Recent Labs     08/01/23  0855 08/02/23  0341 08/03/23  0623   WBC 8.8 7.2 6.6   RBC 4.92 5.12 5.36   HEMOGLOBIN 14.8 15.2 16.1*   HEMATOCRIT 44.7 46.0 47.8*   MCV 90.9 89.8 89.2   MCH 30.1 29.7 30.0   MCHC 33.1 33.0 33.7   RDW 41.9 41.4 41.1   PLATELETCT 156* 168 189   MPV 10.1 9.9 9.9       Recent Labs     08/01/23  0855 08/02/23  0341   SODIUM 145 141   POTASSIUM 3.6 3.8   CHLORIDE 111 107   CO2 24 21   GLUCOSE 143* 100*   BUN 15 12   CREATININE 0.64 0.62   CALCIUM 8.2* 9.3       Recent Labs     08/01/23  0855   APTT 23.4*   INR 1.04           Recent Labs     08/02/23  0341   TRIGLYCERIDE 137    HDL 41   *         Imaging  CT-HEAD W/O   Final Result      1.  No acute intracranial hemorrhage. No mass.   2.  New since recent prior loss of gray-white matter differentiation in the left frontal lobe near the vertex, consistent with a small subacute infarct. No hemorrhagic transformation.   3.  Chronic encephalomalacia in the right frontal lobe.         MR-BRAIN-W/O   Final Result         Acute infarct in the left medial frontal region in the left posterior CAROL ANN distribution.      Remote right frontal infarct with encephalomalacia.      Age-related volume loss.      DX-CHEST-PORTABLE (1 VIEW)   Final Result         Linear left basilar atelectasis      CT-CTA NECK WITH & W/O-POST PROCESSING   Final Result      CT angiogram of the neck within normal limits.      CT-CTA HEAD WITH & W/O-POST PROCESS   Final Result      1.  Occlusion of distal left pericallosal artery.      2.  Moderate size chronic area of infarction in right anterior frontal lobe.      3.  Case discussed with Dr. Ahmadi at 9:35 AM 8/1/2023.      CT-CEREBRAL PERFUSION ANALYSIS   Final Result      1.  Cerebral blood flow less than 30% likely representing completed infarct = 0 mL.      2.  T Max more than 6 seconds likely representing combination of completed infarct and ischemia = 3 mL.      3.  Mismatched volume likely representing ischemic brain/penumbra = 3      4.  Please note that the cerebral perfusion was performed on the limited brain tissue around the basal ganglia region. Infarct/ischemia outside the CT perfusion sections can be missed in this study.      CT-HEAD W/O   Final Result      1.  Cerebral atrophy.      2.  White matter lucencies most consistent with small vessel ischemic change versus demyelination or gliosis.      3. Moderate wedge-shaped area of chronic infarction in the right anterior frontal lobe.         EC-ECHOCARDIOGRAM COMPLETE W/O CONT    (Results Pending)          Assessment/Plan  * Stroke determined by  clinical assessment (HCC)- (present on admission)  Assessment & Plan  Acute right sided weakness  improving  Neurology following  Started on Eliquis  Started on Lipitor 80  Continuous cardiac monitoring and 2D echo  Mri brain reveals acute infarct in left medial frontal region in the left posterior CAROL ANN distribution  Serial neuro exams  Speech, PT and OT to eval         Hypocalcemia  Assessment & Plan  Mild  Will replace with tums  Will repeat in am    Seizure disorder (HCC)- (present on admission)  Assessment & Plan  History of  Seizure precautions  Following  Continue lamictal         VTE prophylaxis:    therapeutic anticoagulation with eliquis 5 mg BID      I have performed a physical exam and reviewed and updated ROS and Plan today (8/3/2023). In review of yesterday's note (8/2/2023), there are no changes except as documented above.

## 2023-08-03 NOTE — DISCHARGE PLANNING
Submitted to insurance.    Apixaban/Eliquis increases your risk for bleeding. Notify your doctor if you experience any of the following side effects: bleeding, coughing or vomiting blood, red or black stool, unexpected pain or swelling, itching or hives, chest pain, chest tightness, trouble breathing, changes in how much or how often you urinate, red or pink urine, numbness or tingling in your feet, or unusual muscle weakness. When Apixaban/Eliquis is taken with other medicines, they can affect how it works. Taking other medications such as aspirin, blood thinners, nonsteroidal anti-inflammatories, and medications that treat depression can increase your risk of bleeding. It is very important to tell your health care provider about all of the other medicines, including over-the-counter medications, herbs, and vitamins you are taking. DO NOT start, stop, or change the dosage of any medicine, including over-the-counter medicines, vitamins, and herbal products without your doctor’s approval. Any products containing aspirin or are nonsteroidal anti-inflammatories lessen the blood’s ability to form clots and add to the effect of Apixaban/Eliquis. Never take aspirin or medicines that contain aspirin without speaking to your doctor.

## 2023-08-03 NOTE — DISCHARGE PLANNING
Patient medically cleared for DC & pending insurance auth for [facility].  CM updated patient & family on DCP.      **1545 Hrs - At family's request, CM called [facility] (#[phone]) to inquire about room availability from 8/4/2023-8/25/2023; Spoke to Tem who confirmed availability for above dates & quoted discounted rate of $109.36 nightly.   Info communicated to patient's children who will f/u.

## 2023-08-03 NOTE — CARE PLAN
The patient is Stable - Low risk of patient condition declining or worsening    Shift Goals  Clinical Goals: Monitor neuro status  Patient Goals: Rest  Family Goals: [place]ER[place]    Progress made toward(s) clinical / shift goals:  Pt AAOx4 throughout shift, continues to have right sided weakness. Up to commode with x2 assist. POC discussed with pt and family at bedside. ECHO pending. Fall, aspiration and seizure precautions in place.     Patient is not progressing towards the following goals:

## 2023-08-03 NOTE — THERAPY
Speech Language Pathology   Daily Treatment     Patient Name: Doreen Noe  AGE:  67 y.o., SEX:  female  Medical Record #: [id number]  Date of Service: 8/3/2023      Precautions:  Precautions: Fall Risk       Subjective  Patient awake, alert, in bed, A&Ox4. Pt required assistance moving up in bed due to right lower extremity weakness. Pt reported tolerating meal trays well, no concerns re: swallow.       Assessment  Patient seen this date for dysphagia management and to check diet tolerance. Presented Pt with thin liquids (via straw), soft/bite sized and regular solids. Pt demo'd adequate oral acceptance, labial assimilation to feeding tools, and oral containment. Presumed complete AP transfer. Complete oral clearance, no residue noted. No overt s/sx airway invasion. Intermittent throat clear prior to PO trials and following, consistent with baseline per Pt report. Pt denies any history of GERD/reflux. Per Pt able to self-feed with assistance of meal tray set up. Pt denied any concerns re: eating and swallowing. Educated Pt to discontinue oral intake if any concerns or difficulties present and to make RN aware, Pt stated understanding.       Clinical Impressions  Patient presents with functional oropharyngeal swallow for recommended diet regular solids/thin liquids. No overt s/sx of aspiration. No diagnostic swallow evaluation indicated at this time. No acute speech therapy needs at this junction. Please re-consult SLP if any concerns or difficulties arise.       Recommendations  Diet Consistency: regular solids/thin liquids  Instrumentation: None indicated at this time  Medication: As tolerated  Supervision: Encourage self-feeding, Assist with meal tray set up  Positioning: Fully upright and midline during oral intake  Risk Management : Slow rate of intake  Oral Care: BID      Anticipated Discharge Needs  Discharge Recommendations: Anticipate that the patient will have no further therapy needs after discharge  "from the hospital  Therapy Recommendations Upon DC: Patient / Family / Caregiver Education      Patient / Family Goals  Patient / Family Goal #1: \"I haven't eaten anything since Monday night\"  Short Term Goals  Short Term Goal # 1: Pt will consume SB6/TN0 diet with no overt s/sx aspiration  Goal Outcome # 1: Goal met, new goal added  Short Term Goal # 1 B : Pt will consume RG7/TN0 diet with no overt s/sx of aspiration      Milena Weeks, SLP  "

## 2023-08-03 NOTE — DISCHARGE PLANNING
Received Choice Form @: 1649 8/2/23   Agency/ Facility Name: [facility]   Referral Sent per Choice Form @: 836 8/3/23      Received Choice Form @: 1647 8/2/23  Agency/ Facility Name: [facility]  Referral Sent per Choice Form @: 836 8/3/23

## 2023-08-04 ENCOUNTER — NON-PROVIDER VISIT (OUTPATIENT)
Dept: CARDIOLOGY | Facility: MEDICAL CENTER | Age: 68
End: 2023-08-04
Payer: COMMERCIAL

## 2023-08-04 ENCOUNTER — HOSPITAL ENCOUNTER (INPATIENT)
Facility: REHABILITATION | Age: 68
LOS: 18 days | DRG: 057 | End: 2023-08-22
Attending: PHYSICAL MEDICINE & REHABILITATION | Admitting: PHYSICAL MEDICINE & REHABILITATION
Payer: COMMERCIAL

## 2023-08-04 VITALS
TEMPERATURE: 97.5 F | WEIGHT: 168.87 LBS | BODY MASS INDEX: 26.51 KG/M2 | HEIGHT: 67 IN | RESPIRATION RATE: 16 BRPM | DIASTOLIC BLOOD PRESSURE: 65 MMHG | HEART RATE: 96 BPM | SYSTOLIC BLOOD PRESSURE: 123 MMHG | OXYGEN SATURATION: 96 %

## 2023-08-04 DIAGNOSIS — I47.19 NARROW COMPLEX TACHYCARDIA (HCC): ICD-10-CM

## 2023-08-04 DIAGNOSIS — I49.3 PVCS (PREMATURE VENTRICULAR CONTRACTIONS): ICD-10-CM

## 2023-08-04 DIAGNOSIS — I47.20 VENTRICULAR TACHYCARDIA (HCC): ICD-10-CM

## 2023-08-04 DIAGNOSIS — R00.0 WIDE-COMPLEX TACHYCARDIA: ICD-10-CM

## 2023-08-04 DIAGNOSIS — I63.9 ACUTE CVA (CEREBROVASCULAR ACCIDENT) (HCC): ICD-10-CM

## 2023-08-04 DIAGNOSIS — I47.19 PAT (PAROXYSMAL ATRIAL TACHYCARDIA) (HCC): ICD-10-CM

## 2023-08-04 PROBLEM — I51.89 GRADE I DIASTOLIC DYSFUNCTION: Status: ACTIVE | Noted: 2023-08-04

## 2023-08-04 PROBLEM — G62.9 NEUROPATHY: Status: ACTIVE | Noted: 2023-08-04

## 2023-08-04 PROBLEM — I10 HTN (HYPERTENSION): Status: ACTIVE | Noted: 2023-08-04

## 2023-08-04 PROBLEM — R25.1 TREMOR: Status: ACTIVE | Noted: 2023-08-04

## 2023-08-04 PROBLEM — E83.51 HYPOCALCEMIA: Status: RESOLVED | Noted: 2023-08-01 | Resolved: 2023-08-04

## 2023-08-04 LAB
BASOPHILS # BLD AUTO: 0.6 % (ref 0–1.8)
BASOPHILS # BLD: 0.05 K/UL (ref 0–0.12)
EOSINOPHIL # BLD AUTO: 0.17 K/UL (ref 0–0.51)
EOSINOPHIL NFR BLD: 2.1 % (ref 0–6.9)
ERYTHROCYTE [DISTWIDTH] IN BLOOD BY AUTOMATED COUNT: 41.7 FL (ref 35.9–50)
HCT VFR BLD AUTO: 49.9 % (ref 37–47)
HGB BLD-MCNC: 16.7 G/DL (ref 12–16)
IMM GRANULOCYTES # BLD AUTO: 0.04 K/UL (ref 0–0.11)
IMM GRANULOCYTES NFR BLD AUTO: 0.5 % (ref 0–0.9)
LYMPHOCYTES # BLD AUTO: 2.39 K/UL (ref 1–4.8)
LYMPHOCYTES NFR BLD: 29.8 % (ref 22–41)
MCH RBC QN AUTO: 30 PG (ref 27–33)
MCHC RBC AUTO-ENTMCNC: 33.5 G/DL (ref 32.2–35.5)
MCV RBC AUTO: 89.7 FL (ref 81.4–97.8)
MONOCYTES # BLD AUTO: 0.75 K/UL (ref 0–0.85)
MONOCYTES NFR BLD AUTO: 9.3 % (ref 0–13.4)
NEUTROPHILS # BLD AUTO: 4.63 K/UL (ref 1.82–7.42)
NEUTROPHILS NFR BLD: 57.7 % (ref 44–72)
NRBC # BLD AUTO: 0 K/UL
NRBC BLD-RTO: 0 /100 WBC (ref 0–0.2)
PLATELET # BLD AUTO: 199 K/UL (ref 164–446)
PMV BLD AUTO: 10 FL (ref 9–12.9)
RBC # BLD AUTO: 5.56 M/UL (ref 4.2–5.4)
WBC # BLD AUTO: 8 K/UL (ref 4.8–10.8)

## 2023-08-04 PROCEDURE — A9270 NON-COVERED ITEM OR SERVICE: HCPCS | Performed by: NURSE PRACTITIONER

## 2023-08-04 PROCEDURE — A9270 NON-COVERED ITEM OR SERVICE: HCPCS | Performed by: PHYSICAL MEDICINE & REHABILITATION

## 2023-08-04 PROCEDURE — 36415 COLL VENOUS BLD VENIPUNCTURE: CPT

## 2023-08-04 PROCEDURE — 99239 HOSP IP/OBS DSCHRG MGMT >30: CPT | Performed by: INTERNAL MEDICINE

## 2023-08-04 PROCEDURE — 94760 N-INVAS EAR/PLS OXIMETRY 1: CPT

## 2023-08-04 PROCEDURE — 770010 HCHG ROOM/CARE - REHAB SEMI PRIVAT*

## 2023-08-04 PROCEDURE — 99223 1ST HOSP IP/OBS HIGH 75: CPT | Performed by: PHYSICAL MEDICINE & REHABILITATION

## 2023-08-04 PROCEDURE — 700105 HCHG RX REV CODE 258: Performed by: INTERNAL MEDICINE

## 2023-08-04 PROCEDURE — 700102 HCHG RX REV CODE 250 W/ 637 OVERRIDE(OP): Performed by: INTERNAL MEDICINE

## 2023-08-04 PROCEDURE — 700102 HCHG RX REV CODE 250 W/ 637 OVERRIDE(OP): Performed by: NURSE PRACTITIONER

## 2023-08-04 PROCEDURE — 700111 HCHG RX REV CODE 636 W/ 250 OVERRIDE (IP): Performed by: INTERNAL MEDICINE

## 2023-08-04 PROCEDURE — A9270 NON-COVERED ITEM OR SERVICE: HCPCS | Performed by: INTERNAL MEDICINE

## 2023-08-04 PROCEDURE — 85025 COMPLETE CBC W/AUTO DIFF WBC: CPT

## 2023-08-04 PROCEDURE — 700102 HCHG RX REV CODE 250 W/ 637 OVERRIDE(OP): Performed by: PHYSICAL MEDICINE & REHABILITATION

## 2023-08-04 RX ORDER — LAMOTRIGINE 100 MG/1
150 TABLET ORAL EVERY EVENING
Status: CANCELLED | OUTPATIENT
Start: 2023-08-04

## 2023-08-04 RX ORDER — LOSARTAN POTASSIUM 25 MG/1
25 TABLET ORAL
Status: DISCONTINUED | OUTPATIENT
Start: 2023-08-05 | End: 2023-08-11

## 2023-08-04 RX ORDER — AMOXICILLIN 250 MG
2 CAPSULE ORAL 2 TIMES DAILY
Status: DISCONTINUED | OUTPATIENT
Start: 2023-08-04 | End: 2023-08-22 | Stop reason: HOSPADM

## 2023-08-04 RX ORDER — ATORVASTATIN CALCIUM 80 MG/1
80 TABLET, FILM COATED ORAL EVERY EVENING
Status: CANCELLED | OUTPATIENT
Start: 2023-08-04

## 2023-08-04 RX ORDER — ACETAMINOPHEN 325 MG/1
650 TABLET ORAL EVERY 4 HOURS PRN
Status: DISCONTINUED | OUTPATIENT
Start: 2023-08-04 | End: 2023-08-22 | Stop reason: HOSPADM

## 2023-08-04 RX ORDER — SODIUM CHLORIDE 9 MG/ML
500 INJECTION, SOLUTION INTRAVENOUS ONCE
Status: COMPLETED | OUTPATIENT
Start: 2023-08-04 | End: 2023-08-04

## 2023-08-04 RX ORDER — HYDROXYZINE HYDROCHLORIDE 25 MG/1
50 TABLET, FILM COATED ORAL EVERY 6 HOURS PRN
Status: DISCONTINUED | OUTPATIENT
Start: 2023-08-04 | End: 2023-08-22 | Stop reason: HOSPADM

## 2023-08-04 RX ORDER — POLYETHYLENE GLYCOL 3350 17 G/17G
1 POWDER, FOR SOLUTION ORAL
Status: DISCONTINUED | OUTPATIENT
Start: 2023-08-04 | End: 2023-08-22 | Stop reason: HOSPADM

## 2023-08-04 RX ORDER — LANOLIN ALCOHOL/MO/W.PET/CERES
3 CREAM (GRAM) TOPICAL NIGHTLY PRN
Status: DISCONTINUED | OUTPATIENT
Start: 2023-08-04 | End: 2023-08-22 | Stop reason: HOSPADM

## 2023-08-04 RX ORDER — BISACODYL 10 MG
10 SUPPOSITORY, RECTAL RECTAL
Status: DISCONTINUED | OUTPATIENT
Start: 2023-08-04 | End: 2023-08-22 | Stop reason: HOSPADM

## 2023-08-04 RX ORDER — ATORVASTATIN CALCIUM 40 MG/1
80 TABLET, FILM COATED ORAL EVERY EVENING
Status: DISCONTINUED | OUTPATIENT
Start: 2023-08-04 | End: 2023-08-22 | Stop reason: HOSPADM

## 2023-08-04 RX ORDER — LACTULOSE 20 G/30ML
30 SOLUTION ORAL
Status: DISCONTINUED | OUTPATIENT
Start: 2023-08-04 | End: 2023-08-22 | Stop reason: HOSPADM

## 2023-08-04 RX ORDER — LOSARTAN POTASSIUM 50 MG/1
25 TABLET ORAL
Status: CANCELLED | OUTPATIENT
Start: 2023-08-05

## 2023-08-04 RX ORDER — SODIUM CHLORIDE 9 MG/ML
500 INJECTION, SOLUTION INTRAVENOUS ONCE
Status: DISCONTINUED | OUTPATIENT
Start: 2023-08-04 | End: 2023-08-04 | Stop reason: HOSPADM

## 2023-08-04 RX ORDER — LOSARTAN POTASSIUM 25 MG/1
25 TABLET ORAL DAILY
Qty: 30 TABLET | Refills: 0 | Status: ON HOLD | OUTPATIENT
Start: 2023-08-05 | End: 2023-08-21

## 2023-08-04 RX ORDER — ONDANSETRON 2 MG/ML
4 INJECTION INTRAMUSCULAR; INTRAVENOUS 4 TIMES DAILY PRN
Status: DISCONTINUED | OUTPATIENT
Start: 2023-08-04 | End: 2023-08-22 | Stop reason: HOSPADM

## 2023-08-04 RX ORDER — ALUMINA, MAGNESIA, AND SIMETHICONE 2400; 2400; 240 MG/30ML; MG/30ML; MG/30ML
20 SUSPENSION ORAL
Status: DISCONTINUED | OUTPATIENT
Start: 2023-08-04 | End: 2023-08-22 | Stop reason: HOSPADM

## 2023-08-04 RX ORDER — OMEPRAZOLE 20 MG/1
20 CAPSULE, DELAYED RELEASE ORAL
Status: DISCONTINUED | OUTPATIENT
Start: 2023-08-05 | End: 2023-08-22 | Stop reason: HOSPADM

## 2023-08-04 RX ORDER — MIDAZOLAM HYDROCHLORIDE 5 MG/ML
5 INJECTION INTRAMUSCULAR; INTRAVENOUS PRN
Status: DISCONTINUED | OUTPATIENT
Start: 2023-08-04 | End: 2023-08-22 | Stop reason: HOSPADM

## 2023-08-04 RX ORDER — ONDANSETRON 4 MG/1
4 TABLET, ORALLY DISINTEGRATING ORAL 4 TIMES DAILY PRN
Status: DISCONTINUED | OUTPATIENT
Start: 2023-08-04 | End: 2023-08-22 | Stop reason: HOSPADM

## 2023-08-04 RX ORDER — ECHINACEA PURPUREA EXTRACT 125 MG
2 TABLET ORAL PRN
Status: DISCONTINUED | OUTPATIENT
Start: 2023-08-04 | End: 2023-08-22 | Stop reason: HOSPADM

## 2023-08-04 RX ORDER — HYDRALAZINE HYDROCHLORIDE 25 MG/1
25 TABLET, FILM COATED ORAL EVERY 8 HOURS PRN
Status: DISCONTINUED | OUTPATIENT
Start: 2023-08-04 | End: 2023-08-22 | Stop reason: HOSPADM

## 2023-08-04 RX ORDER — HYDRALAZINE HYDROCHLORIDE 10 MG/1
10 TABLET, FILM COATED ORAL EVERY 8 HOURS PRN
Status: DISCONTINUED | OUTPATIENT
Start: 2023-08-04 | End: 2023-08-22 | Stop reason: HOSPADM

## 2023-08-04 RX ORDER — ATORVASTATIN CALCIUM 80 MG/1
80 TABLET, FILM COATED ORAL EVERY EVENING
Qty: 30 TABLET | Refills: 0 | Status: ON HOLD | OUTPATIENT
Start: 2023-08-04 | End: 2023-08-21 | Stop reason: SDUPTHER

## 2023-08-04 RX ORDER — CARBOXYMETHYLCELLULOSE SODIUM 5 MG/ML
1 SOLUTION/ DROPS OPHTHALMIC PRN
Status: DISCONTINUED | OUTPATIENT
Start: 2023-08-04 | End: 2023-08-22 | Stop reason: HOSPADM

## 2023-08-04 RX ORDER — TRAZODONE HYDROCHLORIDE 50 MG/1
50 TABLET ORAL
Status: DISCONTINUED | OUTPATIENT
Start: 2023-08-04 | End: 2023-08-22 | Stop reason: HOSPADM

## 2023-08-04 RX ADMIN — LOSARTAN POTASSIUM 25 MG: 50 TABLET, FILM COATED ORAL at 04:12

## 2023-08-04 RX ADMIN — ATORVASTATIN CALCIUM 80 MG: 40 TABLET, FILM COATED ORAL at 20:30

## 2023-08-04 RX ADMIN — SODIUM CHLORIDE 500 ML: 9 INJECTION, SOLUTION INTRAVENOUS at 09:35

## 2023-08-04 RX ADMIN — SENNOSIDES AND DOCUSATE SODIUM 2 TABLET: 50; 8.6 TABLET ORAL at 20:30

## 2023-08-04 RX ADMIN — APIXABAN 5 MG: 5 TABLET, FILM COATED ORAL at 04:12

## 2023-08-04 RX ADMIN — HYDRALAZINE HYDROCHLORIDE 10 MG: 20 INJECTION, SOLUTION INTRAMUSCULAR; INTRAVENOUS at 06:01

## 2023-08-04 RX ADMIN — HYDRALAZINE HYDROCHLORIDE 10 MG: 20 INJECTION, SOLUTION INTRAMUSCULAR; INTRAVENOUS at 01:12

## 2023-08-04 RX ADMIN — LAMOTRIGINE 150 MG: 100 TABLET ORAL at 20:30

## 2023-08-04 RX ADMIN — APIXABAN 5 MG: 5 TABLET, FILM COATED ORAL at 20:30

## 2023-08-04 ASSESSMENT — PATIENT HEALTH QUESTIONNAIRE - PHQ9
SUM OF ALL RESPONSES TO PHQ9 QUESTIONS 1 AND 2: 0
2. FEELING DOWN, DEPRESSED, IRRITABLE, OR HOPELESS: NOT AT ALL
1. LITTLE INTEREST OR PLEASURE IN DOING THINGS: NOT AT ALL

## 2023-08-04 ASSESSMENT — LIFESTYLE VARIABLES
TOTAL SCORE: 0
HAVE PEOPLE ANNOYED YOU BY CRITICIZING YOUR DRINKING: NO
ALCOHOL_USE: YES
HAVE YOU EVER FELT YOU SHOULD CUT DOWN ON YOUR DRINKING: NO
EVER HAD A DRINK FIRST THING IN THE MORNING TO STEADY YOUR NERVES TO GET RID OF A HANGOVER: NO
TOTAL SCORE: 0
CONSUMPTION TOTAL: INCOMPLETE
EVER_SMOKED: NEVER
EVER FELT BAD OR GUILTY ABOUT YOUR DRINKING: NO
TOTAL SCORE: 0

## 2023-08-04 ASSESSMENT — PAIN DESCRIPTION - PAIN TYPE
TYPE: OTHER (COMMENT)
TYPE: ACUTE PAIN

## 2023-08-04 NOTE — DISCHARGE PLANNING
Insurance remains pending.  Msg placed to Dr. Llamas-verifying medical clearance.    0855-Dr. Llamas has medically cleared.     1433-Insurance has authorized.  Case is under review by Dr. Pleitez.     1507-Dr. Pleitez has accepted.  Transport has been arranged via [facility] between 7682-0808.   Dr. Llamas is aware.  Msg placed to Rosanne TORRES, Hilda MARIN & Cami BSN.

## 2023-08-04 NOTE — DISCHARGE PLANNING
Case Management Discharge Planning    Admission Date: 8/1/2023  GMLOS: 2.9  ALOS: 3    6-Clicks ADL Score: 14  6-Clicks Mobility Score: 8  PT and/or OT Eval ordered: Yes  Post-acute Referrals Ordered: Yes  Post-acute Choice Obtained: Yes  Has referral(s) been sent to post-acute provider:  Yes      Anticipated Discharge Dispo: Discharge Disposition: Disch to  rehab facility or distinct part unit (62)    Discharge Address:  [facility] - [address] [place] [id number]    DME Needed: No    Action(s) Taken: Patient medically cleared for DC to IRF and accepted for transfer to [facility] today with [facility] transport scheduled for 3778-2462 Hrs.  CM updated patient & family members (son/Hadley & daughter) on DCP; They are all agreeable to transfer.    Transfer form handed to bedside nurse, Cami.    Escalations Completed: None    Medically Clear: Yes    Next Steps: CM remains available for assistance with DCP    Barriers to Discharge: None    Is the patient up for discharge tomorrow:   Medically cleared

## 2023-08-04 NOTE — THERAPY
Physical Therapy   Daily Treatment     Patient Name: Doreen Noe  Age:  67 y.o., Sex:  female  Medical Record #: [id number]  Today's Date: 8/3/2023     Precautions  Precautions: (P) Fall Risk  Comments: (P) Rt side weak, LE > UE    Assessment    Pt willing to participate w/PT, family BS. Functionally the pt required mod assist w/sup->sit transition for Lft LE management and initiation of trunk. Once seated, the pt could hold static sit w/SBA, cued to drop Rt hand onto the bed. STS and transfers from bed->w/c w/mod assist. Initiated amb in // bars, mod assist for 10' x 2 w/facilitation of Rt LE during swing, limited hip flex and no ankle. The pt would benefit from the 3 hrs of a IPR program.  PT will cont to follow.       Plan    Treatment Plan Status: (P) Continue Current Treatment Plan  Type of Treatment: Equipment, Bed Mobility, Gait Training, Manual Therapy, Neuro Re-Education / Balance, Self Care / Home Evaluation, Stair Training, Therapeutic Activities, Therapeutic Exercise  Treatment Frequency: 5 Times per Week  Treatment Duration: Until Therapy Goals Met    DC Equipment Recommendations: (P) Unable to determine at this time  Discharge Recommendations: (P) Recommend post-acute placement for additional physical therapy services prior to discharge home    Objective       08/03/23 1649   Charge Group   PT Gait Training (Units) 1   PT Therapeutic Activities (Units) 1   Total Time Spent   PT Gait Training Time Spent (Mins) 10   PT Therapeutic Activities Time Spent (Mins) 25   Precautions   Precautions Fall Risk   Comments Rt side weak, LE > UE   Balance   Sitting Balance (Static) Fair -   Sitting Balance (Dynamic) Poor +   Standing Balance (Static) Fair -   Standing Balance (Dynamic) Poor +   Weight Shift Sitting Fair   Weight Shift Standing Poor   Comments standing in // bars   Bed Mobility    Supine to Sit Moderate Assist  (HOB flat from Lft side)   Sit to Supine   (pt left seated in w/c)   Scooting Moderate  Assist  (seated scoot)   Rolling Standby Assist  (w/rail asssit)   Skilled Intervention Verbal Cuing;Postural Facilitation;Compensatory Strategies   Comments Sup->sit mod assist requiring Rt LE management and initiation of trunk.   Gait Analysis   Gait Level Of Assist Moderate Assist   Assistive Device Parallel Bars   Distance (Feet) 10   # of Times Distance was Traveled 1   Skilled Intervention Postural Facilitation;Compensatory Strategies;Verbal Cuing   Comments Initiated amb in // bars, the pt required Rt LE facilitation for initiation of swing. Static  // bars working on holding hips @ midline w/mirror for feedback.   Functional Mobility   Sit to Stand Moderate Assist  (from EOB)   Bed, Chair, Wheelchair Transfer Moderate Assist  (bed->w/c)   Transfer Method Stand Step   Comments The pt conts to require mod assist.   How much difficulty does the patient currently have...   Turning over in bed (including adjusting bedclothes, sheets and blankets)? 1   Sitting down on and standing up from a chair with arms (e.g., wheelchair, bedside commode, etc.) 1   Moving from lying on back to sitting on the side of the bed? 1   How much help from another person does the patient currently need...   Moving to and from a bed to a chair (including a wheelchair)? 2   Need to walk in a hospital room? 2   Climbing 3-5 steps with a railing? 1   6 clicks Mobility Score 8   Short Term Goals    Short Term Goal # 1 Pt will perform supine<>sit from flat HOB/no railing with supervision within 6 visits to ensure independent mobility at home.   Goal Outcome # 1 goal not met   Short Term Goal # 2 Pt will perform sit<>stand with FWW and supervision within 6 visits to ensure independent mobility at home.   Goal Outcome # 2 Goal not met   Short Term Goal # 3 Pt will ambulate x 150ft with FWW and supervision within 6 visits to ensure independent mobility at home.   Goal Outcome # 3 Goal not met   Short Term Goal # 4 Pt will ascend/descend  1 step with B UE support and min a within 6 visits to ensure entry/exit of home.   Goal Outcome # 4 Goal not met   Education Group   Role of Physical Therapist Patient Response Patient;Acceptance;Explanation;Action Demonstration   Physical Therapy Treatment Plan   Physical Therapy Treatment Plan Continue Current Treatment Plan   Anticipated Discharge Equipment and Recommendations   DC Equipment Recommendations Unable to determine at this time   Discharge Recommendations Recommend post-acute placement for additional physical therapy services prior to discharge home   Interdisciplinary Plan of Care Collaboration   IDT Collaboration with  Nursing   Patient Position at End of Therapy Seated  (in w/c w/family push)   Collaboration Comments Nrsg notified of pts tx efforts   Session Information   Date / Session Number  8/3--2 (2/5, 8/8) PTA/1   Supervising Physical Therapist (PTA Treatments Only)   Supervising Physical Therapist Kelly Ag

## 2023-08-04 NOTE — PROGRESS NOTES
Patient admitted to facility at 1700; accompanied by hospital transport.  Patient assisted to room and positioned in bed for comfort and safety; call light within reach.  Patient assisted with stowing belongings and oriented to room and facility.

## 2023-08-04 NOTE — PROGRESS NOTES
Monitor Summary: SR 80-84, NM 0.16, QRS 0.08, QT 0.44, with rare/occasional PVCs and couplets per strip from monitor room.

## 2023-08-04 NOTE — DISCHARGE INSTRUCTIONS
"Ischemic Stroke  Discharge Instructions    You experienced an Ischemic Stroke.  Ischemic stroke is the most common type of stroke and happens when an artery in the brain becomes blocked by a plaque fragment or blood clot. Typically, these blockages travel from the heart or larger arteries that supply the brain.  The brain needs a constant supply of blood, which carries oxygen and nutrients it needs to function.  A stroke occurs when one of these arteries to the brain is either blocked or bursts. As a result, part of the brain does not get the blood it needs, so it starts to die.         Stroke Risk Factors    You are at increased risk of having another stroke event.  See your Patient Stroke Guide to help reduce your stroke risk. These are your specific risk factors:  Age - Over 55  High blood pressure  High Cholesterol and lipids  Previous TIAs or \"mini strokes\"     Get help right away if you have any signs of a stroke.  \"BE FAST\" is an easy way to remember the main warning signs of a stroke:  B - Balance. Dizziness, sudden trouble walking, or loss of balance.  E - Eyes. Trouble seeing or a change in how you see.  F - Face. Sudden weakness or loss of feeling in the face. The face or eyelid may droop on one side.  A - Arms. Weakness or loss of feeling in an arm. This happens all of a sudden and most often on one side of the body.  S - Speech. Sudden trouble speaking, slurred speech, or trouble understanding what people say.  T - Time. Time to call emergency services. Write down what time symptoms started.    "

## 2023-08-05 ENCOUNTER — APPOINTMENT (OUTPATIENT)
Dept: PHYSICAL THERAPY | Facility: REHABILITATION | Age: 68
DRG: 057 | End: 2023-08-05
Attending: PHYSICAL MEDICINE & REHABILITATION
Payer: COMMERCIAL

## 2023-08-05 ENCOUNTER — APPOINTMENT (OUTPATIENT)
Dept: OCCUPATIONAL THERAPY | Facility: REHABILITATION | Age: 68
DRG: 057 | End: 2023-08-05
Attending: PHYSICAL MEDICINE & REHABILITATION
Payer: COMMERCIAL

## 2023-08-05 LAB
ALBUMIN SERPL BCP-MCNC: 4.3 G/DL (ref 3.2–4.9)
ALBUMIN/GLOB SERPL: 1.7 G/DL
ALP SERPL-CCNC: 90 U/L (ref 30–99)
ALT SERPL-CCNC: 22 U/L (ref 2–50)
ANION GAP SERPL CALC-SCNC: 12 MMOL/L (ref 7–16)
AST SERPL-CCNC: 22 U/L (ref 12–45)
BASOPHILS # BLD AUTO: 0.6 % (ref 0–1.8)
BASOPHILS # BLD: 0.05 K/UL (ref 0–0.12)
BILIRUB SERPL-MCNC: 0.5 MG/DL (ref 0.1–1.5)
BUN SERPL-MCNC: 15 MG/DL (ref 8–22)
CALCIUM ALBUM COR SERPL-MCNC: 9.2 MG/DL (ref 8.5–10.5)
CALCIUM SERPL-MCNC: 9.4 MG/DL (ref 8.5–10.5)
CHLORIDE SERPL-SCNC: 106 MMOL/L (ref 96–112)
CO2 SERPL-SCNC: 23 MMOL/L (ref 20–33)
CREAT SERPL-MCNC: 0.74 MG/DL (ref 0.5–1.4)
EOSINOPHIL # BLD AUTO: 0.14 K/UL (ref 0–0.51)
EOSINOPHIL NFR BLD: 1.6 % (ref 0–6.9)
ERYTHROCYTE [DISTWIDTH] IN BLOOD BY AUTOMATED COUNT: 42.3 FL (ref 35.9–50)
GFR SERPLBLD CREATININE-BSD FMLA CKD-EPI: 89 ML/MIN/1.73 M 2
GLOBULIN SER CALC-MCNC: 2.5 G/DL (ref 1.9–3.5)
GLUCOSE SERPL-MCNC: 129 MG/DL (ref 65–99)
HCT VFR BLD AUTO: 49.2 % (ref 37–47)
HGB BLD-MCNC: 16.5 G/DL (ref 12–16)
IMM GRANULOCYTES # BLD AUTO: 0.04 K/UL (ref 0–0.11)
IMM GRANULOCYTES NFR BLD AUTO: 0.5 % (ref 0–0.9)
LYMPHOCYTES # BLD AUTO: 1.76 K/UL (ref 1–4.8)
LYMPHOCYTES NFR BLD: 20.3 % (ref 22–41)
MAGNESIUM SERPL-MCNC: 1.8 MG/DL (ref 1.5–2.5)
MCH RBC QN AUTO: 29.6 PG (ref 27–33)
MCHC RBC AUTO-ENTMCNC: 33.5 G/DL (ref 32.2–35.5)
MCV RBC AUTO: 88.3 FL (ref 81.4–97.8)
MONOCYTES # BLD AUTO: 0.74 K/UL (ref 0–0.85)
MONOCYTES NFR BLD AUTO: 8.6 % (ref 0–13.4)
NEUTROPHILS # BLD AUTO: 5.92 K/UL (ref 1.82–7.42)
NEUTROPHILS NFR BLD: 68.4 % (ref 44–72)
NRBC # BLD AUTO: 0 K/UL
NRBC BLD-RTO: 0 /100 WBC (ref 0–0.2)
PLATELET # BLD AUTO: 220 K/UL (ref 164–446)
PMV BLD AUTO: 10.2 FL (ref 9–12.9)
POTASSIUM SERPL-SCNC: 3.9 MMOL/L (ref 3.6–5.5)
PROT SERPL-MCNC: 6.8 G/DL (ref 6–8.2)
RBC # BLD AUTO: 5.57 M/UL (ref 4.2–5.4)
SODIUM SERPL-SCNC: 141 MMOL/L (ref 135–145)
WBC # BLD AUTO: 8.7 K/UL (ref 4.8–10.8)

## 2023-08-05 PROCEDURE — 80053 COMPREHEN METABOLIC PANEL: CPT

## 2023-08-05 PROCEDURE — 83735 ASSAY OF MAGNESIUM: CPT

## 2023-08-05 PROCEDURE — 99221 1ST HOSP IP/OBS SF/LOW 40: CPT | Mod: AI | Performed by: HOSPITALIST

## 2023-08-05 PROCEDURE — 770010 HCHG ROOM/CARE - REHAB SEMI PRIVAT*

## 2023-08-05 PROCEDURE — 85025 COMPLETE CBC W/AUTO DIFF WBC: CPT

## 2023-08-05 PROCEDURE — 97166 OT EVAL MOD COMPLEX 45 MIN: CPT

## 2023-08-05 PROCEDURE — 97530 THERAPEUTIC ACTIVITIES: CPT

## 2023-08-05 PROCEDURE — 97535 SELF CARE MNGMENT TRAINING: CPT

## 2023-08-05 PROCEDURE — 97116 GAIT TRAINING THERAPY: CPT

## 2023-08-05 PROCEDURE — 36415 COLL VENOUS BLD VENIPUNCTURE: CPT

## 2023-08-05 PROCEDURE — 700102 HCHG RX REV CODE 250 W/ 637 OVERRIDE(OP): Performed by: PHYSICAL MEDICINE & REHABILITATION

## 2023-08-05 PROCEDURE — 99232 SBSQ HOSP IP/OBS MODERATE 35: CPT | Performed by: PHYSICAL MEDICINE & REHABILITATION

## 2023-08-05 PROCEDURE — A9270 NON-COVERED ITEM OR SERVICE: HCPCS | Performed by: PHYSICAL MEDICINE & REHABILITATION

## 2023-08-05 PROCEDURE — 97163 PT EVAL HIGH COMPLEX 45 MIN: CPT

## 2023-08-05 RX ADMIN — APIXABAN 5 MG: 5 TABLET, FILM COATED ORAL at 19:36

## 2023-08-05 RX ADMIN — APIXABAN 5 MG: 5 TABLET, FILM COATED ORAL at 10:06

## 2023-08-05 RX ADMIN — LAMOTRIGINE 150 MG: 100 TABLET ORAL at 19:37

## 2023-08-05 RX ADMIN — LOSARTAN POTASSIUM 25 MG: 25 TABLET, FILM COATED ORAL at 06:05

## 2023-08-05 RX ADMIN — OMEPRAZOLE 20 MG: 20 CAPSULE, DELAYED RELEASE ORAL at 10:06

## 2023-08-05 RX ADMIN — SENNOSIDES AND DOCUSATE SODIUM 2 TABLET: 50; 8.6 TABLET ORAL at 10:06

## 2023-08-05 RX ADMIN — ATORVASTATIN CALCIUM 80 MG: 40 TABLET, FILM COATED ORAL at 19:36

## 2023-08-05 ASSESSMENT — BRIEF INTERVIEW FOR MENTAL STATUS (BIMS)
WHAT DAY OF THE WEEK IS IT: CORRECT
BIMS SUMMARY SCORE: 15
WHAT YEAR IS IT: CORRECT
ASKED TO RECALL SOCK: YES, NO CUE REQUIRED
WHAT MONTH IS IT: ACCURATE WITHIN 5 DAYS
ASKED TO RECALL BED: YES, NO CUE REQUIRED
ASKED TO RECALL BLUE: YES, NO CUE REQUIRED
INITIAL REPETITION OF BED BLUE SOCK - FIRST ATTEMPT: 3

## 2023-08-05 ASSESSMENT — ACTIVITIES OF DAILY LIVING (ADL)
TOILETING: INDEPENDENT
BED_CHAIR_WHEELCHAIR_TRANSFER_DESCRIPTION: INCREASED TIME;INITIAL PREPARATION FOR TASK;SQUAT PIVOT TRANSFER TO WHEELCHAIR;SUPERVISION FOR SAFETY;VERBAL CUEING
BED_CHAIR_WHEELCHAIR_TRANSFER_DESCRIPTION: INCREASED TIME;INITIAL PREPARATION FOR TASK;SUPERVISION FOR SAFETY;VERBAL CUEING

## 2023-08-05 ASSESSMENT — ENCOUNTER SYMPTOMS
EYES NEGATIVE: 1
NAUSEA: 0
CHILLS: 0
FEVER: 0
MUSCULOSKELETAL NEGATIVE: 1
COUGH: 0
VOMITING: 0
BRUISES/BLEEDS EASILY: 0
ABDOMINAL PAIN: 0
POLYDIPSIA: 0
SHORTNESS OF BREATH: 0
FOCAL WEAKNESS: 1
PALPITATIONS: 0

## 2023-08-05 ASSESSMENT — PATIENT HEALTH QUESTIONNAIRE - PHQ9
SUM OF ALL RESPONSES TO PHQ9 QUESTIONS 1 AND 2: 0
1. LITTLE INTEREST OR PLEASURE IN DOING THINGS: NOT AT ALL
2. FEELING DOWN, DEPRESSED, IRRITABLE, OR HOPELESS: NOT AT ALL

## 2023-08-05 ASSESSMENT — GAIT ASSESSMENTS
ASSISTIVE DEVICE: PARALLEL BARS
GAIT LEVEL OF ASSIST: MAXIMAL ASSIST
DISTANCE (FEET): 8
ASSISTIVE DEVICE: FRONT WHEEL WALKER
DISTANCE (FEET): 6
GAIT LEVEL OF ASSIST: TOTAL ASSIST X 2

## 2023-08-05 ASSESSMENT — PAIN DESCRIPTION - PAIN TYPE: TYPE: ACUTE PAIN

## 2023-08-05 NOTE — THERAPY
Physical Therapy   Initial Evaluation     Patient Name: Doreen Noe  Age:  67 y.o., Sex:  female  Medical Record #: [id number]  Today's Date: 8/5/2023     Subjective    Pt is AxO x 4, she is pleasant and cooperative.      Objective       08/05/23 0830   PT Charge Group   Charges Yes   PT Therapeutic Activities (Units) 1   PT Evaluation PT Evaluation High   PT Total Time Spent   PT Individual Total Time Spent (Mins) 60   Prior Living Situation   Prior Services None   Housing / Facility 2 Story House   Steps Into Home 5   Steps In Home 12   Equipment Owned None   Lives with - Patient's Self Care Capacity Alone and Able to Care For Self   Prior Level of Functional Mobility   Bed Mobility Independent   Transfer Status Independent   Ambulation Independent   Assistive Devices Used None   Stairs Independent   Prior Functioning: Everyday Activities   Self Care Independent   Indoor Mobility (Ambulation) Independent   Stairs Independent   Functional Cognition Independent   Prior Device Use None of the given options   Pain   Intervention Declines   Cognition    Level of Consciousness Alert   Passive ROM Lower Body   Passive ROM Lower Body WDL   Active ROM Lower Body    Comments RLE significantly limited by strength deficits   Strength Lower Body   Lower Body Strength  X   Rt Hip Flexion Strength 2- (P-)   Rt Knee Extension Strength 2- (P-)   Rt Ankle Dorsiflexion Strength 0 (Zero)   Lt Hip Flexion Strength 5 (N)   Lt Knee Extension Strength 5 (N)   Lt Ankle Dorsiflexion Strength 5 (N)   Sensation Lower Body   Comments Grossly decreased sensation RLE.   Balance Assessment   Sitting Balance (Static) Fair   Sitting Balance (Dynamic) Fair -   Standing Balance (Static) Poor -   Standing Balance (Dynamic) Trace +   Weight Shift Sitting Fair   Weight Shift Standing Poor   Bed Mobility    Supine to Sit Moderate Assist   Sit to Supine Moderate Assist   Sit to Stand Moderate Assist   Scooting Moderate Assist   Rolling Maximum Assist  to Lt.;Moderate Assist to Rt.   Neurological Concerns   Neurological Concerns Yes   Footdrop Present   Roll Left and Right   Assistance Needed Physical assistance   Physical Assistance Level 76% or more   CARE Score - Roll Left and Right 2   Roll Left and Right Discharge Goal   Discharge Goal 6   Sit to Lying   Physical Assistance Level 51%-75%   CARE Score - Sit to Lying 2   Sit to Lying Discharge Goal   Discharge Goal 6   Lying to Sitting on Side of Bed   Physical Assistance Level 51%-75%   CARE Score - Lying to Sitting on Side of Bed 2   Sit to Stand   Assistance Needed Physical assistance   Physical Assistance Level 51%-75%   CARE Score - Sit to Stand 2   Sit to Stand Discharge Goal   Discharge Goal 6   Chair/Bed-to-Chair Transfer   Physical Assistance Level 76% or more   CARE Score - Chair/Bed-to-Chair Transfer 2   Chair/Bed-to-Chair Transfer Discharge Goal   Discharge Goal 6   Car Transfer   Reason if not Attempted Safety concerns   CARE Score - Car Transfer 88   Walk 10 Feet   Reason if not Attempted Safety concerns   CARE Score - Walk 10 Feet 88   Walk 10 Feet Discharge Goal   Discharge Goal 6   Walk 50 Feet with Two Turns   Reason if not Attempted Safety concerns   CARE Score - Walk 50 Feet with Two Turns 88   Walk 50 Feet with Two Turns Discharge Goal   Discharge Goal 4   Walk 150 Feet   Reason if not Attempted Safety concerns   CARE Score - Walk 150 Feet 88   Walk 150 Feet Discharge Goal   Discharge Goal 4   Walking 10 Feet on Uneven Surfaces   Reason if not Attempted Safety concerns   CARE Score - Walking 10 Feet on Uneven Surfaces 88   Walking 10 Feet on Uneven Surfaces Discharge Goal   Discharge Goal 4   1 Step (Curb)   Reason if not Attempted Safety concerns   CARE Score - 1 Step (Curb) 88   1 Step (Curb) Discharge Goal   Discharge Goal 4   4 Steps   Reason if not Attempted Safety concerns   CARE Score - 4 Steps 88   4 Steps Discharge Goal   Discharge Goal 4   12 Steps   Reason if not Attempted Safety  concerns   CARE Score - 12 Steps 88   12 Steps Discharge Goal   Discharge Goal 4   Picking Up Object   Assistance Needed Physical assistance   Physical Assistance Level Total assistance   CARE Score - Picking Up Object 1   Picking Up Object Discharge Goal   Discharge Goal 6   Wheel 50 Feet with Two Turns   Assistance Needed Physical assistance   Physical Assistance Level 76% or more   CARE Score - Wheel 50 Feet with Two Turns 2   Type of Wheelchair/Scooter Manual   Wheel 50 Feet with Two Turns Discharge Goal   Discharge Goal 6   Wheel 150 Feet   Physical Assistance Level Total assistance   CARE Score - Wheel 150 Feet 1   Type of Wheelchair/Scooter Manual   Wheel 150 Feet Discharge Goal   Discharge Goal 6   Gait Functional Level of Assist    Gait Level Of Assist Maximal Assist   Assistive Device Front Wheel Walker   Distance (Feet) 6   # of Times Distance was Traveled 2   Deviation Decreased Heel Strike;Decreased Toe Off;Bradykinetic;Shuffled Gait;Decreased Base Of Support;Step To   Wheelchair Functional Level of Assist   Wheelchair Assist Maximal Assist   Distance Wheelchair (Feet or Distance) 20   Stairs Functional Level of Assist   Level of Assist with Stairs Unable to Participate   Transfer Functional Level of Assist   Bed, Chair, Wheelchair Transfer Maximal Assist   Problem List    Problems Impaired Bed Mobility;Impaired Transfers;Impaired Ambulation;Functional ROM Deficit;Functional Strength Deficit;Impaired Balance;Impaired Coordination;Decreased Activity Tolerance;Safety Awareness Deficits / Cognition;Limited Knowledge of Post-Op Precautions   Precautions   Precautions Fall Risk   Comments R hemiparesis, R PRAFO   Current Discharge Plan   Current Discharge Plan Return to Prior Living Situation   Interdisciplinary Plan of Care Collaboration   IDT Collaboration with  Nursing;Occupational Therapist   Patient Position at End of Therapy Seated;Chair Alarm On;Phone within Reach;Tray Table within Reach;Call Light  within Reach   Benefit   Therapy Benefit Patient Would Benefit from Inpatient Rehabilitation Physical Therapy to Maximize Functional [place] with ADLs, IADLs and Mobility.   Strengths & Barriers   Strengths Able to follow instructions;Effective communication skills;Good insight into deficits/needs;Independent prior level of function;Pleasant and cooperative;Motivated for self care and independence   Barriers Decreased endurance;Fatigue;Generalized weakness;Hemiparesis;Home accessibility;Impaired activity tolerance;Impaired balance;Spasticity     Pt performed stand pivot transfer with MOD A to bed. Sit to supine with MOD A. Pt performed L rolling with MAX A, R rolling with MOD A. Supine to sit with MOD A. Stand pivot to w/c with MOD A.     Pt ambulated 3 bouts of 8' with parallel bars and MOD-MAX A. She demonstrates significant weakness of the RLE resulting in knee buckling, increased knee flexion at all times RLE. She required physical assistance for swing limb advancement of the RLE, stability of trunk pelvis and R knee. PRAFO boot used for ambulation, w/c follow throughout. Pt did ambulate one bout of 6' with FWW and MAX A with aforementioned gait deviations. Increased difficulty propelling FWW.     Pt performed stand pivot transfer to mat table. Seated balance activities including anterior/posterior weight shifting, lateral weight shifting, and RUE reaching performed with TC/VC for improved RLE weight bearing. Repeated sit to stands with MIN-MOD A.       Assessment  Patient is 67 y.o. female with a diagnosis of L CAROL ANN CVA.  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 Pt presents with significant hemiparesis of the RLE with 0/5 MMT for dorsiflexion and plantarflexion. She has poor trunk control in both sitting and standing positions.     Plan  Recommend Physical Therapy  minutes  per day 5-7 days per week for 4 weeks for the following treatments:  PT Group Therapy, PT E Stim Attended, PT Orthotics Training, PT Gait Training, PT Self Care/Home Eval, PT Therapeutic Exercises, PT Ultrasound, PT TENS Application, PT Neuro Re-Ed/Balance, PT Aquatic Therapy, PT Therapeutic Activity, PT Manual Therapy, PT Wound Therapy, PT Debridement, and PT Evaluation.    Passport items to be completed:  Get in/out of bed safely, in/out of a vehicle, safely use mobility device, walk or wheel around home/community, navigate up and down stairs, show how to get up/down from the ground, ensure home is accessible, demonstrate HEP, complete caregiver training    Goals:  Long term and short term goals have been discussed with patient and they are in agreement.    Physical Therapy Problems (Active)       Problem: Mobility       Dates: Start:  08/05/23         Goal: STG-Within one week, patient will propel wheelchair 50' with MIN A       Dates: Start:  08/05/23            Goal: STG-Within one week, patient will ambulate 30' with FWW and MIN A       Dates: Start:  08/05/23            Goal: STG-Within one week, patient will ascend and descend four to six stairs with MOD A and B handrails.        Dates: Start:  08/05/23               Problem: Mobility Transfers       Dates: Start:  08/05/23         Goal: STG-Within one week, patient will perform bed mobility including rolling L/R and supine<>sit with MIN A.        Dates: Start:  08/05/23            Goal: STG-Within one week, patient will transfer bed to chair with FWW and MIN A.        Dates: Start:  08/05/23               Problem: PT-Long Term Goals       Dates: Start:  08/05/23         Goal: LTG-By discharge, patient will ambulate 75' with FWW and SBA       Dates: Start:  08/05/23            Goal: LTG-By discharge, patient will transfer one surface to another with FWW independently.        Dates: Start:  08/05/23            Goal: LTG-By discharge, patient will ambulate  up/down 4-6 stairs with B handrails and SBA       Dates: Start:  08/05/23            Goal: LTG-By discharge, patient will transfer in/out of a car with FWW and supervision.        Dates: Start:  08/05/23

## 2023-08-05 NOTE — THERAPY
Physical Therapy   Daily Treatment     Patient Name: Doreen Noe  Age:  67 y.o., Sex:  female  Medical Record #: [id number]  Today's Date: 8/5/2023     Precautions  Precautions: Fall Risk  Comments: R hemiparesis, R PRAFO, mild R neglect, hx of seizures and L side CVA, [place]    Subjective    Pt very pleasant and agreeable to tx. Receptive to all education provided     Objective       08/05/23 1430   PT Charge Group   PT Gait Training (Units) 1   PT Therapeutic Activities (Units) 1   PT Total Time Spent   PT Individual Total Time Spent (Mins) 30   Gait Functional Level of Assist    Gait Level Of Assist Total Assist X 2  (mod-maxA with WC follow)   Assistive Device Parallel Bars   Distance (Feet) 8   # of Times Distance was Traveled 1   Deviation   (R LE extensor synergy, VC's to advance R UE, R knee block and facilitation for hip extension, assist to abduct R LE in swing)   Wheelchair Functional Level of Assist   Wheelchair Assist Moderate Assist   Distance Wheelchair (Feet or Distance) 80   Wheelchair Description Extra time;Impaired coordination;Limited by fatigue;Supervision for safety;Verbal cueing  (hemipropulsion, difficulty with available footwear)   Transfer Functional Level of Assist   Bed, Chair, Wheelchair Transfer Moderate Assist   Bed Chair Wheelchair Transfer Description Increased time;Initial preparation for task;Squat pivot transfer to wheelchair;Supervision for safety;Verbal cueing  (SQPT to L bed>WC)   Bed Mobility    Supine to Sit Moderate Assist   Sit to Stand Minimal Assist  (pull to stand)   Interdisciplinary Plan of Care Collaboration   IDT Collaboration with  Family / Caregiver;Physical Therapist;Occupational Therapist   Patient Position at End of Therapy Seated;Chair Alarm On;Self Releasing Lap Belt Applied;Call Light within Reach;Tray Table within Reach;Phone within Reach;Family / Friend in Room   Collaboration Comments Pt's son and daughter present during session and engaged      Educated pt on maintaining PROM for R UE by positioning into elbow extension while resting in bed due to presence of flexor synergy that increased with exertion  Educated family on reminding pt to check in and reposition R hemibody as well as sitting to pt's R side to encourage scanning of environment    Completed portion of CBS for neglect   The Shantal Bergego Scale (CBS) is a standardized checklist to detect the presence and degree of unilateral neglect during observation of everyday life situations. It is comprised of 10 everyday tasks that the therapist observes during performance of self-care activities.     The CBS uses a 4-point rating scale to indicate the severity of neglect for each item:  0= no neglect     1=mild neglect (patient always explores the right or left hemispace first and slowly or hesitantly explores the opposite side)  2=moderate neglect (patient demonstrates constant and clear right or left-sided omissions or collisions)  3=severe neglect (patient is only able to explore the right or left hemispace)     Neglected side: right                                                                                                          Category Score N/A (provide reason)   1 Gaze Orientation 1=mild neglect     2 Limb Awareness 2=moderate neglect    3 Auditory Attention     4 Personal Belongings     5 Dressing     6 Grooming     7 Navigation 1=mild neglect     8 Collisions 2=moderate neglect    9 Meals     10 Cleaning After Meals      TOTAL SCORE:  6      Divide Total Score by # of scored categories: 4 , multiply x10= Final Score 15     11-20 = Moderate neglect                                                                                                                                                                                                   Assessment    Pt demonstrated R LE extensor synergy during gait training and would benefit from use of either DF/eversion biased ACE wrap or  posterior AFO to maintain ankle joint integrity due to inversion tone. She also presented with R UE flexor synergy during WC mobility training, however she was able to actively extend elbow to reposition when her attention was brought to her limb. Pt's score on the CBS indicates that she is limited by moderate neglect, however the full measure was not completed due to time restraints.  Strengths: Able to follow instructions, Effective communication skills, Good insight into deficits/needs, Independent prior level of function, Pleasant and cooperative, Motivated for self care and independence  Barriers: Decreased endurance, Fatigue, Generalized weakness, Hemiparesis, Home accessibility, Impaired activity tolerance, Impaired balance, Spasticity    Plan    R LE/ trunk NRE  Gait training // bars vs byrd rail with either DF/eversion ace wrap or posterior AFO  Squat pivot transfer training  Rolling, supine<>sit    Passport items to be completed:  Get in/out of bed safely, in/out of a vehicle, safely use mobility device, walk or wheel around home/community, navigate up and down stairs, show how to get up/down from the ground, ensure home is accessible, demonstrate HEP, complete caregiver training    Physical Therapy Problems (Active)       Problem: Mobility       Dates: Start:  08/05/23         Goal: STG-Within one week, patient will propel wheelchair 50' with MIN A       Dates: Start:  08/05/23            Goal: STG-Within one week, patient will ambulate 30' with FWW and MIN A       Dates: Start:  08/05/23            Goal: STG-Within one week, patient will ascend and descend four to six stairs with MOD A and B handrails.        Dates: Start:  08/05/23               Problem: Mobility Transfers       Dates: Start:  08/05/23         Goal: STG-Within one week, patient will perform bed mobility including rolling L/R and supine<>sit with MIN A.        Dates: Start:  08/05/23            Goal: STG-Within one week, patient will  transfer bed to chair with FWW and MIN A.        Dates: Start:  08/05/23               Problem: PT-Long Term Goals       Dates: Start:  08/05/23         Goal: LTG-By discharge, patient will ambulate 75' with FWW and SBA       Dates: Start:  08/05/23            Goal: LTG-By discharge, patient will transfer one surface to another with FWW independently.        Dates: Start:  08/05/23            Goal: LTG-By discharge, patient will ambulate up/down 4-6 stairs with B handrails and SBA       Dates: Start:  08/05/23            Goal: LTG-By discharge, patient will transfer in/out of a car with FWW and supervision.        Dates: Start:  08/05/23

## 2023-08-05 NOTE — THERAPY
"Occupational Therapy   Initial Evaluation     Patient Name: Doreen Noe  Age:  67 y.o., Sex:  female  Medical Record #: [id number]  Today's Date: 8/5/2023     Subjective    \"I need to have everything on my right side so I pay attention to it.\"      Objective       08/05/23 0931   OT Charge Group   Charges Yes   OT Self Care / ADL (Units) 2   OT Therapy Activity (Units) 1   OT Evaluation OT Evaluation Mod   OT Total Time Spent   OT Individual Total Time Spent (Mins) 90   Prior Living Situation   Prior Services None   Housing / Facility 2 Story House   Steps Into Home 5   Steps In Home 12   Equipment Owned None   Lives with - Patient's Self Care Capacity Alone and Able to Care For Self   Comments Adult children live locally and are available to provide assistance upon d/c   Prior Level of ADL Function   Self Feeding Independent   Grooming / Hygiene Independent   Bathing Independent   Dressing Independent   Toileting Independent   Prior Level of IADL Function   Medication Management Independent   Laundry Independent   Kitchen Mobility Independent   Finances Independent   Home Management Independent   Shopping Independent   Prior Level Of Mobility Independent Without Device in Community   Driving / Transportation Driving Independent   Occupation (Pre-Hospital Vocational) Retired Due To Age   Leisure Interests Pets   Prior Functioning: Everyday Activities   Self Care Independent   Indoor Mobility (Ambulation) Independent   Stairs Independent   Functional Cognition Independent   Prior Device Use None of the given options   Pain   Intervention Declines   Cognition    Cognition / Consciousness X   Speech/ Communication Word Finding Impairment   Level of Consciousness Alert   Comments Pt notes mild word finding difficulty   ABS (Agitated Behavior Scale)   Agitated Behavior Scale Performed No   Cognitive Pattern Assessment   Cognitive Pattern Assessment Used BIMS   Brief Interview for Mental Status (BIMS)   Repetition of " "Three Words (First Attempt) 3   Temporal Orientation: Year Correct   Temporal Orientation: Month Accurate within 5 days   Temporal Orientation: Day Correct   Recall: \"Sock\" Yes, no cue required   Recall: \"Blue\" Yes, no cue required   Recall: \"Bed\" Yes, no cue required   BIMS Summary Score 15   Confusion Assessment Method (CAM)   Is there evidence of an acute change in mental status from the patient's baseline? No   Inattention Behavior not present   Disorganized thinking Behavior not present   Altered level of consciousness Behavior not present   Active ROM Upper Body   Active ROM Upper Body  WDL   Dominant Hand Right   Strength Upper Body   Upper Body Strength  X   Rt Shoulder Flexion Strength 4- (G-)   Rt Shoulder Abduction Strength 4- (G-)   Rt Elbow Flexion Strength 4- (G-)   Right  Impaired   Sensation Upper Body   Upper Extremity Sensation  WDL   Upper Body Muscle Tone   Upper Body Muscle Tone  WDL   Balance Assessment   Sitting Balance (Static) Fair   Sitting Balance (Dynamic) Fair -   Standing Balance (Static) Poor -   Standing Balance (Dynamic) Trace +   Weight Shift Sitting Fair   Weight Shift Standing Poor   Bed Mobility    Supine to Sit Moderate Assist   Sit to Supine Moderate Assist   Sit to Stand Moderate Assist   Scooting Moderate Assist   Rolling Maximum Assist to Lt.;Moderate Assist to Rt.   Coordination Upper Body   Coordination X   Fine Motor Coordination Impaired pad to pad RUE   Gross Motor Coordination Impaired index finger to nose with vision occluded.   Eating   Assistance Needed Supervision   CARE Score - Eating 4   Eating Discharge Goal   Discharge Goal 6   Oral Hygiene   Assistance Needed Supervision   CARE Score - Oral Hygiene 4   Oral Hygiene Discharge Goal   Discharge Goal 6   Shower/Bathe Self   Assistance Needed Physical assistance   Physical Assistance Level 51%-75%   CARE Score - Shower/Bathe Self 2   Shower/Bathe Self Discharge Goal   Discharge Goal 4   Upper Body Dressing "   Assistance Needed Physical assistance   Physical Assistance Level 26%-50%   CARE Score - Upper Body Dressing 3   Upper Body Dressing Discharge Goal   Discharge Goal 6   Lower Body Dressing   Assistance Needed Physical assistance   Physical Assistance Level 76% or more   CARE Score - Lower Body Dressing 2   Lower Body Dressing Discharge Goal   Discharge Goal 6   Putting On/Taking Off Footwear   Assistance Needed Physical assistance   Physical Assistance Level Total assistance   CARE Score - Putting On/Taking Off Footwear 1   Putting On/Taking Off Footwear Discharge Goal   Discharge Goal 6   Toileting Hygiene   Assistance Needed Physical assistance   Physical Assistance Level 26%-50%   CARE Score - Toileting Hygiene 3   Toileting Hygiene Discharge Goal   Discharge Goal 6   Toilet Transfer   Assistance Needed Physical assistance   Physical Assistance Level 51%-75%   CARE Score - Toilet Transfer 2   Toilet Transfer Discharge Goal   Discharge Goal 6   Hearing, Speech, and Vision   Ability to Hear Adequate   Ability to See in Adequate Light Adequate   Expression of Ideas and Wants Without difficulty   Understanding Verbal and Non-Verbal Content Understands   Functional Level of Assist   Eating Supervision   Eating Description Increased time   Grooming Minimal Assist   Grooming Description Seated in wheelchair at sink;Increased time;Supervision for safety;Standing at sink   Bathing Maximal Assist   Bathing Description Grab bar;Hand held shower;Assit wtih lower extremities;Assit with back;Initial preparation for task;Increased time;Supervision for safety;Verbal cueing;Tub bench   Upper Body Dressing Minimal Assist   Upper Body Dressing Description Assit with threading arms through sleeves;Assist with closures;Increased time;Supervision for safety   Lower Body Dressing Maximal Assist   Lower Body Dressing Description Assist with threading into pant leg;Increased time;Initial preparation for task;Supervision for  safety;Verbal cueing;Assist with closures   Bed, Chair, Wheelchair Transfer Maximal Assist   Bed Chair Wheelchair Transfer Description Increased time;Initial preparation for task;Supervision for safety;Verbal cueing   Tub / Shower Transfers Maximal Assist   Tub Shower Transfer Description Increased time;Assist with one limb;Grab bar;Shower bench;Drop arm chair;Initial preparation for task;Requires lift;Supervision for safety;Verbal cueing   Problem List   Problem List Decreased Active Daily Living Skills;Decreased Homemaking Skills;Decreased Functional Mobility;Decreased Activity Tolerance;Impaired Postural Control / Balance   Precautions   Precautions Fall Risk   Comments R hemiparesis, R PRAFO, mild R neglect, hx of seizures and L side CVA, [place]   Current Discharge Plan   Current Discharge Plan Return to Prior Living Situation   Interdisciplinary Plan of Care Collaboration   IDT Collaboration with  Nursing;Occupational Therapist       Assessment  Patient is 67 y.o. female with a diagnosis of ischemic stroke.  Additional factors influencing patient status / progress (ie: cognitive factors, co-morbidities, social support, etc): a past medical history of seizure disorder, prior right brain stroke, chronic tremors, and neuropathy.  Patient was previously independent with ADL/IADLs with a good social/family support system locally in [place].     Mild right neglect and right lateral lean noted during shower activity with R knee block and lift required for transfers. No s/s of R flexor synergy noted during eval or subsequent treatment, however, PT reported that this did occur during her treatment time later in the day. Poor initial righting reaction with bilateral reach activity EOB with consistent improvement noted during session. RLE sensation differences also noted.  BUE resting tremor noted but per patient this is her baseline from prior CVA.      Plan  Recommend Occupational Therapy  minutes per day 5-7 days  per week for 14 days for the following treatments:  OT E Stim Attended, OT Self Care/ADL, OT Community Reintegration, OT Neuro Re-Ed/Balance, OT Therapeutic Activity, OT Evaluation, and OT Therapeutic Exercise.    Passport items to be completed:  Perform bathroom transfers, complete dressing, complete feeding, get ready for the day, prepare a simple meal, participate in household tasks, adapt home for safety needs, demonstrate home exercise program, complete caregiver training     Goals:  Long term and short term goals have been discussed with patient and they are in agreement.    Occupational Therapy Goals (Active)       Problem: Dressing       Dates: Start:  08/05/23         Goal: STG-Within one week, patient will dress UB at a Mod I level.        Dates: Start:  08/05/23            Goal: STG-Within one week, patient will dress LB at a Min A level with AE/AD PRN.        Dates: Start:  08/05/23               Problem: Functional Transfers       Dates: Start:  08/05/23         Goal: STG-Within one week, patient will transfer to toilet at a CGA level with AE/AD PRN.        Dates: Start:  08/05/23            Goal: STG-Within one week, patient will transfer to tub/shower at a CGA level with AE/AD/DME PRN.        Dates: Start:  08/05/23               Problem: OT Long Term Goals       Dates: Start:  08/05/23         Goal: LTG-By discharge, patient will complete basic self care tasks at a SUP/Mod I level with AE/AD/DME PRN.        Dates: Start:  08/05/23            Goal: LTG-By discharge, patient will perform bathroom transfers at a SUP/Mod I level with AE/AD/DME PRN.        Dates: Start:  08/05/23            Goal: LTG-By discharge, patient will complete basic home management at a Min A to Mod I level with LRD and AE PRN.        Dates: Start:  08/05/23               Problem: Toileting       Dates: Start:  08/05/23         Goal: STG-Within one week, patient will complete toileting tasks at a CGA level with AE/AD/DME PRN.         Dates: Start:  08/05/23

## 2023-08-05 NOTE — CARE PLAN
Problem: Pain - Standard  Goal: Alleviation of pain or a reduction in pain to the patient’s comfort goal  Outcome: Progressing     Problem: Fall Risk - Rehab  Goal: Patient will remain free from falls  Outcome: Progressing   The patient is Watcher - Medium risk of patient condition declining or worsening

## 2023-08-05 NOTE — CARE PLAN
Problem: Pain - Standard  Goal: Alleviation of pain or a reduction in pain to the patient’s comfort goal  Flowsheets (Taken 8/4/2023 1784)  Pain Rating Scale (NPRS): 6     Problem: Fall Risk - Rehab  Goal: Patient will remain free from falls  Note: Patient remains free from falls this shift. Patient was educated on using the call light to prevent falls. Patients bed is in the lowest position. The patients belongings are placed in near proximity to the patient.     The patient is Stable - Low risk of patient condition declining or worsening

## 2023-08-05 NOTE — PROGRESS NOTES
4 Eyes Skin Assessment Completed by CHRISTINA barton and CHRISTINA Roy.    Head WDL  Ears WDL  Nose WDL  Mouth WDL  Neck WDL  Breast/Chest WDL  Shoulder Blades WDL  Spine WDL  (R) Arm/Elbow/Hand WDL  (L) Arm/Elbow/Hand WDL  Abdomen WDL  Groin WDL  Scrotum/Coccyx/Buttocks Blanching  (R) Leg WDL  (L) Leg WDL  (R) Heel/Foot/Toe WDL  (L) Heel/Foot/Toe WDL          Devices In Places N/A      Interventions In Place Pillows and Pressure Redistribution Mattress    Possible Skin Injury No    Pictures Uploaded Into Epic N/A  Wound Consult Placed N/A  RN Wound Prevention Protocol Ordered No

## 2023-08-05 NOTE — CARE PLAN
The patient is Stable - Low risk of patient condition declining or worsening    Shift Goals  Clinical Goals: safety  Patient Goals: safety    Progress made toward(s) clinical / shift goals:      Problem: Pain - Standard  Goal: Alleviation of pain or a reduction in pain to the patient’s comfort goal  Outcome: Progressing   --no pain noted  Problem: Fall Risk - Rehab  Goal: Patient will remain free from falls  Outcome: Progressing   ---fall education       Patient is not progressing towards the following goals: none

## 2023-08-05 NOTE — PROGRESS NOTES
NURSING DAILY NOTE    Name: Doreen Noe   Date of Admission: 8/4/2023   Admitting Diagnosis: Ischemic stroke (HCC)  Attending Physician: Leona Pleitez M.d.  Allergies: Patient has no known allergies.    Safety  Patient Assist     Patient Precautions     Precaution Comments     Bed Transfer Status     Toilet Transfer Status      Assistive Devices     Oxygen  None - Room Air  Diet/Therapeutic Dining  Current Diet Order   Procedures    Diet Order Diet: Regular; Tray Modifications (optional): SLP - Deliver to Nursing Station     Pill Administration  whole  Agitated Behavioral Scale     ABS Level of Severity       Fall Risk  Has the patient had a fall this admission?   No  Mariela Rodgers Fall Risk Scoring  13, MODERATE RISK  Fall Risk Safety Measures  bed alarm, chair alarm, poor balance, and low vision/ hearing    Vitals  Temperature: 36.6 °C (97.9 °F)  Temp src: Temporal  Pulse: 86  Respiration: 18  Blood Pressure : (!) 141/79  Blood Pressure MAP (Calculated): 100 MM HG  BP Location: Right, Upper Arm  Patient BP Position: Supine     Oxygen  Pulse Oximetry: 94 %  O2 Delivery Device: None - Room Air    Bowel and Bladder  Last Bowel Movement  08/04/23 (per report)  Stool Type     Bowel Device     Continent  Bladder: Continent void   Bowel: Continent movement  Bladder Function     Genitourinary Assessment        Skin  Angus Score   18  Sensory Interventions   Bed Types: Standard/Trauma Mattress  Skin Preventative Measures: Pillows in Use for Support / Positioning  Moisture Interventions  Moisturizers/Barriers: Barrier Wipes      Pain  Pain Rating Scale  6 - Hard to ignore, avoid usual activities  Pain Location  Neck  Pain Location Orientation     Pain Interventions   Cold Pack    ADLs    Bathing      Linen Change      Personal Hygiene     Chlorhexidine Bath      Oral Care     Teeth/Dentures     Shave     Nutrition Percentage Eaten     Environmental  Precautions     Patient Turns/Positioning  Patient Turns Self from Side to Side  Patient Turns Assistance/Tolerance     Bed Positions     Head of Bed Elevated         Psychosocial/Neurologic Assessment  Psychosocial Assessment  Psychosocial (WDL):  Within Defined Limits  Neurologic Assessment  Neuro (WDL): Exceptions to WDL  Level of Consciousness: Alert  Orientation Level: Oriented X4  Cognition: Appropriate judgement, Appropriate safety awareness, Appropriate attention/concentration, Appropriate for developmental age, Follows commands  Speech: Clear  Pupil Assesment: No  Motor Function/Sensation Assessment: Dorsiflexion, Motor response, Sensation, Motor strength  R Foot Dorsiflexion: Weak  L Foot Dorsiflexion: Strong  RUE Motor Response: Responds to commands  RUE Sensation: Full sensation  Muscle Strength Right Arm: Good Strength Against Gravity and Moderate Resistance  LUE Motor Response: Tremors, Responds to commands  LUE Sensation: Full sensation  Muscle Strength Left Arm: Normal Strength Against Gravity and Full Resistance  RLE Motor Response: Responds to commands  RLE Sensation: Numbness  Muscle Strength Right Leg: Fair Strength against Gravity but No Resistance  LLE Motor Response: Responds to commands  LLE Sensation: Full sensation  Muscle Strength Left Leg: Normal Strength Against Gravity and Full Resistance  EENT (WDL):  WDL Except    Cardio/Pulmonary Assessment  Edema      Respiratory Breath Sounds  RUL Breath Sounds: Clear  RML Breath Sounds: Clear  RLL Breath Sounds: Clear  MONIKA Breath Sounds: Clear  LLL Breath Sounds: Clear  Cardiac Assessment   Cardiac (WDL):  WDL Except

## 2023-08-05 NOTE — H&P
"REHABILITATION HISTORY AND PHYSICAL/POST ADMISSION EVALUATION    8/4/2023  5:31 PM  Doreen Noe  RH26/01  Admission  8/4/2023  4:53 PM  [facility] Code/Reason for admission: 0001.2 - Stroke: Right Body Involvement (Left Brain)   Etiologic diagnosis/problem: Ischemic stroke (HCC)  Chief Complaint: right leg weakness    HPI:  Per Dr. Dean's consult, \"The patient is a 67 y.o.  female with a past medical history of seizure disorder, prior right brain stroke, chronic tremors, and neuropathy;  who presented on 8/1/2023  8:51 AM with new onset Right sided weakness. Per documentation, patient awoke with right sided weakness and rolled of her bed. She did not hit head or LOC. 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 Neuro consulted, patient outside the window for TNKase and was not a candidate for IR intervention given no LVO. MRI brain was obtained that showed small acute ischemic stroke in the L CAROL ANN territory. Echo is pending. Neuro recommending starting eliquis and statin for secondary stroke ppx. BP goals is 100-130/60-80.\"    HgbA1c 5.7, , ECHO EF 65 %, with grade 1 diastolic dysfunction. She had a Ziopatch cardiac monitor placed.     Patient currently reports right leg weakness, worse since she first got to the hospital. She also reports abnormal sensation in her right leg.     She has a 13 year history of seizure disorder for which she follows with a neurologist in [place]. Her last seizure was in November 2022 and was clonic-tonic. She also has a chronic tremor, left hand worse than right.     She had completed recovery from her right brain stroke that occurred back in 2016.     Patient was evaluated by Rehab Medicine physician and Physical Therapy, Occupational Therapy, and Speech Therapy and determined to be appropriate for acute inpatient rehab and was transferred to [facility] on 8/4/2023  4:53 PM.    With this acute therapeutic intervention, this patient hopes to improve her functional status, and return to independent living with the supportive care of signigicant other.    REVIEW OF SYSTEMS:     A complete review of systems was performed and was negative in detail with the exception of items mentioned elsewhere in this document.    PMH:  Past Medical History:   Diagnosis Date    HTN (hypertension) 08/04/2023    Hyperlipidemia     Muscle disorder     Neuropathy (HCC)     from right shoulder injury    Seizure disorder (HCC)     10/25/2019     Stroke (HCC)     01/28/2019     Substance abuse (HCC)     Thrombocytopenia (HCC) 08/01/2023    Tremor        PSH:  Past Surgical History:   Procedure Laterality Date    SD COLONOSCOPY,DIAGNOSTIC N/A 9/28/2021    Procedure: COLONOSCOPY;  Surgeon: Nir Hemphill M.D.;  Location: SUR[facility]ERY [facility];  Service: Gastroenterology    SD NEUROPLASTY & OR TRANSPOS MEDIAN NRV CARPAL KEVIN Left 1/21/2020    Procedure: Left carpal tunnel release.;  Surgeon: Nir Marin M.D.;  Location: SURGERY [facility];  Service: Orthopedics       Family History   Problem Relation Age of Onset    Hypertension Mother     Dementia Mother     Heart Failure Mother     Heart Failure Father     Heart Attack Father     Kidney Disease Father     Hypertension Father         MEDICATIONS:  Scheduled Medications   Medication Dose Frequency    senna-docusate  2 Tablet BID    [START ON 8/5/2023] omeprazole  20 mg QAM AC    apixaban  5 mg BID    atorvastatin  80 mg Q EVENING    lamoTRIgine  150 mg Q EVENING    [START ON 8/5/2023] losartan  25 mg Q DAY       ALLERGIES:  Patient has no known allergies.    PSYCHOSOCIAL HISTORY:  Patient lives alone, but can stay with SO who lives in a single story house. Patient lives in a 2 story house with 5 DENZEL.     She retired in 2019 from being a  for many years in [place].    She is , has 2  children, and 2 grandchildren. She has a significant other for the last 9 years.    No tobacco, occasional alcohol.     LEVEL OF FUNCTION PRIOR TO DISABILTY:  Independent     LEVEL OF FUNCTION PRIOR TO ADMISSION to [facility]:  PT 8/3:    Balance   Sitting Balance (Static) Fair -   Sitting Balance (Dynamic) Poor +   Standing Balance (Static) Fair -   Standing Balance (Dynamic) Poor +   Weight Shift Sitting Fair   Weight Shift Standing Poor   Comments standing in // bars   Bed Mobility    Supine to Sit Moderate Assist  (HOB flat from Lft side)   Sit to Supine    (pt left seated in w/c)   Scooting Moderate Assist  (seated scoot)   Rolling Standby Assist  (w/rail asssit)   Skilled Intervention Verbal Cuing;Postural Facilitation;Compensatory Strategies   Comments Sup->sit mod assist requiring Rt LE management and initiation of trunk.   Gait Analysis   Gait Level Of Assist Moderate Assist   Assistive Device Parallel Bars   Distance (Feet) 10   # of Times Distance was Traveled 1   Skilled Intervention Postural Facilitation;Compensatory Strategies;Verbal Cuing   Comments Initiated amb in // bars, the pt required Rt LE facilitation for initiation of swing. Static  // bars working on holding hips @ midline w/mirror for feedback.   Functional Mobility   Sit to Stand Moderate Assist  (from EOB)   Bed, Chair, Wheelchair Transfer Moderate Assist  (bed->w/c)   Transfer Method Stand Step   Comments The pt conts to require mod assist.       OT 8/2:    Cognition    Cognition / Consciousness X   Speech/ Communication Delayed Responses;Word Finding Impairment;Expressive Aphasia   Level of Consciousness Alert   Ability To Follow Commands 1 Step   Safety Awareness Impaired   Attention Impaired   Sequencing Impaired   Initiation Impaired   Passive ROM Upper Body   Passive ROM Upper Body WDL   Active ROM Upper Body   Active ROM Upper Body  X   Dominant Hand Right   Comments grossly impaired right (can get hand  to face and hair for ADLS with left assist, but not full ROM); left WNL   Strength Upper Body   Upper Body Strength  X   Comments R grossly 3-/5; left WDL   Sensation Upper Body   Upper Extremity Sensation  X   Comments Pt reports some decreased sensation right but is not able to specify   Upper Body Muscle Tone   Upper Body Muscle Tone  WDL   Neurological Concerns   Neurological Concerns Yes  (baseline left hand tremor or twitch due to old CVA)   Sitting Posture During ADL's Lateral Lean Right   Standing Posture During ADL's Lateral Lean Right   Rt Upper Extremity Gross Motor Control Impaired   Rt Upper Extremity Fine Motor Control Impaired   Rt Upper Extremity Functional Use Impaired   Right In Hand Manipulation Impaired   Coordination Upper Body   Coordination X   Fine Motor Coordination impaired right   Gross Motor Coordination impaired right   Balance Assessment   Sitting Balance (Static) Fair -   Sitting Balance (Dynamic) Poor +   Standing Balance (Static) Poor   Standing Balance (Dynamic) Poor   Weight Shift Sitting Poor   Weight Shift Standing Poor   Comments with bilateral HHA   Bed Mobility    Supine to Sit Moderate Assist   Scooting Maximal Assist   ADL Assessment   Grooming Seated;Minimal Assist   Upper Body Dressing Minimal Assist   Lower Body Dressing Maximal Assist   Functional Mobility   Sit to Stand Moderate Assist   Bed, Chair, Wheelchair Transfer Moderate Assist   Comments two person HHA to chair with assist for moving right leg even when unweighted   Visual Perception   Visual Perception  X   Neglect Mild Right   Comments can look right and see obj in right field when prompted   Activity Tolerance   Sitting in Chair left up in chair with family present and alarm   Sitting Edge of Bed 10 min   Standing 3 min       SLP 8/3:    Clinical Impressions  Patient presents with functional oropharyngeal swallow for recommended diet regular solids/thin liquids. No overt s/sx of aspiration. No diagnostic  swallow evaluation indicated at this time. No acute speech therapy needs at this junction. Please re-consult SLP if any concerns or difficulties arise.     Assessment  The patient was seen this date for a cognitive-linguistic evaluation. The standardized assessment, Cognistat (The Neurobehavioral Cognitive status Examination) was conducted and the Pt's results are as follows:     Orientation: Average  Attention: Average  Comprehension: Average  Repetition: Average  Naming: Average  Memory: Average  Calculations: Moderate  Similarities: Mild  Judgement: Average     Non-standardized measures were also conducted to assess cognitive domains. Auditory comprehension: Pt answered simple yes/no questions: 2/2, moderate yes/no questions: 3/3, complex yes/no questions 3/3. Pt followed simple commands 2/2, moderate commands: 3/3, and complex commands: 3/3. The medical management tool was administered and the Pt accurately demo' d dosage management and strategies to ensure prevention of missed dose. A language sample was conducted via picture sample: Pt demo' d functional descriptions, grammar, and complex sentence structure. Per Pt report, Pt independent with IADLS at home.         Clinical Impressions  Patient presents with functional cognitive-linguistic skills for discharge environment. Pt demo' d moderate deficits re: calculations, however, per Pt report, consistent with baseline and Pt utilizes calculator at home. Pt denied any changes or concerns re: cognition. Pt expressed she will be discharged to spouse's home who will be able to assist with any IADLS as needed. No acute speech therapy needs indicated at this time. Please re-consult SLP if any changes arise.     CURRENT LEVEL OF FUNCTION:   Same as level of function prior to admission to [facility]    PHYSICAL EXAM:     VITAL SIGNS:   temporal temperature is 36.6 °C (97.9 °F). Her blood pressure is 156/85 (abnormal) and her pulse is 85. Her  respiration is 18 and oxygen saturation is 94%.     GENERAL: No apparent distress  HEENT: Normocephalic/atraumatic  CARDIAC: Regular rate and rhythm, normal S1, S2, no murmurs, no peripheral edema   LUNGS: Clear to auscultation, normal respiratory effort, on room air   ABDOMINAL: bowel sounds present, soft, and nontender    EXTREMITIES: no edema or 2+ bilateral DP/PT pulses  MSK: Coarse tremor in left hand    NEURO:    Mental status: Alert    Motor:  Shoulder flexors:  Right -  4/5, Left -  5/5  Elbow flexors:  Right -  4/5, Left -  5/5  Elbow extensors:  Right -  4/5, Left -  5/5  Slightly weaker  on the right  Hip flexors:  Right -  0/5, Left -  5/5  Knee ext:  Right -  0/5, Left -  5/5  Dorsiflexors:  Right -  0/5, Left -  5/5  EHL:  Right -  0/5, Left - 5/5  Plantar flexors:  Right -  0/5, Left -  5/5   Positive pronator drift on the right    Sensory:   Decreased sensation to light touch on the right lower leg    DTRs:   1+ in bilateral biceps, triceps, brachioradialis  2+ in left patellar tendon and 1+ right patellar tendon  No clonus at bilateral ankles  Negative babinski b/l  Negative Capps b/l     RADIOLOGY:    Imaging personally reviewed and interpreted by me.    8/1/2023 7:58 PM     HISTORY/REASON FOR EXAM:  Right sided weakness, history of seizures     TECHNIQUE/EXAM DESCRIPTION:     T1 sagittal, T2 axial, flair coronal, T1 coronal, and diffusion-weighted axial images were obtained of the brain.     COMPARISON: CT brain, CTA 8/1/2023     FINDINGS:     An acute infarct is noted in the left medial frontal region in the posterior left pericallosal artery distribution. Remote right frontal opercular infarct with encephalomalacia noted.  Nonspecific T2 hyperintensities are noted in the periventricular and deep white matter, most likely related to chronic microvascular ischemia.     Age-related volume loss noted with prominent sulci, cisterns and ventricles. Ventricular dilatation is proportionate to the  degree of cerebral volume loss.  There is no evidence of intracranial mass or mass effect. No evidence of midline shift.  There are no extra axial collections.  Visualized intracranial arterial flow voids are within normal limits.  Bone marrow signal in the calvarium is within normal limits.  Included portions of the paranasal sinuses are within normal limits.  Included portions of the mastoid air cells are within normal limits.  Included portions of the orbits are within normal limits     IMPRESSION:        Acute infarct in the left medial frontal region in the left posterior CAROL ANN distribution.     Remote right frontal infarct with encephalomalacia.     Age-related volume loss.              LABS:  Recent Labs     08/02/23  0341   SODIUM 141   POTASSIUM 3.8   CHLORIDE 107   CO2 21   GLUCOSE 100*   BUN 12   CREATININE 0.62   CALCIUM 9.3     Recent Labs     08/02/23  0341 08/03/23  0623 08/04/23  0348   WBC 7.2 6.6 8.0   RBC 5.12 5.36 5.56*   HEMOGLOBIN 15.2 16.1* 16.7*   HEMATOCRIT 46.0 47.8* 49.9*   MCV 89.8 89.2 89.7   MCH 29.7 30.0 30.0   MCHC 33.0 33.7 33.5   RDW 41.4 41.1 41.7   PLATELETCT 168 189 199   MPV 9.9 9.9 10.0         PRIMARY REHAB DIAGNOSIS:    This patient is a 67 y.o. female admitted for acute inpatient rehabilitation with Ischemic stroke (HCC).    IMPAIRMENTS:   ADLs/IADLs  Mobility    SECONDARY DIAGNOSIS/MEDICAL CO-MORBIDITIES AFFECTING FUNCTION:    Seizure disorder  Tremor  Hypertension  Hyperlipidemia    RELEVANT CHANGES SINCE PREADMISSION EVALUATION:    Status unchanged    The patient's rehabilitation potential is Excellent  The patient's medical prognosis is excellent    PLAN:   Discussion and Recommendations, discussed with the patient and/or family:   1. The patient requires an acute inpatient rehabilitation program with a coordinated program of care at an intensity and frequency not available at a lower level of care. This recommendation is substantiated by the patient's medical physicians  who recommend that the patient's intervention and assessment of medical issues needs to be done at an acute level of care for patient's safety and maximum outcome.     2. A coordinated program of care will be supplied by an interdisciplinary team of physical therapy, occupational therapy, rehab physician, rehab nursing, and, if needed, speech therapy and rehab psychology. Rehab team presents a patient-specific rehabilitation and education program concentrating on prevention of future problems related to accessibility, mobility, skin, bowel, bladder, sexuality, and psychosocial and medical/surgical problems.     3. Need for Rehabilitation Physician: The rehab physician will be evaluating the patient on a multi-weekly basis to help coordinate the program of care. The rehab physician communicates between medical physicians, therapists, and nurses to maximize the patient's potential outcome. Specific areas in which the rehab physician will be providing daily assessment include the following:   A. Assessing the patient's heart rate and blood pressure response (vitals monitoring) to activity and making adjustments in medications or conservative measures as needed.   B. The rehab physician will be assessing the frequency at which the program can be increased to allow the patient to reach optimal functional outcome.   C. The rehab physician will also provide assessments in daily skin care, especially in light of patient's impairments in mobility.   D. The rehab physician will provide special expertise in understanding how to work with functional impairment and recommend appropriate interventions, compensatory techniques, and education that will facilitate the patient's outcome.     4. Rehab R.N.   The rehab RN will be working with patient to carry over in room mobility and activities of daily living when the patient is not in 3 hours of skilled therapy. Rehab nursing will be working in conjunction with rehab physician to  address all the medical issues above and continue to assess laboratory work and discuss abnormalities with the treating physicians, assess vitals, and response to activity, and discuss and report abnormalities with the rehab physician. Rehab RN will also continue daily skin care, supervise bladder/bowel program, instruct in medication administration, and ensure patient safety.     5. Therapies to treat at intensity and frequency of (may change after completion of evaluation by all therapeutic disciplines):       PT:  Physical therapy to address mobility, transfer, gait training and evaluation for adaptive equipment needs 1hour/day at least 5 days/week for the duration of the ELOS (see below)       OT:  Occupational therapy to address ADLs, self-care, home management training, functional mobility/transfers and assistive device evaluation, and community re-integration 1hour/day at least 5 days/week for the duration of the ELOS (see below).        ST/Dysphagia:  Speech therapy to address speech, language, and cognitive deficits as well as swallowing difficulties with retraining/dysphagia management and community re-integration with comprehension, expression, cognitive training 1hour/day at least 5 days/week for the duration of the ELOS (see below).     6. Medical management / Rehabilitation Issues/Adverse Potential affecting function as part of rehabilitation plan.    Seizure disorder  Last seizure November 2022  Generalized tonic-clonic  Outpatient follow-up with a provider in [place]  Continue lamotrigine    Tremor  After her last stroke  Consider referral to movement disorder clinic if this is interfering with her function    Hypertension  Continue losartan  As needed hydralazine  Consult hospitalist    Hyperlipidemia  Continue atorvastatin    I performed a complete drug regimen review and did not identify any potential clinically significant medication issues.    The patient's CODE STATUS was confirmed as FULL  CODE on admission, with the patient and/or family at bedside.    REHABILITATION ISSUES/ADVERSE POTENTIAL:  1.  CVA (Cerebrovascular Accident): Continue Eliquis for secondary prophylaxis as well as lipid and blood pressure management. Patient demonstrates functional deficits in strength, balance, coordination, and ADL's. Patient is admitted to [facility] for comprehensive rehabilitation therapy as described below.   Rehabilitation nursing monitors bowel and bladder control, educates on medication administration, co-morbidities and monitors patient safety.    2.  DVT prophylaxis:  Patient is on Eliquis for anticoagulation upon transfer. Encourage OOB. Monitor daily for signs and symptoms of DVT including but not limited to swelling and pain to prevent the development of DVT that may interfere with therapies.    3.  Pain: No issues with pain currently / Controlled with as needed oral analgesics.    4.  Nutrition/Dysphagia: Dietician monitors nutrient intake, recommend supplements prn and provide nutrition education to pt/family to promote optimal nutrition for wound healing/recovery.     5.  Bladder/bowel:  Start bowel and bladder program, to prevent constipation, urinary retention (which may lead to UTI), and urinary incontinence (which will impact upon pt's functional independence).   - TV Q3h while awake with post void bladder scans, I&O cath for PVRs >400  - up to commode after meal     6.  Skin/dermal ulcer prophylaxis: Monitor for new skin conditions with q.2 h. turns as required to prevent the development of skin breakdown.     7.  GI prophylaxis: Omeprazole to prevent gastritis or GI bleed that would interfere with therapies.    8. Respiratory therapy: RT performs O2 management prn, breathing retraining, pulmonary hygiene and bronchospasm management prn to optimize participation in therapies.    Pt was seen today for 75 min, and entire time spent in face-to-face contact was >50% in  counseling and coordination of care as detailed in A/P above.        GOALS/EXPECTED LEVEL OF FUNCTION BASED ON CURRENT MEDICAL AND FUNCTIONAL STATUS (may change based on patient's medical status and rate of impairment recovery):  Transfers:   Supervision  Mobility/Gait:   Supervision  ADL's:   Supervision    DISPOSITION: Discharge to pre-morbid independent living setting with the supportive care of patient's signigicant other.      ELOS: 14 days    Leona Pleitez M.D.  Physical Medicine and Rehabilitation

## 2023-08-06 PROCEDURE — 700102 HCHG RX REV CODE 250 W/ 637 OVERRIDE(OP): Performed by: PHYSICAL MEDICINE & REHABILITATION

## 2023-08-06 PROCEDURE — 770010 HCHG ROOM/CARE - REHAB SEMI PRIVAT*

## 2023-08-06 PROCEDURE — 99232 SBSQ HOSP IP/OBS MODERATE 35: CPT | Performed by: PHYSICAL MEDICINE & REHABILITATION

## 2023-08-06 PROCEDURE — A9270 NON-COVERED ITEM OR SERVICE: HCPCS | Performed by: PHYSICAL MEDICINE & REHABILITATION

## 2023-08-06 RX ADMIN — ATORVASTATIN CALCIUM 80 MG: 40 TABLET, FILM COATED ORAL at 20:54

## 2023-08-06 RX ADMIN — APIXABAN 5 MG: 5 TABLET, FILM COATED ORAL at 20:55

## 2023-08-06 RX ADMIN — LAMOTRIGINE 150 MG: 100 TABLET ORAL at 20:55

## 2023-08-06 RX ADMIN — OMEPRAZOLE 20 MG: 20 CAPSULE, DELAYED RELEASE ORAL at 08:25

## 2023-08-06 RX ADMIN — APIXABAN 5 MG: 5 TABLET, FILM COATED ORAL at 08:25

## 2023-08-06 RX ADMIN — LOSARTAN POTASSIUM 25 MG: 25 TABLET, FILM COATED ORAL at 05:46

## 2023-08-06 SDOH — ECONOMIC STABILITY: TRANSPORTATION INSECURITY
IN THE PAST 12 MONTHS, HAS THE LACK OF TRANSPORTATION KEPT YOU FROM MEDICAL APPOINTMENTS OR FROM GETTING MEDICATIONS?: NO

## 2023-08-06 SDOH — ECONOMIC STABILITY: TRANSPORTATION INSECURITY
IN THE PAST 12 MONTHS, HAS LACK OF RELIABLE TRANSPORTATION KEPT YOU FROM MEDICAL APPOINTMENTS, MEETINGS, WORK OR FROM GETTING THINGS NEEDED FOR DAILY LIVING?: NO

## 2023-08-06 NOTE — ASSESSMENT & PLAN NOTE
H/O CVA  Now w/ R HP  Not TPA or thrombectomy candidate  Has ZioPatch  On Eliquis and Lipitor  Management per Physiatry

## 2023-08-06 NOTE — PROGRESS NOTES
Physical Medicine & Rehabilitation Progress Note  Encounter Date: 8/5/2023    Chief Complaint:   Stroke    Interval Events (Subjective):  No acute events overnight    Objective:  VITAL SIGNS: VITAL SIGNS:   Vitals:    08/05/23 0602 08/05/23 0705 08/05/23 1510 08/05/23 1902   BP: 139/88 137/85 114/74 132/78   Pulse: 70 69 84 92   Resp:  16 16 18   Temp:  36.4 °C (97.6 °F) 36.7 °C (98.1 °F) 36.5 °C (97.7 °F)   TempSrc:  Temporal Temporal Oral   SpO2:  94% 95% 96%         Gen: NAD  Psych: Mood and affect appropriate  Resp: CTAB, no upper airway sounds  Abd: NTND  Neuro: Alert and oriented x3    Laboratory Values:  Recent Results (from the past 72 hour(s))   CBC WITH DIFFERENTIAL    Collection Time: 08/03/23  6:23 AM   Result Value Ref Range    WBC 6.6 4.8 - 10.8 K/uL    RBC 5.36 4.20 - 5.40 M/uL    Hemoglobin 16.1 (H) 12.0 - 16.0 g/dL    Hematocrit 47.8 (H) 37.0 - 47.0 %    MCV 89.2 81.4 - 97.8 fL    MCH 30.0 27.0 - 33.0 pg    MCHC 33.7 32.2 - 35.5 g/dL    RDW 41.1 35.9 - 50.0 fL    Platelet Count 189 164 - 446 K/uL    MPV 9.9 9.0 - 12.9 fL    Neutrophils-Polys 61.60 44.00 - 72.00 %    Lymphocytes 26.70 22.00 - 41.00 %    Monocytes 8.60 0.00 - 13.40 %    Eosinophils 2.00 0.00 - 6.90 %    Basophils 0.50 0.00 - 1.80 %    Immature Granulocytes 0.60 0.00 - 0.90 %    Nucleated RBC 0.00 0.00 - 0.20 /100 WBC    Neutrophils (Absolute) 4.08 1.82 - 7.42 K/uL    Lymphs (Absolute) 1.77 1.00 - 4.80 K/uL    Monos (Absolute) 0.57 0.00 - 0.85 K/uL    Eos (Absolute) 0.13 0.00 - 0.51 K/uL    Baso (Absolute) 0.03 0.00 - 0.12 K/uL    Immature Granulocytes (abs) 0.04 0.00 - 0.11 K/uL    NRBC (Absolute) 0.00 K/uL   EC-ECHOCARDIOGRAM COMPLETE W/O CONT    Collection Time: 08/03/23  3:50 PM   Result Value Ref Range    Eject.Frac. MOD BP 63.89     Eject.Frac. MOD 4C 68.7     Eject.Frac. MOD 2C 58.36    CBC WITH DIFFERENTIAL    Collection Time: 08/04/23  3:48 AM   Result Value Ref Range    WBC 8.0 4.8 - 10.8 K/uL    RBC 5.56 (H) 4.20 - 5.40  M/uL    Hemoglobin 16.7 (H) 12.0 - 16.0 g/dL    Hematocrit 49.9 (H) 37.0 - 47.0 %    MCV 89.7 81.4 - 97.8 fL    MCH 30.0 27.0 - 33.0 pg    MCHC 33.5 32.2 - 35.5 g/dL    RDW 41.7 35.9 - 50.0 fL    Platelet Count 199 164 - 446 K/uL    MPV 10.0 9.0 - 12.9 fL    Neutrophils-Polys 57.70 44.00 - 72.00 %    Lymphocytes 29.80 22.00 - 41.00 %    Monocytes 9.30 0.00 - 13.40 %    Eosinophils 2.10 0.00 - 6.90 %    Basophils 0.60 0.00 - 1.80 %    Immature Granulocytes 0.50 0.00 - 0.90 %    Nucleated RBC 0.00 0.00 - 0.20 /100 WBC    Neutrophils (Absolute) 4.63 1.82 - 7.42 K/uL    Lymphs (Absolute) 2.39 1.00 - 4.80 K/uL    Monos (Absolute) 0.75 0.00 - 0.85 K/uL    Eos (Absolute) 0.17 0.00 - 0.51 K/uL    Baso (Absolute) 0.05 0.00 - 0.12 K/uL    Immature Granulocytes (abs) 0.04 0.00 - 0.11 K/uL    NRBC (Absolute) 0.00 K/uL   CBC with Differential    Collection Time: 08/05/23  6:00 AM   Result Value Ref Range    WBC 8.7 4.8 - 10.8 K/uL    RBC 5.57 (H) 4.20 - 5.40 M/uL    Hemoglobin 16.5 (H) 12.0 - 16.0 g/dL    Hematocrit 49.2 (H) 37.0 - 47.0 %    MCV 88.3 81.4 - 97.8 fL    MCH 29.6 27.0 - 33.0 pg    MCHC 33.5 32.2 - 35.5 g/dL    RDW 42.3 35.9 - 50.0 fL    Platelet Count 220 164 - 446 K/uL    MPV 10.2 9.0 - 12.9 fL    Neutrophils-Polys 68.40 44.00 - 72.00 %    Lymphocytes 20.30 (L) 22.00 - 41.00 %    Monocytes 8.60 0.00 - 13.40 %    Eosinophils 1.60 0.00 - 6.90 %    Basophils 0.60 0.00 - 1.80 %    Immature Granulocytes 0.50 0.00 - 0.90 %    Nucleated RBC 0.00 0.00 - 0.20 /100 WBC    Neutrophils (Absolute) 5.92 1.82 - 7.42 K/uL    Lymphs (Absolute) 1.76 1.00 - 4.80 K/uL    Monos (Absolute) 0.74 0.00 - 0.85 K/uL    Eos (Absolute) 0.14 0.00 - 0.51 K/uL    Baso (Absolute) 0.05 0.00 - 0.12 K/uL    Immature Granulocytes (abs) 0.04 0.00 - 0.11 K/uL    NRBC (Absolute) 0.00 K/uL   Comp Metabolic Panel (CMP)    Collection Time: 08/05/23  6:00 AM   Result Value Ref Range    Sodium 141 135 - 145 mmol/L    Potassium 3.9 3.6 - 5.5 mmol/L     Chloride 106 96 - 112 mmol/L    Co2 23 20 - 33 mmol/L    Anion Gap 12.0 7.0 - 16.0    Glucose 129 (H) 65 - 99 mg/dL    Bun 15 8 - 22 mg/dL    Creatinine 0.74 0.50 - 1.40 mg/dL    Calcium 9.4 8.5 - 10.5 mg/dL    Correct Calcium 9.2 8.5 - 10.5 mg/dL    AST(SGOT) 22 12 - 45 U/L    ALT(SGPT) 22 2 - 50 U/L    Alkaline Phosphatase 90 30 - 99 U/L    Total Bilirubin 0.5 0.1 - 1.5 mg/dL    Albumin 4.3 3.2 - 4.9 g/dL    Total Protein 6.8 6.0 - 8.2 g/dL    Globulin 2.5 1.9 - 3.5 g/dL    A-G Ratio 1.7 g/dL   Magnesium    Collection Time: 08/05/23  6:00 AM   Result Value Ref Range    Magnesium 1.8 1.5 - 2.5 mg/dL   ESTIMATED GFR    Collection Time: 08/05/23  6:00 AM   Result Value Ref Range    GFR (CKD-EPI) 89 >60 mL/min/1.73 m 2       Medications:  Scheduled Medications   Medication Dose Frequency    senna-docusate  2 Tablet BID    omeprazole  20 mg QAM AC    apixaban  5 mg BID    atorvastatin  80 mg Q EVENING    lamoTRIgine  150 mg Q EVENING    losartan  25 mg Q DAY     PRN medications: hydrOXYzine HCl, melatonin, Respiratory Therapy Consult, acetaminophen, lactulose, docusate sodium, carboxymethylcellulose, benzocaine-menthol, mag hydrox-al hydrox-simeth, ondansetron **OR** ondansetron, traZODone, sodium chloride, midazolam, senna-docusate **AND** polyethylene glycol/lytes **AND** magnesium hydroxide **AND** bisacodyl, hydrALAZINE, hydrALAZINE    Bowel:  Last Documented BM Date:   Last Documented BM Description:      Bladder:  Last Documented Urine Void (mL):    Last Documented Bladder Scan: Post Void  Last Documented Bladder Scan Results (mL): 58    Physical Therapy:  Bed Mobility    Transfers Moderate Assist  Increased time;Initial preparation for task;Squat pivot transfer to wheelchair;Supervision for safety;Verbal cueing (SQPT to L bed>WC)   Mobility Total Assist X 2 (mod-maxA with WC follow)   Stairs    Barriers to Discharge Home:      Occupational Therapy:  Grooming Minimal Assist   Bathing Maximal Assist   UB Dressing  Minimal Assist   LB Dressing Maximal Assist   Toileting     Shower & Transfer Maximal Assist   Barriers to Discharge Home:    Diet:  Current Diet Order   Procedures    Diet Order Diet: Regular; Tray Modifications (optional): SLP - Deliver to Nursing Station     Medical Decision Making and Plan:    Stroke  Continue secondary prophylaxis  Comprehensive inpatient rehabilitation with physical therapy, Occupational Therapy, speech therapy    Seizures  Last known seizure November 2022  Continue lamotrigine  Continue follow-up with neurology in [place]    Hypertension  Appreciate hospitalist consult    Hyperlipidemia  Continue atorvastatin    DVT prophylaxis continue Eliquis    GI prophylaxis continue omeprazole      _____________________________________    Lloyd Flowers MD  [facility] - Physical Medicine & Rehabilitation     _____________________________________

## 2023-08-06 NOTE — DISCHARGE PLANNING
CASE MANAGEMENT INITIAL ASSESSMENT    Admit Date:  8/4/2023     Case Management has reviewed the medical chart and will meet with patient to discuss role of case management / discharge planning / team conference.     Patient is a  67 y.o. female transferred from [facility].    Attending physician: Dr. Pleitez   PCP: Dr. Marie     Diagnosis: Ischemic stroke ([facility]) [I63.9]    Co-morbidities:   Patient Active Problem List    Diagnosis Date Noted    Ischemic stroke (HCC) 08/04/2023    Grade I diastolic dysfunction 08/04/2023    Tremor 08/04/2023    Neuropathy 08/04/2023    HTN (hypertension) 08/04/2023    Acute CVA (cerebrovascular accident) ([facility]) 08/01/2023    Thrombocytopenia ([facility]) 08/01/2023    Acute ischemic stroke - right frontal lobe 06/05/2016    Aphasia due to stroke 06/05/2016    Right arm weakness 06/05/2016    Seizure disorder ([facility]) 08/23/2012    Other hyperlipidemia 08/23/2012     Prior Living Situation:  Housing / Facility: 2 Story House  Lives with - Patient's Self Care Capacity: Alone and Able to Care For Self    Prior Level of Function:  Medication Management: Independent  Finances: Independent  Home Management: Independent  Shopping: Independent  Prior Level Of Mobility: Independent Without Device in Community  Driving / Transportation: Driving Independent    Support Systems:  Primary : Rosanne Hernandez (daughter in law)    Previous Services Utilized:   Equipment Owned: None  Prior Services: None    Other Information:  Occupation (Pre-Hospital Vocational): Retired Due To Age  Primary Payor Source: Private Insurance ([facility])  Primary Care Practitioner : Misa Marie MD    Plan:  1. Continue to follow patient through hospitalization and provide discharge planning in collaboration with patient, family, physicians and ancillary services.     2. Utilize community resources to ensure a safe discharge.

## 2023-08-06 NOTE — PROGRESS NOTES
NURSING DAILY NOTE    Name: Doreen Noe   Date of Admission: 8/4/2023   Admitting Diagnosis: Ischemic stroke (HCC)  Attending Physician: Leona Pleitez M.d.  Allergies: Patient has no known allergies.    Safety  Patient Assist  Mod A  Patient Precautions  Fall Risk  Precaution Comments  R hemiparesis, R PRAFO, mild R neglect, hx of seizures and L side CVA, Ziopatch  Bed Transfer Status  Moderate Assist  Toilet Transfer Status      Assistive Devices  Rails, Wheelchair  Oxygen  None - Room Air  Diet/Therapeutic Dining  Current Diet Order   Procedures    Diet Order Diet: Regular; Tray Modifications (optional): SLP - Deliver to Nursing Station     Pill Administration  whole  Agitated Behavioral Scale     ABS Level of Severity       Fall Risk  Has the patient had a fall this admission?   No  Mariela Rodgers Fall Risk Scoring  13, MODERATE RISK  Fall Risk Safety Measures  bed alarm, chair alarm, poor balance, and low vision/ hearing    Vitals  Temperature: 36.5 °C (97.7 °F)  Temp src: Oral  Pulse: 92  Respiration: 18  Blood Pressure : 132/78  Blood Pressure MAP (Calculated): 96 MM HG  BP Location: Left, Upper Arm  Patient BP Position: Supine     Oxygen  Pulse Oximetry: 96 %  O2 (LPM): 0  O2 Delivery Device: None - Room Air    Bowel and Bladder  Last Bowel Movement  08/04/23 (per report)  Stool Type     Bowel Device     Continent  Bladder: Continent void   Bowel: Continent movement  Bladder Function  Number of Times Voided: 1  Urine Color: Yellow  Genitourinary Assessment   Urine Color: Yellow  Bladder Scan: Post Void  $ Bladder Scan Results (mL): 13    Skin  Angus Score   18  Sensory Interventions   Bed Types: Standard/Trauma Mattress with Overlay  Skin Preventative Measures: Pillows in Use for Support / Positioning  Moisture Interventions  Moisturizers/Barriers: Barrier Wipes      Pain  Pain Rating Scale  0 - No Pain  Pain Location  Neck  Pain Location  Orientation     Pain Interventions   Declines    ADLs    Bathing      Linen Change      Personal Hygiene     Chlorhexidine Bath      Oral Care  Brushed Teeth  Teeth/Dentures     Shave     Nutrition Percentage Eaten     Environmental Precautions     Patient Turns/Positioning  Patient Turns Self from Side to Side  Patient Turns Assistance/Tolerance     Bed Positions     Head of Bed Elevated         Psychosocial/Neurologic Assessment  Psychosocial Assessment  Psychosocial (WDL):  Within Defined Limits  Neurologic Assessment  Neuro (WDL): Exceptions to WDL  Level of Consciousness: Alert  Orientation Level: Oriented X4  Cognition: Appropriate judgement, Appropriate safety awareness, Appropriate attention/concentration, Appropriate for developmental age, Follows commands  Speech: Clear  Pupil Assesment: No  Motor Function/Sensation Assessment: Dorsiflexion, Motor response, Sensation, Motor strength  R Foot Dorsiflexion: Weak  L Foot Dorsiflexion: Strong  RUE Motor Response: Responds to commands  RUE Sensation: Full sensation  Muscle Strength Right Arm: Good Strength Against Gravity and Moderate Resistance  LUE Motor Response: Tremors, Responds to commands  LUE Sensation: Full sensation  Muscle Strength Left Arm: Normal Strength Against Gravity and Full Resistance  RLE Motor Response: Responds to commands  RLE Sensation: Numbness  Muscle Strength Right Leg: Fair Strength against Gravity but No Resistance  LLE Motor Response: Responds to commands  LLE Sensation: Full sensation  Muscle Strength Left Leg: Normal Strength Against Gravity and Full Resistance  EENT (WDL):  WDL Except    Cardio/Pulmonary Assessment  Edema      Respiratory Breath Sounds  RUL Breath Sounds: Clear  RML Breath Sounds: Clear  RLL Breath Sounds: Clear  MONIKA Breath Sounds: Clear  LLL Breath Sounds: Clear  Cardiac Assessment   Cardiac (WDL):  WDL Except

## 2023-08-06 NOTE — CARE PLAN
"  Problem: Fall Risk - Rehab  Goal: Patient will remain free from falls  Note: Mariela Rodgers Fall risk Assessment Score: 13      Moderate fall risk Interventions  - Bed and strip alarm   - Yellow sign by the door   - Yellow wrist band \"Fall risk\"  - Room near to the nurse station  - Do not leave patient unattended in the bathroom  - Fall risk education provided           The patient is Stable - Low risk of patient condition declining or worsening    Shift Goals  Clinical Goals: safety  Patient Goals: safety        "

## 2023-08-06 NOTE — CONSULTS
HOSPITAL MEDICINE CONSULTATION    Requesting Physician:  Dr. Pleitez    Reason for Consult:  Hypertension    History of Present Illness:  The patient is a 67-year-old  female with past medical history significant for cerebrovascular accident, hypertension, and seizure disorder.  She was admitted to [facility] on 8/1/23 for right sided weakness.  MR imaging revealed acute infarct in the left medial frontal region in the left posterior CAROL ANN distribution and remote right frontal infarct with encephalomalacia.  CT angiogram of the head and neck demonstrated occlusion of distal left pericallosal artery and moderate size chronic area of infarction in right anterior frontal lobe.  Echocardiogram showed ejection fraction 64% and grade I diastolic dysfunction.  The patient was not a TPA or thrombectomy candidate, and was placed on Eliquis and Lipitor for secondary stroke prevention.  A ZioPatch has also been placed for remote cardiac monitoring.  Due to her ongoing functional debility, the patient was transferred to [facility] on 8/4/23.  Hospital Medicine consultation is requested to assist in the management of this patient's high blood pressure.    Review of Systems:  Review of Systems   Constitutional:  Negative for chills and fever.   HENT: Negative.     Eyes: Negative.    Respiratory:  Negative for cough and shortness of breath.    Cardiovascular:  Negative for chest pain and palpitations.   Gastrointestinal:  Negative for abdominal pain, nausea and vomiting.   Genitourinary: Negative.    Musculoskeletal: Negative.    Skin:  Negative for itching and rash.   Neurological:  Positive for focal weakness.   Endo/Heme/Allergies:  Negative for polydipsia. Does not bruise/bleed easily.   All other systems reviewed and are negative.      Allergies:  No Known Allergies    Medications:    Current Facility-Administered Medications:     hydrOXYzine HCl (Atarax) tablet 50 mg, 50 mg,  Oral, Q6HRS PRN, Leona Pleitez M.D.    melatonin tablet 3 mg, 3 mg, Oral, HS PRN, Leona Pleitez M.D.    Respiratory Therapy Consult, , Nebulization, Continuous RT, Leona Pleitez M.D.    acetaminophen (Tylenol) tablet 650 mg, 650 mg, Oral, Q4HRS PRN, Leona Pleitez M.D.    lactulose 20 GM/30ML solution 30 mL, 30 mL, Oral, QDAY PRN, Leona Pleitez M.D.    docusate sodium (Enemeez) enema 283 mg, 283 mg, Rectal, QDAY PRN, Leona Pleitez M.D.    carboxymethylcellulose (Refresh Tears) 0.5 % ophthalmic drops 1 Drop, 1 Drop, Both Eyes, PRN, Leona Pleitez M.D.    benzocaine-menthol (Cepacol) lozenge 1 Lozenge, 1 Lozenge, Mouth/Throat, Q2HRS PRN, Leona Pelitez M.D.    mag hydrox-al hydrox-simeth (Maalox Plus Es Or Mylanta Ds) suspension 20 mL, 20 mL, Oral, Q2HRS PRN, Leona Pleitez M.D.    ondansetron (Zofran ODT) dispertab 4 mg, 4 mg, Oral, 4X/DAY PRN **OR** ondansetron (Zofran) syringe/vial injection 4 mg, 4 mg, Intramuscular, 4X/DAY PRN, Leona Pleitez M.D.    traZODone (Desyrel) tablet 50 mg, 50 mg, Oral, QHS PRN, Leona Pleitez M.D.    sodium chloride (Ocean) 0.65 % nasal spray 2 Spray, 2 Spray, Nasal, PRN, Leona Pleitez M.D.    midazolam (VERSED) 5 mg/mL (1 mL vial), 5 mg, Nasal, PRN, Leona Pleitez M.D.    senna-docusate (Pericolace Or Senokot S) 8.6-50 MG per tablet 2 Tablet, 2 Tablet, Oral, BID, 2 Tablet at 08/05/23 1006 **AND** polyethylene glycol/lytes (Miralax) PACKET 1 Packet, 1 Packet, Oral, QDAY PRN **AND** magnesium hydroxide (Milk Of Magnesia) suspension 30 mL, 30 mL, Oral, QDAY PRN **AND** bisacodyl (Dulcolax) suppository 10 mg, 10 mg, Rectal, QDAY PRN, Leona Pleitez M.D.    omeprazole (PriLOSEC) capsule 20 mg, 20 mg, Oral, QAM AC, Leona Pleitez M.D., 20 mg at 08/05/23 1006    apixaban (Eliquis) tablet 5 mg, 5 mg, Oral, BID, Leona Pleitez M.D., 5 mg at 08/05/23 1006    atorvastatin (Lipitor) tablet 80 mg, 80 mg, Oral, Q EVENING, Leona  FABIAN Pleitez M.D., 80 mg at 08/04/23 2030    lamoTRIgine (LaMICtal) tablet 150 mg, 150 mg, Oral, Q EVENING, Leona Pleitez M.D., 150 mg at 08/04/23 2030    losartan (Cozaar) tablet 25 mg, 25 mg, Oral, Q DAY, Leona Pleitez M.D., 25 mg at 08/05/23 0605    hydrALAZINE (Apresoline) tablet 10 mg, 10 mg, Oral, Q8HRS PRN, Leona Pleitez M.D.    hydrALAZINE (Apresoline) tablet 25 mg, 25 mg, Oral, Q8HRS PRN, Leona Pleitez M.D.    Past Medical/Surgical History:  Past Medical History:   Diagnosis Date    HTN (hypertension) 08/04/2023    Hyperlipidemia     Muscle disorder     Neuropathy (HCC)     from right shoulder injury    Seizure disorder (HCC)     10/25/2019     Stroke (HCC)     01/28/2019     Substance abuse (HCC)     Thrombocytopenia (HCC) 08/01/2023    Tremor      Past Surgical History:   Procedure Laterality Date    RI COLONOSCOPY,DIAGNOSTIC N/A 9/28/2021    Procedure: COLONOSCOPY;  Surgeon: Nir Hemphill M.D.;  Location: SURGERY [facility];  Service: Gastroenterology    RI NEUROPLASTY & OR TRANSPOS MEDIAN NRV CARPAL KEVIN Left 1/21/2020    Procedure: Left carpal tunnel release.;  Surgeon: Nir Marin M.D.;  Location: SURGERY [facility];  Service: Orthopedics       Social History:  Social History     Socioeconomic History    Marital status:      Spouse name: Not on file    Number of children: Not on file    Years of education: Not on file    Highest education level: Not on file   Occupational History    Not on file   Tobacco Use    Smoking status: Never    Smokeless tobacco: Never   Vaping Use    Vaping Use: Never used   Substance and Sexual Activity    Alcohol use: Yes    Drug use: Yes     Types: Inhaled     Comment: Marijuana    Sexual activity: Not on file   Other Topics Concern    Not on file   Social History Narrative    Not on file     Social Determinants of Health     Financial Resource Strain: Not on file   Food Insecurity: Not on file   Transportation Needs: Not on file   Physical  Activity: Not on file   Stress: Not on file   Social Connections: Not on file   Intimate Partner Violence: Not on file   Housing Stability: Not on file       Family History:  Family History   Problem Relation Age of Onset    Hypertension Mother     Dementia Mother     Heart Failure Mother     Heart Failure Father     Heart Attack Father     Kidney Disease Father     Hypertension Father        Physical Examination:   Vitals:    08/05/23 0601 08/05/23 0602 08/05/23 0705 08/05/23 1510   BP: (!) 155/99 139/88 137/85 114/74   Pulse: 70 70 69 84   Resp:   16 16   Temp:   36.4 °C (97.6 °F) 36.7 °C (98.1 °F)   TempSrc:   Temporal Temporal   SpO2:   94% 95%       Physical Exam  Vitals reviewed.   Constitutional:       General: She is not in acute distress.     Appearance: Normal appearance. She is not ill-appearing.   HENT:      Head: Normocephalic and atraumatic.      Right Ear: External ear normal.      Left Ear: External ear normal.      Nose: Nose normal.      Mouth/Throat:      Pharynx: Oropharynx is clear.   Eyes:      General:         Right eye: No discharge.         Left eye: No discharge.      Extraocular Movements: Extraocular movements intact.      Conjunctiva/sclera: Conjunctivae normal.   Cardiovascular:      Rate and Rhythm: Normal rate and regular rhythm.   Pulmonary:      Effort: Pulmonary effort is normal. No respiratory distress.      Breath sounds: Normal breath sounds. No wheezing.   Abdominal:      General: Bowel sounds are normal. There is no distension.      Palpations: Abdomen is soft.      Tenderness: There is no abdominal tenderness. There is no guarding or rebound.   Musculoskeletal:      Cervical back: Normal range of motion and neck supple.      Right lower leg: No edema.      Left lower leg: No edema.   Skin:     General: Skin is warm and dry.   Neurological:      Mental Status: She is alert and oriented to person, place, and time.         Laboratory Data:  Recent Labs     08/03/23  0623  08/04/23  0348 08/05/23  0600   WBC 6.6 8.0 8.7   RBC 5.36 5.56* 5.57*   HEMOGLOBIN 16.1* 16.7* 16.5*   HEMATOCRIT 47.8* 49.9* 49.2*   MCV 89.2 89.7 88.3   MCH 30.0 30.0 29.6   MCHC 33.7 33.5 33.5   RDW 41.1 41.7 42.3   PLATELETCT 189 199 220   MPV 9.9 10.0 10.2     Recent Labs     08/05/23  0600   SODIUM 141   POTASSIUM 3.9   CHLORIDE 106   CO2 23   GLUCOSE 129*   BUN 15   CREATININE 0.74   CALCIUM 9.4       Imaging:  No orders to display       Impressions/Recommendations:  Seizure disorder (HCC)  Chronically on Lamictal  Neurology F/U    Ischemic stroke (HCC)  H/O CVA  Now w/ R HP  Not TPA or thrombectomy candidate  Has ZioPatch  On Eliquis and Lipitor  Management per Physiatry    HTN (hypertension)  Echo EF 64% w/ grade I DD  Blood pressure controlled on Losartan    Full Code    Thank you for the opportunity to assist in this patient's care.  Since patient is without acute medical issues requiring Hospitalist services at this time, we will sign off.  Please re-consult as needed.

## 2023-08-06 NOTE — PROGRESS NOTES
NURSING DAILY NOTE    Name: Doreen Noe   Date of Admission: 8/4/2023   Admitting Diagnosis: Ischemic stroke (HCC)  Attending Physician: Leona Pleitez M.d.  Allergies: Patient has no known allergies.    Safety  Patient Assist  max  Patient Precautions  Fall Risk  Precaution Comments  R hemiparesis, R PRAFO, mild R neglect, hx of seizures and L side CVA, Ziopatch  Bed Transfer Status  Moderate Assist  Toilet Transfer Status      Assistive Devices  Rails, Wheelchair  Oxygen  None - Room Air  Diet/Therapeutic Dining  Current Diet Order   Procedures    Diet Order Diet: Regular; Tray Modifications (optional): SLP - Deliver to Nursing Station     Pill Administration  whole  Agitated Behavioral Scale     ABS Level of Severity       Fall Risk  Has the patient had a fall this admission?   No  Mariela Rodgers Fall Risk Scoring  13, MODERATE RISK  Fall Risk Safety Measures  bed alarm, chair alarm, seatbelt alarm, and poor balance    Vitals  Temperature: 36.7 °C (98.1 °F)  Temp src: Temporal  Pulse: 86  Respiration: 14  Blood Pressure : (!) 124/90  Blood Pressure MAP (Calculated): 101 MM HG  BP Location: Left, Upper Arm  Patient BP Position: Sitting     Oxygen  Pulse Oximetry: 95 %  O2 (LPM): 0  O2 Delivery Device: None - Room Air    Bowel and Bladder  Last Bowel Movement  08/04/23  Stool Type     Bowel Device     Continent  Bladder: Continent void   Bowel: Continent movement  Bladder Function  Number of Times Voided: 1  Urine Color: Yellow  Genitourinary Assessment   Urine Color: Yellow  Bladder Scan: Post Void  $ Bladder Scan Results (mL): 13    Skin  Angus Score   18  Sensory Interventions   Bed Types: Standard/Trauma Mattress with Overlay  Skin Preventative Measures: Pillows in Use for Support / Positioning  Moisture Interventions  Moisturizers/Barriers: Barrier Wipes      Pain  Pain Rating Scale  0 - No Pain  Pain Location  Neck  Pain Location Orientation      Pain Interventions   Declines    ADLs    Bathing      Linen Change      Personal Hygiene     Chlorhexidine Bath      Oral Care  Brushed Teeth  Teeth/Dentures     Shave     Nutrition Percentage Eaten     Environmental Precautions     Patient Turns/Positioning  Patient Turns Self from Side to Side  Patient Turns Assistance/Tolerance     Bed Positions     Head of Bed Elevated         Psychosocial/Neurologic Assessment  Psychosocial Assessment  Psychosocial (WDL):  Within Defined Limits  Neurologic Assessment  Neuro (WDL): Exceptions to WDL  Level of Consciousness: Alert  Orientation Level: Oriented X4  Cognition: Appropriate judgement, Appropriate safety awareness, Appropriate attention/concentration, Appropriate for developmental age, Follows commands  Speech: Clear  Pupil Assesment: No  Motor Function/Sensation Assessment: Dorsiflexion, Motor response, Sensation, Motor strength  R Foot Dorsiflexion: Weak  L Foot Dorsiflexion: Strong  RUE Motor Response: Responds to commands  RUE Sensation: Full sensation  Muscle Strength Right Arm: Good Strength Against Gravity and Moderate Resistance  LUE Motor Response: Tremors, Responds to commands  LUE Sensation: Full sensation  Muscle Strength Left Arm: Normal Strength Against Gravity and Full Resistance  RLE Motor Response: Responds to commands  RLE Sensation: Numbness  Muscle Strength Right Leg: Fair Strength against Gravity but No Resistance  LLE Motor Response: Responds to commands  LLE Sensation: Full sensation  Muscle Strength Left Leg: Normal Strength Against Gravity and Full Resistance  EENT (WDL):  WDL Except    Cardio/Pulmonary Assessment  Edema      Respiratory Breath Sounds  RUL Breath Sounds: Clear  RML Breath Sounds: Clear  RLL Breath Sounds: Clear  MONIKA Breath Sounds: Clear  LLL Breath Sounds: Clear  Cardiac Assessment   Cardiac (WDL):  WDL Except

## 2023-08-07 ENCOUNTER — APPOINTMENT (OUTPATIENT)
Dept: PHYSICAL THERAPY | Facility: REHABILITATION | Age: 68
DRG: 057 | End: 2023-08-07
Attending: PHYSICAL MEDICINE & REHABILITATION
Payer: COMMERCIAL

## 2023-08-07 ENCOUNTER — APPOINTMENT (OUTPATIENT)
Dept: OCCUPATIONAL THERAPY | Facility: REHABILITATION | Age: 68
DRG: 057 | End: 2023-08-07
Attending: PHYSICAL MEDICINE & REHABILITATION
Payer: COMMERCIAL

## 2023-08-07 PROCEDURE — 97530 THERAPEUTIC ACTIVITIES: CPT

## 2023-08-07 PROCEDURE — 700102 HCHG RX REV CODE 250 W/ 637 OVERRIDE(OP): Performed by: PHYSICAL MEDICINE & REHABILITATION

## 2023-08-07 PROCEDURE — 97116 GAIT TRAINING THERAPY: CPT

## 2023-08-07 PROCEDURE — 97112 NEUROMUSCULAR REEDUCATION: CPT

## 2023-08-07 PROCEDURE — 97110 THERAPEUTIC EXERCISES: CPT

## 2023-08-07 PROCEDURE — A9270 NON-COVERED ITEM OR SERVICE: HCPCS | Performed by: PHYSICAL MEDICINE & REHABILITATION

## 2023-08-07 PROCEDURE — 99232 SBSQ HOSP IP/OBS MODERATE 35: CPT | Performed by: PHYSICAL MEDICINE & REHABILITATION

## 2023-08-07 PROCEDURE — 770010 HCHG ROOM/CARE - REHAB SEMI PRIVAT*

## 2023-08-07 PROCEDURE — 97535 SELF CARE MNGMENT TRAINING: CPT

## 2023-08-07 RX ADMIN — APIXABAN 5 MG: 5 TABLET, FILM COATED ORAL at 20:18

## 2023-08-07 RX ADMIN — ATORVASTATIN CALCIUM 80 MG: 40 TABLET, FILM COATED ORAL at 20:18

## 2023-08-07 RX ADMIN — OMEPRAZOLE 20 MG: 20 CAPSULE, DELAYED RELEASE ORAL at 08:41

## 2023-08-07 RX ADMIN — LOSARTAN POTASSIUM 25 MG: 25 TABLET, FILM COATED ORAL at 05:40

## 2023-08-07 RX ADMIN — SENNOSIDES AND DOCUSATE SODIUM 1 TABLET: 50; 8.6 TABLET ORAL at 08:41

## 2023-08-07 RX ADMIN — APIXABAN 5 MG: 5 TABLET, FILM COATED ORAL at 08:41

## 2023-08-07 RX ADMIN — LAMOTRIGINE 150 MG: 100 TABLET ORAL at 20:18

## 2023-08-07 ASSESSMENT — GAIT ASSESSMENTS
GAIT LEVEL OF ASSIST: TOTAL ASSIST X 2
DEVIATION: STEP TO;DECREASED BASE OF SUPPORT;BRADYKINETIC;DECREASED HEEL STRIKE;DECREASED TOE OFF
ASSISTIVE DEVICE: FRONT WHEEL WALKER;OTHER (COMMENTS)
DISTANCE (FEET): 38

## 2023-08-07 ASSESSMENT — ACTIVITIES OF DAILY LIVING (ADL)
TOILET_TRANSFER_DESCRIPTION: GRAB BAR;INCREASED TIME;SET-UP OF EQUIPMENT;SUPERVISION FOR SAFETY;VERBAL CUEING
BED_CHAIR_WHEELCHAIR_TRANSFER_DESCRIPTION: INCREASED TIME;SET-UP OF EQUIPMENT;SQUAT PIVOT TRANSFER TO WHEELCHAIR;SUPERVISION FOR SAFETY;VERBAL CUEING
BED_CHAIR_WHEELCHAIR_TRANSFER_DESCRIPTION: ADAPTIVE EQUIPMENT;INCREASED TIME;INITIAL PREPARATION FOR TASK;SET-UP OF EQUIPMENT;SUPERVISION FOR SAFETY;VERBAL CUEING

## 2023-08-07 NOTE — THERAPY
"Occupational Therapy  Daily Treatment     Patient Name: Doreen Noe  Age:  67 y.o., Sex:  female  Medical Record #: [id number]  Today's Date: 8/7/2023     Precautions  Precautions: (P) Fall Risk  Comments: (P) R hemiparesis, R PRAFO, mild R neglect, hx of seizures and L side CVA, Ziopatch    Subjective    Received patient in bathroom w/ CNA present. Agreeable to participate in OT.     \"It's so great to be able to take a shower!\"     Objective     08/07/23 0931   OT Charge Group   OT Self Care / ADL (Units) 4   OT Total Time Spent   OT Individual Total Time Spent (Mins) 60   Precautions   Precautions Fall Risk   Comments R hemiparesis, R PRAFO, mild R neglect, hx of seizures and L side CVA, Ziopatch   Vitals   O2 Delivery Device None - Room Air   Sleep/Wake Cycle   Sleep & Rest Awake   Functional Level of Assist   Grooming Supervision  (to wash hands while seated in w/c)   Bathing Minimal Assist  (standing balance/safety when washing buttocks in standing, completed remainder of bathing tasks while seated on fold down shower bench)   Upper Body Dressing Minimal Assist  (min A for bra, set-up for t-shirt)   Lower Body Dressing Moderate Assist  (Min to mod A to thread RLE through disposable briefs and scrub bottoms (while incorporating sitting in wc and standing w/ GB))   Tub / Shower Transfers Minimal Assist  (Min to CGA for wc <> fold down shower bench (stand pivot w/ GB))   Interdisciplinary Plan of Care Collaboration   Patient Position at End of Therapy Seated;Self Releasing Lap Belt Applied;Call Light within Reach       Assessment    Patient demo's good functional progress since initial eval; min to mod A for LB ADLs and functional txfrs vs max A at eval. Adequately incorporates RUE into functional tasks and uses LUE to assist/for thoroughness; benefits from using bath mitt to maximize RUE use. Min cues provided for functional problem solving. Patient highly motivated and actively participates to the best of her " ability.      Plan    RUE neuro re-ed, strengthening, ADLs, functional mobility, balance   Patient has tub/shower w/ curtain on first floor @ home; reports she can DC to boyfriend's home w/ his support if needed following DC from rehab    Occupational Therapy Goals (Active)       Problem: Dressing       Dates: Start:  08/05/23         Goal: STG-Within one week, patient will dress UB at a Mod I level.        Dates: Start:  08/05/23            Goal: STG-Within one week, patient will dress LB at a Min A level with AE/AD PRN.        Dates: Start:  08/05/23               Problem: Functional Transfers       Dates: Start:  08/05/23         Goal: STG-Within one week, patient will transfer to toilet at a CGA level with AE/AD PRN.        Dates: Start:  08/05/23            Goal: STG-Within one week, patient will transfer to tub/shower at a CGA level with AE/AD/DME PRN.        Dates: Start:  08/05/23               Problem: OT Long Term Goals       Dates: Start:  08/05/23         Goal: LTG-By discharge, patient will complete basic self care tasks at a SUP/Mod I level with AE/AD/DME PRN.        Dates: Start:  08/05/23            Goal: LTG-By discharge, patient will perform bathroom transfers at a SUP/Mod I level with AE/AD/DME PRN.        Dates: Start:  08/05/23            Goal: LTG-By discharge, patient will complete basic home management at a Min A to Mod I level with LRD and AE PRN.        Dates: Start:  08/05/23               Problem: Toileting       Dates: Start:  08/05/23         Goal: STG-Within one week, patient will complete toileting tasks at a CGA level with AE/AD/DME PRN.        Dates: Start:  08/05/23

## 2023-08-07 NOTE — PROGRESS NOTES
Rehab Progress Note     Date of Service: 8/7/2023  Chief Complaint: Follow-up stroke    Interval Events (Subjective)    Patient seen and examined today in her room.  She is headed to the gym to do some physical therapy.  Over the weekend she is already had some improvement in the strength in her right leg.  She is continent of bowel and bladder.  Last PVRs were 37, 58, 13.  She last moved her bowels this morning.  She denies any pain.  She is sleeping well.    Patient has no other new questions, concerns, or complaints today.     Objective:  VITAL SIGNS: /72   Pulse 80   Temp 36.6 °C (97.9 °F) (Oral)   Resp 18   SpO2 93%   Gen: alert, no apparent distress  CV: Regular rate, regular rhythm  Resp: Clear to auscultation bilaterally  Neuro: Right hemiparesis, mostly in the right leg    Recent Results (from the past 72 hour(s))   CBC with Differential    Collection Time: 08/05/23  6:00 AM   Result Value Ref Range    WBC 8.7 4.8 - 10.8 K/uL    RBC 5.57 (H) 4.20 - 5.40 M/uL    Hemoglobin 16.5 (H) 12.0 - 16.0 g/dL    Hematocrit 49.2 (H) 37.0 - 47.0 %    MCV 88.3 81.4 - 97.8 fL    MCH 29.6 27.0 - 33.0 pg    MCHC 33.5 32.2 - 35.5 g/dL    RDW 42.3 35.9 - 50.0 fL    Platelet Count 220 164 - 446 K/uL    MPV 10.2 9.0 - 12.9 fL    Neutrophils-Polys 68.40 44.00 - 72.00 %    Lymphocytes 20.30 (L) 22.00 - 41.00 %    Monocytes 8.60 0.00 - 13.40 %    Eosinophils 1.60 0.00 - 6.90 %    Basophils 0.60 0.00 - 1.80 %    Immature Granulocytes 0.50 0.00 - 0.90 %    Nucleated RBC 0.00 0.00 - 0.20 /100 WBC    Neutrophils (Absolute) 5.92 1.82 - 7.42 K/uL    Lymphs (Absolute) 1.76 1.00 - 4.80 K/uL    Monos (Absolute) 0.74 0.00 - 0.85 K/uL    Eos (Absolute) 0.14 0.00 - 0.51 K/uL    Baso (Absolute) 0.05 0.00 - 0.12 K/uL    Immature Granulocytes (abs) 0.04 0.00 - 0.11 K/uL    NRBC (Absolute) 0.00 K/uL   Comp Metabolic Panel (CMP)    Collection Time: 08/05/23  6:00 AM   Result Value Ref Range    Sodium 141 135 - 145 mmol/L    Potassium 3.9  3.6 - 5.5 mmol/L    Chloride 106 96 - 112 mmol/L    Co2 23 20 - 33 mmol/L    Anion Gap 12.0 7.0 - 16.0    Glucose 129 (H) 65 - 99 mg/dL    Bun 15 8 - 22 mg/dL    Creatinine 0.74 0.50 - 1.40 mg/dL    Calcium 9.4 8.5 - 10.5 mg/dL    Correct Calcium 9.2 8.5 - 10.5 mg/dL    AST(SGOT) 22 12 - 45 U/L    ALT(SGPT) 22 2 - 50 U/L    Alkaline Phosphatase 90 30 - 99 U/L    Total Bilirubin 0.5 0.1 - 1.5 mg/dL    Albumin 4.3 3.2 - 4.9 g/dL    Total Protein 6.8 6.0 - 8.2 g/dL    Globulin 2.5 1.9 - 3.5 g/dL    A-G Ratio 1.7 g/dL   Magnesium    Collection Time: 08/05/23  6:00 AM   Result Value Ref Range    Magnesium 1.8 1.5 - 2.5 mg/dL   ESTIMATED GFR    Collection Time: 08/05/23  6:00 AM   Result Value Ref Range    GFR (CKD-EPI) 89 >60 mL/min/1.73 m 2       Scheduled Medications   Medication Dose Frequency    senna-docusate  2 Tablet BID    omeprazole  20 mg QAM AC    apixaban  5 mg BID    atorvastatin  80 mg Q EVENING    lamoTRIgine  150 mg Q EVENING    losartan  25 mg Q DAY       Current Diet Order   Procedures    Diet Order Diet: Regular; Tray Modifications (optional): SLP - Deliver to Nursing Station       Assessment:    This patient is a 67 y.o. female admitted for acute inpatient rehabilitation with Ischemic stroke (HCC).       Problem List/Medical Decision Making and Plan:      L CAROL ANN stroke  Right hemiparesis  PT/OT, 1.5 hr each discipline, 5 days per week  [facility] therapy ordered    Continue Eliquis and atorvastatin    Seizure disorder  Last seizure November 2022  Generalized tonic-clonic  Outpatient follow-up with a provider in [place]  Continue lamotrigine     Tremor, left-sided  After her last stroke  Consider referral to movement disorder clinic if this is interfering with her function     Hypertension  Continue losartan     Hyperlipidemia  Continue atorvastatin    GI prophylaxis  Continue omeprazole while on Eliquis    DVT prophylaxis  Continue Eliquis    Leona Pleitez M.D.  Physical Medicine and  Rehabilitation

## 2023-08-07 NOTE — PROGRESS NOTES
..                                                         NURSING DAILY NOTE    Name: Doreen Noe   Date of Admission: 8/4/2023   Admitting Diagnosis: Ischemic stroke (HCC)  Attending Physician: Leona Pleitez M.d.  Allergies: Patient has no known allergies.    Safety  Patient Assist  max  Patient Precautions  Fall Risk  Precaution Comments  R hemiparesis, R PRAFO, mild R neglect, hx of seizures and L side CVA, Ziopatch  Bed Transfer Status  Moderate Assist  Toilet Transfer Status      Assistive Devices  Rails, Wheelchair  Oxygen  None - Room Air  Diet/Therapeutic Dining  Current Diet Order   Procedures    Diet Order Diet: Regular; Tray Modifications (optional): SLP - Deliver to Nursing Station     Pill Administration  whole  Agitated Behavioral Scale     ABS Level of Severity       Fall Risk  Has the patient had a fall this admission?   No  Mariela Rodgers Fall Risk Scoring  11, MODERATE RISK  Fall Risk Safety Measures  bed alarm, chair alarm, poor balance, and ok to leave pt in bathroom    Vitals  Temperature: 36.8 °C (98.3 °F)  Temp src: Oral  Pulse: 73  Respiration: 18  Blood Pressure : 110/74  Blood Pressure MAP (Calculated): 86 MM HG  BP Location: Left, Upper Arm  Patient BP Position: Sitting     Oxygen  Pulse Oximetry: 93 %  O2 (LPM): 0  O2 Delivery Device: None - Room Air    Bowel and Bladder  Last Bowel Movement  08/04/23  Stool Type     Bowel Device     Continent  Bladder: Continent void   Bowel: Continent movement  Bladder Function  Number of Times Voided: 1  Urine Color: Yellow  Genitourinary Assessment   Bladder Assessment (WDL):  Within Defined Limits  Urine Color: Yellow  Bladder Scan: Post Void  $ Bladder Scan Results (mL): 13    Skin  Angus Score   17  Sensory Interventions   Bed Types: Standard/Trauma Mattress  Skin Preventative Measures: Pillows in Use for Support / Positioning  Moisture Interventions  Moisturizers/Barriers: Barrier Wipes      Pain  Pain Rating Scale  0 - No Pain  Pain  Location  Neck  Pain Location Orientation     Pain Interventions   Declines    ADLs    Bathing      Linen Change      Personal Hygiene     Chlorhexidine Bath      Oral Care  Brushed Teeth  Teeth/Dentures     Shave     Nutrition Percentage Eaten     Environmental Precautions     Patient Turns/Positioning  Patient Turns Self from Side to Side  Patient Turns Assistance/Tolerance     Bed Positions     Head of Bed Elevated         Psychosocial/Neurologic Assessment  Psychosocial Assessment  Psychosocial (WDL):  Within Defined Limits  Neurologic Assessment  Neuro (WDL): Exceptions to WDL  Level of Consciousness: Alert  Orientation Level: Oriented X4  Cognition: Appropriate judgement, Appropriate safety awareness, Appropriate attention/concentration, Appropriate for developmental age, Follows commands  Speech: Clear  Pupil Assesment: No  Motor Function/Sensation Assessment: Dorsiflexion, Motor response, Sensation, Motor strength  R Foot Dorsiflexion: Weak  L Foot Dorsiflexion: Strong  RUE Motor Response: Responds to commands  RUE Sensation: Full sensation  Muscle Strength Right Arm: Good Strength Against Gravity and Moderate Resistance  LUE Motor Response: Tremors, Responds to commands  LUE Sensation: Full sensation  Muscle Strength Left Arm: Normal Strength Against Gravity and Full Resistance  RLE Motor Response: Responds to commands  RLE Sensation: Numbness  Muscle Strength Right Leg: Weak Movement but Not Against Gravity or Resistance  LLE Motor Response: Responds to commands  LLE Sensation: Full sensation  Muscle Strength Left Leg: Normal Strength Against Gravity and Full Resistance  EENT (WDL):  WDL Except    Cardio/Pulmonary Assessment  Edema      Respiratory Breath Sounds  RUL Breath Sounds: Clear  RML Breath Sounds: Clear  RLL Breath Sounds: Clear  MONIKA Breath Sounds: Clear  LLL Breath Sounds: Clear  Cardiac Assessment   Cardiac (WDL):  Within Defined Limits

## 2023-08-07 NOTE — PROGRESS NOTES
Physical Medicine & Rehabilitation Progress Note  Encounter Date: 8/6/2023    Chief Complaint:   Stroke    Interval Events (Subjective):  No acute events overnight. Patient happy with therapy    Objective:  VITAL SIGNS: VITAL SIGNS:   Vitals:    08/06/23 0546 08/06/23 0655 08/06/23 1325 08/06/23 1840   BP: 122/83 126/77 (!) 124/90 124/65   Pulse:  76 86 73   Resp:  12 14 18   Temp:  36.9 °C (98.4 °F) 36.7 °C (98.1 °F) 36.8 °C (98.3 °F)   TempSrc:  Temporal Temporal Oral   SpO2:  92% 95% 93%         Gen: NAD  Psych: Mood and affect appropriate  Resp: CTAB, no upper airway sounds  Abd: NTND  Neuro: Alert and oriented x3    Laboratory Values:  Recent Results (from the past 72 hour(s))   CBC WITH DIFFERENTIAL    Collection Time: 08/04/23  3:48 AM   Result Value Ref Range    WBC 8.0 4.8 - 10.8 K/uL    RBC 5.56 (H) 4.20 - 5.40 M/uL    Hemoglobin 16.7 (H) 12.0 - 16.0 g/dL    Hematocrit 49.9 (H) 37.0 - 47.0 %    MCV 89.7 81.4 - 97.8 fL    MCH 30.0 27.0 - 33.0 pg    MCHC 33.5 32.2 - 35.5 g/dL    RDW 41.7 35.9 - 50.0 fL    Platelet Count 199 164 - 446 K/uL    MPV 10.0 9.0 - 12.9 fL    Neutrophils-Polys 57.70 44.00 - 72.00 %    Lymphocytes 29.80 22.00 - 41.00 %    Monocytes 9.30 0.00 - 13.40 %    Eosinophils 2.10 0.00 - 6.90 %    Basophils 0.60 0.00 - 1.80 %    Immature Granulocytes 0.50 0.00 - 0.90 %    Nucleated RBC 0.00 0.00 - 0.20 /100 WBC    Neutrophils (Absolute) 4.63 1.82 - 7.42 K/uL    Lymphs (Absolute) 2.39 1.00 - 4.80 K/uL    Monos (Absolute) 0.75 0.00 - 0.85 K/uL    Eos (Absolute) 0.17 0.00 - 0.51 K/uL    Baso (Absolute) 0.05 0.00 - 0.12 K/uL    Immature Granulocytes (abs) 0.04 0.00 - 0.11 K/uL    NRBC (Absolute) 0.00 K/uL   CBC with Differential    Collection Time: 08/05/23  6:00 AM   Result Value Ref Range    WBC 8.7 4.8 - 10.8 K/uL    RBC 5.57 (H) 4.20 - 5.40 M/uL    Hemoglobin 16.5 (H) 12.0 - 16.0 g/dL    Hematocrit 49.2 (H) 37.0 - 47.0 %    MCV 88.3 81.4 - 97.8 fL    MCH 29.6 27.0 - 33.0 pg    MCHC 33.5 32.2  - 35.5 g/dL    RDW 42.3 35.9 - 50.0 fL    Platelet Count 220 164 - 446 K/uL    MPV 10.2 9.0 - 12.9 fL    Neutrophils-Polys 68.40 44.00 - 72.00 %    Lymphocytes 20.30 (L) 22.00 - 41.00 %    Monocytes 8.60 0.00 - 13.40 %    Eosinophils 1.60 0.00 - 6.90 %    Basophils 0.60 0.00 - 1.80 %    Immature Granulocytes 0.50 0.00 - 0.90 %    Nucleated RBC 0.00 0.00 - 0.20 /100 WBC    Neutrophils (Absolute) 5.92 1.82 - 7.42 K/uL    Lymphs (Absolute) 1.76 1.00 - 4.80 K/uL    Monos (Absolute) 0.74 0.00 - 0.85 K/uL    Eos (Absolute) 0.14 0.00 - 0.51 K/uL    Baso (Absolute) 0.05 0.00 - 0.12 K/uL    Immature Granulocytes (abs) 0.04 0.00 - 0.11 K/uL    NRBC (Absolute) 0.00 K/uL   Comp Metabolic Panel (CMP)    Collection Time: 08/05/23  6:00 AM   Result Value Ref Range    Sodium 141 135 - 145 mmol/L    Potassium 3.9 3.6 - 5.5 mmol/L    Chloride 106 96 - 112 mmol/L    Co2 23 20 - 33 mmol/L    Anion Gap 12.0 7.0 - 16.0    Glucose 129 (H) 65 - 99 mg/dL    Bun 15 8 - 22 mg/dL    Creatinine 0.74 0.50 - 1.40 mg/dL    Calcium 9.4 8.5 - 10.5 mg/dL    Correct Calcium 9.2 8.5 - 10.5 mg/dL    AST(SGOT) 22 12 - 45 U/L    ALT(SGPT) 22 2 - 50 U/L    Alkaline Phosphatase 90 30 - 99 U/L    Total Bilirubin 0.5 0.1 - 1.5 mg/dL    Albumin 4.3 3.2 - 4.9 g/dL    Total Protein 6.8 6.0 - 8.2 g/dL    Globulin 2.5 1.9 - 3.5 g/dL    A-G Ratio 1.7 g/dL   Magnesium    Collection Time: 08/05/23  6:00 AM   Result Value Ref Range    Magnesium 1.8 1.5 - 2.5 mg/dL   ESTIMATED GFR    Collection Time: 08/05/23  6:00 AM   Result Value Ref Range    GFR (CKD-EPI) 89 >60 mL/min/1.73 m 2       Medications:  Scheduled Medications   Medication Dose Frequency    senna-docusate  2 Tablet BID    omeprazole  20 mg QAM AC    apixaban  5 mg BID    atorvastatin  80 mg Q EVENING    lamoTRIgine  150 mg Q EVENING    losartan  25 mg Q DAY     PRN medications: hydrOXYzine HCl, melatonin, Respiratory Therapy Consult, acetaminophen, lactulose, docusate sodium, carboxymethylcellulose,  benzocaine-menthol, mag hydrox-al hydrox-simeth, ondansetron **OR** ondansetron, traZODone, sodium chloride, midazolam, senna-docusate **AND** polyethylene glycol/lytes **AND** magnesium hydroxide **AND** bisacodyl, hydrALAZINE, hydrALAZINE    Bowel:  Last Documented BM Date:Last BM: 08/04/23  Last Documented BM Description:      Bladder:  Last Documented Urine Void (mL):    Last Documented Bladder Scan: Post Void  Last Documented Bladder Scan Results (mL):      Physical Therapy:  Bed Mobility    Transfers        Mobility     Stairs    Barriers to Discharge Home:      Occupational Therapy:  Grooming     Bathing     UB Dressing     LB Dressing     Toileting     Shower & Transfer     Barriers to Discharge Home:    Diet:  Current Diet Order   Procedures    Diet Order Diet: Regular; Tray Modifications (optional): SLP - Deliver to Nursing Station     Medical Decision Making and Plan:      Stroke  Continue secondary prophylaxis  Comprehensive inpatient rehabilitation with physical therapy, Occupational Therapy, speech therapy    Seizures  Last known seizure November 2022  Continue lamotrigine  Continue follow-up with neurology in [place]    Hypertension  Appreciate hospitalist consult    Hyperlipidemia  Continue atorvastatin    DVT prophylaxis continue Eliquis    GI prophylaxis continue omeprazole    _____________________________________    Lloyd Flowers MD  [facility] - Physical Medicine & Rehabilitation     _____________________________________

## 2023-08-07 NOTE — CARE PLAN
The patient is Stable - Low risk of patient condition declining or worsening    Shift Goals  Clinical Goals: safety  Patient Goals: safety    Problem: Pain - Standard  Goal: Alleviation of pain or a reduction in pain to the patient’s comfort goal  Outcome: Progressing Patient able to verbalize pain level and verbalize an acceptable level of pain.      Problem: Fall Risk - Rehab  Goal: Patient will remain free from falls  Outcome: Progressing Pt uses call light consistently and appropriately. Waits for assistance does not attempt self transfer this shift. Able to verbalize needs.

## 2023-08-07 NOTE — PROGRESS NOTES
Patient care assumed. Report received from Ria Jones. Patient is alert and calm, resting in bed. Call light and bedside table within reach. Will continue to monitor.

## 2023-08-07 NOTE — CARE PLAN
"The patient is Stable - Low risk of patient condition declining or worsening    Shift Goals  Clinical Goals: safety  Patient Goals: safety    Patient is not progressing towards the following goals:    Problem: Fall Risk - Rehab  Goal: Patient will remain free from falls  Outcome: Not Met  Note: Mariela Rodgers Fall risk Assessment Score: 11    Moderate fall risk Interventions  - Bed and strip alarm   - Yellow sign by the door   - Yellow wrist band \"Fall risk\"  - Fall risk education provided     Problem: Bowel Elimination  Goal: Patient will participate in bowel management program  Outcome: Not Progressing  Note: Patient is constipated and refuses pericolace. Patient did agree to drink prune juice with evening medications.     "

## 2023-08-07 NOTE — THERAPY
"Physical Therapy   Daily Treatment     Patient Name: Doreen Noe  Age:  67 y.o., Sex:  female  Medical Record #: [id number]  Today's Date: 8/7/2023     Precautions  Precautions: Fall Risk  Comments: R hemiparesis, R PRAFO, mild R neglect, hx of seizures and L side CVA, Ziopatch    Subjective    Pt was seen twice at 0701 and 1031 for 90 min total of tx.    0701: Pt received resting in bed, pleasant and agreeable to participate. Excited to show this therapist that she can clear her RLE off the bed when lifting it.    1031: Pt received up in wc, pleasant and agreeable to participate. Reported that she felt \"great\" after shower with OT.     Objective       08/07/23 0701 08/07/23 1031   PT Charge Group   PT Gait Training (Units) 3  --    PT Neuromuscular Re-Education / Balance (Units)  --  1   PT Therapeutic Activities (Units) 1 1   PT Total Time Spent   PT Individual Total Time Spent (Mins) 60 30   Cognition    Attention  --  Impaired   Sequencing Impaired  --    Gait Functional Level of Assist    Gait Level Of Assist Total Assist X 2  (min-mod A x1 plus wc follow)  --    Assistive Device Front Wheel Walker;Other (Comments)  (L hallway rail and R ant tib Bioness)  --    Distance (Feet) 38  --    # of Times Distance was Traveled 1  (plus 30 ft c/ FWW; 70 ft x2 forw/bwd c/ hallway rail)  --    Deviation Step To;Decreased Base Of Support;Bradykinetic;Decreased Heel Strike;Decreased Toe Off  (assist to advance RLE, facilitate wider BRADY, prevent posterior/R sided LOB - provided R knee block but no buckling noted)  --    Wheelchair Functional Level of Assist   Wheelchair Assist  --  Stand by Assist   Distance Wheelchair (Feet or Distance)  --  250   Wheelchair Description  --  Extra time;Leg rest management;Impaired coordination;Limited by fatigue;Safety concerns;Supervision for safety;Verbal cueing  (LLE/BUE propulsion - required verbal cueing for R environmental inattention and RUE neglect, bumped into several obstacles " on R side)   Transfer Functional Level of Assist   Bed, Chair, Wheelchair Transfer Moderate Assist  --    Bed Chair Wheelchair Transfer Description Increased time;Set-up of equipment;Squat pivot transfer to wheelchair;Supervision for safety;Verbal cueing  (squat pivot to L bed>wc)  --    Toilet Transfers Minimal Assist  --    Toilet Transfer Description Grab bar;Increased time;Set-up of equipment;Supervision for safety;Verbal cueing  (wc<>toilet)  --    Bed Mobility    Supine to Sit Contact Guard Assist  (HOBE)  --    Sit to Stand Moderate Assist  (min A pull to stand hallway rail, mod A FWW for balance)  --    Neuro-Muscular Treatments   Neuro-Muscular Treatments  --  Compensatory Strategies;Verbal Cuing   Comments  --  Emphasis on visual scanning to R side during wc mobility   Interdisciplinary Plan of Care Collaboration   IDT Collaboration with  Therapy Tech Physician   Patient Position at End of Therapy Seated;Chair Alarm On;Self Releasing Lap Belt Applied;Other (Comments)  (in cafeteria for bfast) Seated;Chair Alarm On;Self Releasing Lap Belt Applied;Call Light within Reach;Tray Table within Reach;Phone within Reach   Collaboration Comments tech assist MD on rounds     0701:  Observed active R ankle ROM (3-/5 MMT) in supine and seated.  Toileting: Max A for clothing mgt d/t LUE support on GB and RUE uzma, pt able to perform pericare indep.  Requested that pt ask dtr (who is visiting from [place]) to bring shorts for RT-300 setup and closed toe shoes for possible AFO trials, pt agreeable and stated that dtr could bring them today.  Added pt to CVA edu class.    1031:  Wc mobility: Performed visual scanning activities to encourage R environmental awareness/RUE forced use including:  Watch RUE during wc self-propulsion c/ verbal cueing/demo of inc reach back for more equal BUE propulsive power  Press 2 wall buttons with R hand to open fire doors  Collect 6 cones on R side c/ R hand in west gym  Requested  that dtr also bring swimwear for possible aquatic therapy.    Assessment    Pt tolerated both sessions well focused on gait training then wc mobility including R visual scanning and RUE forced use. Based on previous therapy notes pt demo'ed first active movt in R ankle today which indicates good prognosis for cont return of motor function. However pt still benefited from use of R Bioness during gait training at hallway rail vs FWW to facilitate inc R foot clearance. Cont to present with R neglect, ie pt bumped into several obstacles on R side during wc mobility.     Strengths: Able to follow instructions, Effective communication skills, Good insight into deficits/needs, Independent prior level of function, Pleasant and cooperative, Motivated for self care and independence  Barriers: Decreased endurance, Fatigue, Generalized weakness, Hemiparesis, Home accessibility, Impaired activity tolerance, Impaired balance, Spasticity    Plan    Setup RT-300 RLE tmrw 8/8    Gait training byrd rail vs FWW with DF and eversion ace wrap/posterior AFO/R Bioness, trial treadmill training, trial stairs as appropriate (lives in [facility]SH 5 DENZEL), RLE and trunk NRE, RLE forced use, txfer training squat pivot vs FWW, bed mobility, R sided awareness. Aquatic therapy as able    Passport items to be completed:  Get in/out of bed safely, in/out of a vehicle, safely use mobility device, walk or wheel around home/community, navigate up and down stairs, show how to get up/down from the ground, ensure home is accessible, demonstrate HEP, complete caregiver training    Physical Therapy Problems (Active)       Problem: Mobility       Dates: Start:  08/05/23         Goal: STG-Within one week, patient will propel wheelchair 50' with MIN A       Dates: Start:  08/05/23            Goal: STG-Within one week, patient will ambulate 30' with FWW and MIN A       Dates: Start:  08/05/23            Goal: STG-Within one week, patient will ascend and descend  four to six stairs with MOD A and B handrails.        Dates: Start:  08/05/23               Problem: Mobility Transfers       Dates: Start:  08/05/23         Goal: STG-Within one week, patient will perform bed mobility including rolling L/R and supine<>sit with MIN A.        Dates: Start:  08/05/23            Goal: STG-Within one week, patient will transfer bed to chair with FWW and MIN A.        Dates: Start:  08/05/23               Problem: PT-Long Term Goals       Dates: Start:  08/05/23         Goal: LTG-By discharge, patient will ambulate 75' with FWW and SBA       Dates: Start:  08/05/23            Goal: LTG-By discharge, patient will transfer one surface to another with FWW independently.        Dates: Start:  08/05/23            Goal: LTG-By discharge, patient will ambulate up/down 4-6 stairs with B handrails and SBA       Dates: Start:  08/05/23            Goal: LTG-By discharge, patient will transfer in/out of a car with FWW and supervision.        Dates: Start:  08/05/23

## 2023-08-07 NOTE — THERAPY
Physical Therapy   Daily Treatment     Patient Name: Doreen Noe  Age:  67 y.o., Sex:  female  Medical Record #: [id number]  Today's Date: 8/7/2023     Precautions  Precautions: Fall Risk  Comments: R hemiparesis, R PRAFO, mild R neglect, hx of seizures and L side CVA, Ziopatch    Subjective    Patient agreeable to session. Has no complaints. Says she has had a lot of therapy today.      Objective       08/07/23 1431   PT Charge Group   PT Therapeutic Exercise (Units) 1   PT Therapeutic Activities (Units) 1   PT Total Time Spent   PT Individual Total Time Spent (Mins) 30   Transfer Functional Level of Assist   Bed, Chair, Wheelchair Transfer Maximal Assist   Bed Chair Wheelchair Transfer Description Adaptive equipment;Increased time;Initial preparation for task;Set-up of equipment;Supervision for safety;Verbal cueing  (max assist SPT using FWW. mod assist squat pivot w/c <> shuttle. cues for sequencing/safety throughout. poor foot clearance and weight shifting, and retropulsion.)   Supine Lower Body Exercise   Other Exercises shuttle: BLEs 4 bands x 15, RLE only 2 bands 2 x 15, 3 bands 1 x 15. PT cues and facilitation throughout to avoid compensation.   Bed Mobility    Supine to Sit Minimal Assist   Sit to Stand Moderate Assist   Interdisciplinary Plan of Care Collaboration   IDT Collaboration with  Family / Caregiver   Patient Position at End of Therapy Seated;Chair Alarm On;Self Releasing Lap Belt Applied;Call Light within Reach;Family / Friend in Room   Collaboration Comments SO present for session.     Discussed forced use principles, avoiding compensatory movement patterns or using non paretic side to assist uzma side unless at least attempting to move on its own first.     Encouraged seated ankle pumps working on neutral alignment      Assessment    Patient w/ impaired sequencing, proprioception and neuro control on RLE, benefits from multimodal cues during session. SO present and supportive, but does not  live w/ patient. Pretty significant retropulsion in initial standing and poor balance, needing cues to widen BRADY and move FWW then able to hold temporarily w/ CGA.       Strengths: Able to follow instructions, Effective communication skills, Good insight into deficits/needs, Independent prior level of function, Pleasant and cooperative, Motivated for self care and independence  Barriers: Decreased endurance, Fatigue, Generalized weakness, Hemiparesis, Home accessibility, Impaired activity tolerance, Impaired balance, Spasticity    Plan    Setup RT-300 RLE tmrw 8/8     Gait training byrd rail vs FWW with DF and eversion ace wrap/posterior AFO/R Bioness, trial treadmill training, trial stairs as appropriate (lives in [facility] 2SH 5 [place]), RLE and trunk NRE, RLE forced use, txfer training squat pivot vs FWW, bed mobility, R sided awareness. Aquatic therapy as able     Passport items to be completed:  Get in/out of bed safely, in/out of a vehicle, safely use mobility device, walk or wheel around home/community, navigate up and down stairs, show how to get up/down from the ground, ensure home is accessible, demonstrate HEP, complete caregiver training    Physical Therapy Problems (Active)       Problem: Mobility       Dates: Start:  08/05/23         Goal: STG-Within one week, patient will propel wheelchair 50' with MIN A       Dates: Start:  08/05/23            Goal: STG-Within one week, patient will ambulate 30' with FWW and MIN A       Dates: Start:  08/05/23            Goal: STG-Within one week, patient will ascend and descend four to six stairs with MOD A and B handrails.        Dates: Start:  08/05/23               Problem: Mobility Transfers       Dates: Start:  08/05/23         Goal: STG-Within one week, patient will perform bed mobility including rolling L/R and supine<>sit with MIN A.        Dates: Start:  08/05/23            Goal: STG-Within one week, patient will transfer bed to chair with FWW and MIN A.         Dates: Start:  08/05/23               Problem: PT-Long Term Goals       Dates: Start:  08/05/23         Goal: LTG-By discharge, patient will ambulate 75' with FWW and SBA       Dates: Start:  08/05/23            Goal: LTG-By discharge, patient will transfer one surface to another with FWW independently.        Dates: Start:  08/05/23            Goal: LTG-By discharge, patient will ambulate up/down 4-6 stairs with B handrails and SBA       Dates: Start:  08/05/23            Goal: LTG-By discharge, patient will transfer in/out of a car with FWW and supervision.        Dates: Start:  08/05/23

## 2023-08-08 ENCOUNTER — APPOINTMENT (OUTPATIENT)
Dept: PHYSICAL THERAPY | Facility: REHABILITATION | Age: 68
DRG: 057 | End: 2023-08-08
Attending: PHYSICAL MEDICINE & REHABILITATION
Payer: COMMERCIAL

## 2023-08-08 ENCOUNTER — APPOINTMENT (OUTPATIENT)
Dept: OCCUPATIONAL THERAPY | Facility: REHABILITATION | Age: 68
DRG: 057 | End: 2023-08-08
Attending: PHYSICAL MEDICINE & REHABILITATION
Payer: COMMERCIAL

## 2023-08-08 PROCEDURE — 97530 THERAPEUTIC ACTIVITIES: CPT

## 2023-08-08 PROCEDURE — 97535 SELF CARE MNGMENT TRAINING: CPT

## 2023-08-08 PROCEDURE — 97112 NEUROMUSCULAR REEDUCATION: CPT

## 2023-08-08 PROCEDURE — 770010 HCHG ROOM/CARE - REHAB SEMI PRIVAT*

## 2023-08-08 PROCEDURE — A9270 NON-COVERED ITEM OR SERVICE: HCPCS | Performed by: PHYSICAL MEDICINE & REHABILITATION

## 2023-08-08 PROCEDURE — 97116 GAIT TRAINING THERAPY: CPT

## 2023-08-08 PROCEDURE — 99232 SBSQ HOSP IP/OBS MODERATE 35: CPT | Performed by: PHYSICAL MEDICINE & REHABILITATION

## 2023-08-08 PROCEDURE — 700102 HCHG RX REV CODE 250 W/ 637 OVERRIDE(OP): Performed by: PHYSICAL MEDICINE & REHABILITATION

## 2023-08-08 RX ADMIN — APIXABAN 5 MG: 5 TABLET, FILM COATED ORAL at 08:55

## 2023-08-08 RX ADMIN — OMEPRAZOLE 20 MG: 20 CAPSULE, DELAYED RELEASE ORAL at 08:55

## 2023-08-08 RX ADMIN — LOSARTAN POTASSIUM 25 MG: 25 TABLET, FILM COATED ORAL at 05:26

## 2023-08-08 RX ADMIN — SENNOSIDES AND DOCUSATE SODIUM 2 TABLET: 50; 8.6 TABLET ORAL at 19:42

## 2023-08-08 RX ADMIN — LAMOTRIGINE 150 MG: 100 TABLET ORAL at 19:42

## 2023-08-08 RX ADMIN — APIXABAN 5 MG: 5 TABLET, FILM COATED ORAL at 19:43

## 2023-08-08 RX ADMIN — ATORVASTATIN CALCIUM 80 MG: 40 TABLET, FILM COATED ORAL at 19:43

## 2023-08-08 ASSESSMENT — ACTIVITIES OF DAILY LIVING (ADL)
BED_CHAIR_WHEELCHAIR_TRANSFER_DESCRIPTION: ADAPTIVE EQUIPMENT;INCREASED TIME;INITIAL PREPARATION FOR TASK;SET-UP OF EQUIPMENT;SUPERVISION FOR SAFETY;VERBAL CUEING

## 2023-08-08 ASSESSMENT — GAIT ASSESSMENTS
ASSISTIVE DEVICE: FRONT WHEEL WALKER;OTHER (COMMENTS)
GAIT LEVEL OF ASSIST: MODERATE ASSIST
DEVIATION: STEP TO;DECREASED BASE OF SUPPORT;BRADYKINETIC;DECREASED HEEL STRIKE;DECREASED TOE OFF
DISTANCE (FEET): 150

## 2023-08-08 NOTE — PROGRESS NOTES
NURSING DAILY NOTE    Name: Doreen Noe   Date of Admission: 8/4/2023   Admitting Diagnosis: Ischemic stroke (HCC)  Attending Physician: Leona Pleitez M.d.  Allergies: Patient has no known allergies.    Safety  Patient Assist  Max x 2  Patient Precautions  Fall Risk  Precaution Comments  R hemiparesis, R PRAFO, mild R neglect, hx of seizures and L side CVA, Ziopatch  Bed Transfer Status  Moderate Assist (up to max A when pt fatigued after amb)  Toilet Transfer Status   Minimal Assist  Assistive Devices  Gait Belt, Rails, Wheelchair  Oxygen  None - Room Air  Diet/Therapeutic Dining  Current Diet Order   Procedures    Diet Order Diet: Regular; Tray Modifications (optional): SLP - Deliver to Nursing Station     Pill Administration  whole  Agitated Behavioral Scale     ABS Level of Severity       Fall Risk  Has the patient had a fall this admission?   No  Mariela Rodgers Fall Risk Scoring  11, MODERATE RISK  Fall Risk Safety Measures  bed alarm, chair alarm, and poor balance    Vitals  Temperature: 36.4 °C (97.6 °F)  Temp src: Oral  Pulse: 69  Respiration: 18  Blood Pressure : 126/62  Blood Pressure MAP (Calculated): 83 MM HG  BP Location: Left, Upper Arm  Patient BP Position: Supine     Oxygen  Pulse Oximetry: 93 %  O2 (LPM): 0  O2 Delivery Device: None - Room Air    Bowel and Bladder  Last Bowel Movement  08/07/23  Stool Type  Type 1: Separate hard lumps (hard to pass)  Bowel Device     Continent  Bladder: Continent void   Bowel: Continent movement  Bladder Function  Number of Times Voided: 1  Urine Color: Yellow  Genitourinary Assessment   Bladder Assessment (WDL):  Within Defined Limits  Urine Color: Yellow  Bladder Scan: Post Void  $ Bladder Scan Results (mL): 13    Skin  Angus Score   18  Sensory Interventions   Bed Types: Standard/Trauma Mattress  Skin Preventative Measures: Pillows in Use for Support / Positioning  Moisture  Interventions  Moisturizers/Barriers: Barrier Wipes      Pain  Pain Rating Scale  0 - No Pain  Pain Location  Neck  Pain Location Orientation     Pain Interventions   Declines    ADLs    Bathing   Shower, * * With Assistance from, Staff  Linen Change      Personal Hygiene  Moist Preeti Wipes  Chlorhexidine Bath      Oral Care  Brushed Teeth  Teeth/Dentures     Shave     Nutrition Percentage Eaten  *  * Meal *  *, Lunch, Between % Consumed (100)  Environmental Precautions     Patient Turns/Positioning  Patient Turns Self from Side to Side  Patient Turns Assistance/Tolerance     Bed Positions  Bed Controls On, Bed Locked  Head of Bed Elevated  Self regulated      Psychosocial/Neurologic Assessment  Psychosocial Assessment  Psychosocial (WDL):  Within Defined Limits  Neurologic Assessment  Neuro (WDL): Exceptions to WDL  Level of Consciousness: Alert  Orientation Level: Oriented X4  Cognition: Appropriate judgement, Appropriate safety awareness, Appropriate attention/concentration, Appropriate for developmental age, Follows commands  Speech: Clear  Pupil Assesment: No  Motor Function/Sensation Assessment: Dorsiflexion, Motor response, Sensation, Motor strength  R Foot Dorsiflexion: Weak  L Foot Dorsiflexion: Strong  RUE Motor Response: Responds to commands  RUE Sensation: Full sensation  Muscle Strength Right Arm: Good Strength Against Gravity and Moderate Resistance  LUE Motor Response: Tremors, Responds to commands  LUE Sensation: Full sensation  Muscle Strength Left Arm: Normal Strength Against Gravity and Full Resistance  RLE Motor Response: Responds to commands  RLE Sensation: Numbness  Muscle Strength Right Leg: Weak Movement but Not Against Gravity or Resistance  LLE Motor Response: Responds to commands  LLE Sensation: Full sensation  Muscle Strength Left Leg: Normal Strength Against Gravity and Full Resistance  EENT (WDL):  WDL Except    Cardio/Pulmonary Assessment  Edema      Respiratory Breath Sounds  RUL  Breath Sounds: Clear  RML Breath Sounds: Clear  RLL Breath Sounds: Clear  MONIKA Breath Sounds: Clear  LLL Breath Sounds: Clear  Cardiac Assessment   Cardiac (WDL):  Within Defined Limits

## 2023-08-08 NOTE — THERAPY
Physical Therapy   Daily Treatment     Patient Name: Doreen Noe  Age:  67 y.o., Sex:  female  Medical Record #: [id number]  Today's Date: 8/8/2023     Precautions  Precautions: Fall Risk  Comments: R hemiparesis, R PRAFO, mild R neglect, hx of seizures and L side CVA, Ziopatch    Subjective    Pt received on toilet from CNA, agreeable to participate. Reported that she rolled her R ankle during toilet txfer c/ nursing yesterday but denied any lingering sxs. Multiple family members left at beginning of session.     Objective       08/08/23 1301   PT Charge Group   PT Gait Training (Units) 2   PT Neuromuscular Re-Education / Balance (Units) 1   PT Therapeutic Activities (Units) 1   PT Total Time Spent   PT Individual Total Time Spent (Mins) 60   Gait Functional Level of Assist    Gait Level Of Assist Moderate Assist  (min A when walking in straight line, mod A when turning to remain upright)   Assistive Device Front Wheel Walker;Other (Comments)  (R ant shelf AFO)   Distance (Feet) 150   # of Times Distance was Traveled 1  (plus 70 ft)   Deviation Step To;Decreased Base Of Support;Bradykinetic;Decreased Heel Strike;Decreased Toe Off  (verbal cues for wider BRADY, exaggerated R step to encourage inc hip/knee lift, awareness of RLE when turning)   Wheelchair Functional Level of Assist   Wheelchair Assist Stand by Assist   Distance Wheelchair (Feet or Distance) 50x2   Wheelchair Description Extra time;Impaired coordination;Limited by fatigue;Safety concerns;Supervision for safety;Verbal cueing  (BUE/LE propulsion, bumped into several obstacles on R side)   Transfer Functional Level of Assist   Bed, Chair, Wheelchair Transfer Moderate Assist  (up to max A when pt fatigued after amb)   Bed Chair Wheelchair Transfer Description Adaptive equipment;Increased time;Initial preparation for task;Set-up of equipment;Supervision for safety;Verbal cueing  (stand step FWW, max verbal cues for sequencing especially foot placement)  "  Bed Mobility    Sit to Stand Minimal Assist  (to maintain balance d/t retropulsion)   Neuro-Muscular Treatments   Neuro-Muscular Treatments Anterior weight shift;Joint Approximation;Postural Facilitation;Tactile Cuing;Verbal Cuing;Weight Shift Right   Comments Static standing/RLE forced use training. Tolerated standing with L foot on 4\" step to encourage RLE WB'ing x5 min - required verbal/tactile cues for R glute and quad activation   Interdisciplinary Plan of Care Collaboration   IDT Collaboration with  Physician;Family / Caregiver   Patient Position at End of Therapy In Bed;Bed Alarm On;Call Light within Reach;Tray Table within Reach;Phone within Reach   Collaboration Comments MD on rounds; multiple family members left at beginning of session     Provided pt edu re: purpose of AFO (to give stability/support and inc ease/safety of amb) plus various types of AFOs, ie showed pt custom articulated AFO and carbon fiber ant shelf AFO.    Assessment    Pt with gradually improving activity tolerance ea day. Demo'ed improved R foot clearance with ant shelf AFO and amb up to 150 ft today with min-mod A. However pt still presents with impaired overall R foot clearance and poor RLE awareness requiring cues to avoid scissoring when turning. Benefits from consistent cueing with sit<>stands and txfers for safety.    Strengths: Able to follow instructions, Effective communication skills, Good insight into deficits/needs, Independent prior level of function, Pleasant and cooperative, Motivated for self care and independence  Barriers: Decreased endurance, Fatigue, Generalized weakness, Hemiparesis, Home accessibility, Impaired activity tolerance, Impaired balance, Spasticity    Plan    Trial treadmill training tmrw 8/9    Gait training with FWW and ant shelf AFO vs R Bioness, trial treadmill training, trial stairs as appropriate (lives in [facility] 2SH 5 [place]), sit<>stands with emphasis on sequencing/avoiding retropulsion, RLE and " trunk NRE, RLE forced use, txfer training FWW, bed mobility, R sided awareness. Aquatic therapy as able. RT-300 RLE adjunct c/ techs 2-3x/wk as able.     Passport items to be completed:  Get in/out of bed safely, in/out of a vehicle, safely use mobility device, walk or wheel around home/community, navigate up and down stairs, show how to get up/down from the ground, ensure home is accessible, demonstrate HEP, complete caregiver training    Physical Therapy Problems (Active)       Problem: Mobility       Dates: Start:  08/05/23         Goal: STG-Within one week, patient will propel wheelchair 50' with MIN A       Dates: Start:  08/05/23            Goal: STG-Within one week, patient will ambulate 30' with FWW and MIN A       Dates: Start:  08/05/23            Goal: STG-Within one week, patient will ascend and descend four to six stairs with MOD A and B handrails.        Dates: Start:  08/05/23               Problem: Mobility Transfers       Dates: Start:  08/05/23         Goal: STG-Within one week, patient will perform bed mobility including rolling L/R and supine<>sit with MIN A.        Dates: Start:  08/05/23            Goal: STG-Within one week, patient will transfer bed to chair with FWW and MIN A.        Dates: Start:  08/05/23               Problem: PT-Long Term Goals       Dates: Start:  08/05/23         Goal: LTG-By discharge, patient will ambulate 75' with FWW and SBA       Dates: Start:  08/05/23            Goal: LTG-By discharge, patient will transfer one surface to another with FWW independently.        Dates: Start:  08/05/23            Goal: LTG-By discharge, patient will ambulate up/down 4-6 stairs with B handrails and SBA       Dates: Start:  08/05/23            Goal: LTG-By discharge, patient will transfer in/out of a car with FWW and supervision.        Dates: Start:  08/05/23

## 2023-08-08 NOTE — THERAPY
Physical Therapy   Daily Treatment     Patient Name: Doreen Noe  Age:  67 y.o., Sex:  female  Medical Record #: [id number]  Today's Date: 8/8/2023     Precautions  Precautions: Fall Risk  Comments: R hemiparesis, R PRAFO, mild R neglect, hx of seizures and L side CVA, Ziopatch    Subjective    Pt received standing at GB in bathroom with CNA after toileting, agreeable to participate in RT-300 set up.      Objective       08/08/23 0701   PT Charge Group   PT Neuromuscular Re-Education / Balance (Units) 4   PT Total Time Spent   PT Individual Total Time Spent (Mins) 60   Neuro-Muscular Treatments   Neuro-Muscular Treatments Electrical Stimulation   Comments RT-300 RLE set up (see PT note for details)   Interdisciplinary Plan of Care Collaboration   IDT Collaboration with  Certified Nursing Assistant   Patient Position at End of Therapy Seated;Chair Alarm On;Self Releasing Lap Belt Applied;Other (Comments)  (in cafeteria for bfast)   Collaboration Comments handoff from CNA     Pt set up on RT-300 FES for right LE neuro re-ed, ROM and sensory input.  Electrodes applied to right anterior tibialis, gastroc/soleus, quadriceps, hamstrings, and gluteals/hip extensors.     Adjusted  to improve overall functional positioning and posture for improved performance and comfort. Muscle stimulation parameters assessed for each muscle group to pt tolerance to e-stim and muscle contraction observed.     Pt tolerated fairly with initial reports of discomfort, parameters adjusted as needed.    Minutes of Cycling   *including warmup and cool down 18.5 minutes   Distance Traveled 2.21 miles   Energy Expended 0.6 kCal   Energy Per Hour 2.0 kCal/hr   Avg Power 2.3 W   Avg Asymmetry Left 10%     Provided STM to suboccipital mm x3 min during FES at pt request to relieve chronic neck pain, pt appreciative.    Assessment    Pt tolerated session well focused on RT-300 RLE set up. Appropriate to hand off to techs for adjunct FES therapy  moving forw to facilitate inc RLE motor return.    Strengths: Able to follow instructions, Effective communication skills, Good insight into deficits/needs, Independent prior level of function, Pleasant and cooperative, Motivated for self care and independence  Barriers: Decreased endurance, Fatigue, Generalized weakness, Hemiparesis, Home accessibility, Impaired activity tolerance, Impaired balance, Spasticity    Plan    Gait training byrd rail vs FWW with DF and eversion ace wrap/posterior AFO/R Bioness, trial treadmill training, trial stairs as appropriate (lives in [facility] 2SH 5 DENZEL), RLE and trunk NRE, RLE forced use, txfer training squat pivot vs FWW, bed mobility, R sided awareness. Aquatic therapy as able. RT-300 RLE adjunct c/ techs 2-3x/wk as able.     Passport items to be completed:  Get in/out of bed safely, in/out of a vehicle, safely use mobility device, walk or wheel around home/community, navigate up and down stairs, show how to get up/down from the ground, ensure home is accessible, demonstrate HEP, complete caregiver training    Physical Therapy Problems (Active)       Problem: Mobility       Dates: Start:  08/05/23         Goal: STG-Within one week, patient will propel wheelchair 50' with MIN A       Dates: Start:  08/05/23            Goal: STG-Within one week, patient will ambulate 30' with FWW and MIN A       Dates: Start:  08/05/23            Goal: STG-Within one week, patient will ascend and descend four to six stairs with MOD A and B handrails.        Dates: Start:  08/05/23               Problem: Mobility Transfers       Dates: Start:  08/05/23         Goal: STG-Within one week, patient will perform bed mobility including rolling L/R and supine<>sit with MIN A.        Dates: Start:  08/05/23            Goal: STG-Within one week, patient will transfer bed to chair with FWW and MIN A.        Dates: Start:  08/05/23               Problem: PT-Long Term Goals       Dates: Start:  08/05/23          Goal: LTG-By discharge, patient will ambulate 75' with FWW and SBA       Dates: Start:  08/05/23            Goal: LTG-By discharge, patient will transfer one surface to another with FWW independently.        Dates: Start:  08/05/23            Goal: LTG-By discharge, patient will ambulate up/down 4-6 stairs with B handrails and SBA       Dates: Start:  08/05/23            Goal: LTG-By discharge, patient will transfer in/out of a car with FWW and supervision.        Dates: Start:  08/05/23

## 2023-08-08 NOTE — PROGRESS NOTES
Rehab Progress Note     Date of Service: 8/8/2023  Chief Complaint: Follow-up stroke    Interval Events (Subjective)    Patient seen and examined today in the therapy gym.  She is doing some walking with physical therapy.  They are using an off-the-shelf AFO.  She last moved her bowels yesterday.  She denies any pain.  She is sleeping and eating well.  We will have her first weekly conference tomorrow to discuss a discharge date.    Patient has no other new questions, concerns, or complaints today.     Objective:  VITAL SIGNS: /69   Pulse 64   Temp 36.7 °C (98 °F) (Oral)   Resp 18   SpO2 100%   Gen: alert, no apparent distress  CV: Regular rate, regular rhythm  Resp: Clear to auscultation bilaterally  Neuro: Right hemiparesis, high amplitude/low-frequency tremor in the left arm and leg    No results found for this or any previous visit (from the past 72 hour(s)).      Scheduled Medications   Medication Dose Frequency    senna-docusate  2 Tablet BID    omeprazole  20 mg QAM AC    apixaban  5 mg BID    atorvastatin  80 mg Q EVENING    lamoTRIgine  150 mg Q EVENING    losartan  25 mg Q DAY       Current Diet Order   Procedures    Diet Order Diet: Regular; Tray Modifications (optional): SLP - Deliver to Nursing Station       Assessment:    This patient is a 67 y.o. female admitted for acute inpatient rehabilitation with Ischemic stroke (HCC).    We will have her first weekly conference tomorrow afternoon to discuss a discharge today.       Problem List/Medical Decision Making and Plan:      L CAROL ANN stroke  Right hemiparesis  PT/OT, 1.5 hr each discipline, 5 days per week    Continue Zio patch, return in 2 weeks    Continue Eliquis and atorvastatin    [facility]patient follow-up with [facility]    Seizure disorder  Last seizure November 2022  Generalized tonic-clonic  Outpatient follow-up with a provider in [place]  Continue lamotrigine     Tremor, left-sided  After her last stroke  Consider referral to  movement disorder clinic if this is interfering with her function     Hypertension  Continue losartan     Hyperlipidemia  Continue atorvastatin    GI prophylaxis  Continue omeprazole    DVT prophylaxis  Continue Julyquis    Leona Pleitez M.D.  Physical Medicine and Rehabilitation

## 2023-08-08 NOTE — PROGRESS NOTES
..                                                         NURSING DAILY NOTE    Name: Doreen Noe   Date of Admission: 8/4/2023   Admitting Diagnosis: Ischemic stroke (HCC)  Attending Physician: Leona Pleitez M.d.  Allergies: Patient has no known allergies.    Safety  Patient Assist  Max x 2  Patient Precautions  Fall Risk  Precaution Comments  R hemiparesis, R PRAFO, mild R neglect, hx of seizures and L side CVA, Ziopatch  Bed Transfer Status  Maximal Assist  Toilet Transfer Status   Minimal Assist  Assistive Devices  Rails, Wheelchair, Wheelchair push  Oxygen  None - Room Air  Diet/Therapeutic Dining  Current Diet Order   Procedures    Diet Order Diet: Regular; Tray Modifications (optional): SLP - Deliver to Nursing Station     Pill Administration  whole  Agitated Behavioral Scale     ABS Level of Severity       Fall Risk  Has the patient had a fall this admission?   No  Mariela Rodgers Fall Risk Scoring  11, MODERATE RISK  Fall Risk Safety Measures  bed alarm, chair alarm, seatbelt alarm, poor balance, and ok to leave pt in bathroom    Vitals  Temperature: 36.3 °C (97.3 °F)  Temp src: Oral  Pulse: 70  Respiration: 18  Blood Pressure : 136/66  Blood Pressure MAP (Calculated): 89 MM HG  BP Location: Left, Upper Arm  Patient BP Position: Supine     Oxygen  Pulse Oximetry: 95 %  O2 (LPM): 0  O2 Delivery Device: None - Room Air    Bowel and Bladder  Last Bowel Movement  08/07/23  Stool Type  Type 1: Separate hard lumps (hard to pass)  Bowel Device     Continent  Bladder: Continent void   Bowel: Continent movement  Bladder Function  Number of Times Voided: 1  Urine Color: Yellow  Genitourinary Assessment   Bladder Assessment (WDL):  Within Defined Limits  Urine Color: Yellow  Bladder Scan: Post Void  $ Bladder Scan Results (mL): 13    Skin  Angus Score   18  Sensory Interventions   Bed Types: Standard/Trauma Mattress  Skin Preventative Measures: Pillows in Use for Support / Positioning  Moisture  Interventions  Moisturizers/Barriers: Barrier Wipes      Pain  Pain Rating Scale  0 - No Pain  Pain Location  Neck  Pain Location Orientation     Pain Interventions   Declines    ADLs    Bathing   Shower, * * With Assistance from, Staff  Linen Change      Personal Hygiene  Moist Preeti Wipes  Chlorhexidine Bath      Oral Care  Brushed Teeth  Teeth/Dentures     Shave     Nutrition Percentage Eaten  *  * Meal *  *, Dinner, Between % Consumed  Environmental Precautions     Patient Turns/Positioning  Patient Turns Self from Side to Side  Patient Turns Assistance/Tolerance     Bed Positions  Bed Controls On, Bed Locked  Head of Bed Elevated  Self regulated      Psychosocial/Neurologic Assessment  Psychosocial Assessment  Psychosocial (WDL):  Within Defined Limits  Neurologic Assessment  Neuro (WDL): Exceptions to WDL  Level of Consciousness: Alert  Orientation Level: Oriented X4  Cognition: Appropriate judgement, Appropriate safety awareness, Appropriate attention/concentration, Appropriate for developmental age, Follows commands  Speech: Clear  Pupil Assesment: No  Motor Function/Sensation Assessment: Dorsiflexion, Motor response, Sensation, Motor strength  R Foot Dorsiflexion: Weak  L Foot Dorsiflexion: Strong  RUE Motor Response: Responds to commands  RUE Sensation: Full sensation  Muscle Strength Right Arm: Good Strength Against Gravity and Moderate Resistance  LUE Motor Response: Tremors, Responds to commands  LUE Sensation: Full sensation  Muscle Strength Left Arm: Normal Strength Against Gravity and Full Resistance  RLE Motor Response: Responds to commands  RLE Sensation: Numbness  Muscle Strength Right Leg: Weak Movement but Not Against Gravity or Resistance  LLE Motor Response: Responds to commands  LLE Sensation: Full sensation  Muscle Strength Left Leg: Normal Strength Against Gravity and Full Resistance  EENT (WDL):  WDL Except    Cardio/Pulmonary Assessment  Edema      Respiratory Breath Sounds  RUL  Breath Sounds: Clear  RML Breath Sounds: Clear  RLL Breath Sounds: Clear  MONIKA Breath Sounds: Clear  LLL Breath Sounds: Clear  Cardiac Assessment   Cardiac (WDL):  Within Defined Limits

## 2023-08-08 NOTE — CARE PLAN
Problem: Pain - Standard  Goal: Alleviation of pain or a reduction in pain to the patient’s comfort goal  Outcome: Progressing     Problem: Fall Risk - Rehab  Goal: Patient will remain free from falls  Outcome: Progressing

## 2023-08-08 NOTE — CARE PLAN
"The patient is Stable - Low risk of patient condition declining or worsening    Shift Goals  Clinical Goals: safety  Patient Goals: safety    Progress made toward(s) clinical / shift goals:      Problem: Pain - Standard  Goal: Alleviation of pain or a reduction in pain to the patient’s comfort goal  Outcome: Progressing  Note: No reports of pain at this time. Will continue to monitor through shift with hourly rounds.      Patient is not progressing towards the following goals:    Problem: Fall Risk - Rehab  Goal: Patient will remain free from falls  Outcome: Not Met  Note: Mariela Rodgers Fall risk Assessment Score: 11    Moderate fall risk Interventions  - Bed and strip alarm   - Yellow sign by the door   - Yellow wrist band \"Fall risk\"  - Room near to the nurse station  - Do not leave patient unattended in the bathroom  - Fall risk education provided     "

## 2023-08-08 NOTE — THERAPY
Occupational Therapy  Daily Treatment     Patient Name: Doreen Noe  Age:  67 y.o., Sex:  female  Medical Record #: [id number]  Today's Date: 8/8/2023     Precautions  Precautions: Fall Risk  Comments: R hemiparesis, R PRAFO, mild R neglect, hx of seizures and L side CVA, Ziopatch    Subjective    Patient seated in w/c upon arrival, agreeable to participate in OT.      Objective     08/08/23 1001   OT Charge Group   OT Self Care / ADL (Units) 2   OT Neuromuscular Re-education / Balance (Units) 1   OT Therapy Activity (Units) 1   OT Total Time Spent   OT Individual Total Time Spent (Mins) 60   Precautions   Precautions Fall Risk   Cognition    Level of Consciousness Alert   Sleep/Wake Cycle   Sleep & Rest Awake   Strength Upper Body   Right    (45# 42# 35#)   Left    (46# 55# 50#)   Functional Level of Assist   Lower Body Dressing Minimal Assist  (to don/doff scrub bottoms while incorporating sitting and standing w/ FWW)   Outcome Measures   Outcome Measures Utilized 9 Hole Peg Test   9 Hole Peg Test   Right Hand (seconds) 33   Left Hand (seconds) 30   Interdisciplinary Plan of Care Collaboration   Patient Position at End of Therapy Seated;Self Releasing Lap Belt Applied;Tray Table within Reach;Call Light within Reach     Completed graded clothespin tree using RUE w/ mildly increased time for heavier resistance clothespins, no manual assist required to complete task    Blocked STS practice to facilitate independence, safety and carryover of strategies     IADLs: While standing, patient txfrd clothing washer> dryer and placed dirty clothing into washer @ CGA level. Min A required for STS from wc to complete laundry task in standing.     Assessment    Patient yakelin's good functional progress w/ LB dressing independence and progressed STS from min to CGA within this OT session. Manual and verbal cues required for anterior weight shifting and additional blocked practice recommended to maximize carryover. Cues  also provided for functional sequencing/problem solving.      Plan    - RUE neuro re-ed (patient able to adequately incorporate into functional tasks however strength, coordination and attention to R side mildly impaired.   - ADLs, functional mobility, balance   - Patient has tub/shower w/ curtain on first floor @ home; reports she can DC to boyfriend's home (in [place], [place]) w/ his support if needed following DC from rehab    Occupational Therapy Goals (Active)       Problem: Dressing       Dates: Start:  08/05/23         Goal: STG-Within one week, patient will dress UB at a Mod I level.        Dates: Start:  08/05/23            Goal: STG-Within one week, patient will dress LB at a Min A level with AE/AD PRN.        Dates: Start:  08/05/23               Problem: Functional Transfers       Dates: Start:  08/05/23         Goal: STG-Within one week, patient will transfer to toilet at a CGA level with AE/AD PRN.        Dates: Start:  08/05/23            Goal: STG-Within one week, patient will transfer to tub/shower at a CGA level with AE/AD/DME PRN.        Dates: Start:  08/05/23               Problem: OT Long Term Goals       Dates: Start:  08/05/23         Goal: LTG-By discharge, patient will complete basic self care tasks at a SUP/Mod I level with AE/AD/DME PRN.        Dates: Start:  08/05/23            Goal: LTG-By discharge, patient will perform bathroom transfers at a SUP/Mod I level with AE/AD/DME PRN.        Dates: Start:  08/05/23            Goal: LTG-By discharge, patient will complete basic home management at a Min A to Mod I level with LRD and AE PRN.        Dates: Start:  08/05/23               Problem: Toileting       Dates: Start:  08/05/23         Goal: STG-Within one week, patient will complete toileting tasks at a CGA level with AE/AD/DME PRN.        Dates: Start:  08/05/23

## 2023-08-09 ENCOUNTER — APPOINTMENT (OUTPATIENT)
Dept: PHYSICAL THERAPY | Facility: REHABILITATION | Age: 68
DRG: 057 | End: 2023-08-09
Attending: PHYSICAL MEDICINE & REHABILITATION
Payer: COMMERCIAL

## 2023-08-09 ENCOUNTER — APPOINTMENT (OUTPATIENT)
Dept: OCCUPATIONAL THERAPY | Facility: REHABILITATION | Age: 68
DRG: 057 | End: 2023-08-09
Attending: PHYSICAL MEDICINE & REHABILITATION
Payer: COMMERCIAL

## 2023-08-09 DIAGNOSIS — I63.9 ACUTE ISCHEMIC STROKE (HCC): ICD-10-CM

## 2023-08-09 PROCEDURE — 97116 GAIT TRAINING THERAPY: CPT

## 2023-08-09 PROCEDURE — 97535 SELF CARE MNGMENT TRAINING: CPT

## 2023-08-09 PROCEDURE — 99232 SBSQ HOSP IP/OBS MODERATE 35: CPT | Performed by: PHYSICAL MEDICINE & REHABILITATION

## 2023-08-09 PROCEDURE — A9270 NON-COVERED ITEM OR SERVICE: HCPCS | Performed by: PHYSICAL MEDICINE & REHABILITATION

## 2023-08-09 PROCEDURE — 700102 HCHG RX REV CODE 250 W/ 637 OVERRIDE(OP): Performed by: PHYSICAL MEDICINE & REHABILITATION

## 2023-08-09 PROCEDURE — 97112 NEUROMUSCULAR REEDUCATION: CPT

## 2023-08-09 PROCEDURE — 97530 THERAPEUTIC ACTIVITIES: CPT

## 2023-08-09 PROCEDURE — 770010 HCHG ROOM/CARE - REHAB SEMI PRIVAT*

## 2023-08-09 RX ORDER — DIPHENHYDRAMINE HYDROCHLORIDE, ZINC ACETATE 2; .1 G/100G; G/100G
CREAM TOPICAL 3 TIMES DAILY
Status: DISCONTINUED | OUTPATIENT
Start: 2023-08-09 | End: 2023-08-11

## 2023-08-09 RX ORDER — DIPHENHYDRAMINE HCL 25 MG
25 TABLET ORAL EVERY 6 HOURS PRN
Status: DISCONTINUED | OUTPATIENT
Start: 2023-08-09 | End: 2023-08-22 | Stop reason: HOSPADM

## 2023-08-09 RX ADMIN — APIXABAN 5 MG: 5 TABLET, FILM COATED ORAL at 08:35

## 2023-08-09 RX ADMIN — LOSARTAN POTASSIUM 25 MG: 25 TABLET, FILM COATED ORAL at 05:18

## 2023-08-09 RX ADMIN — DIPHENHYDRAMINE HYDROCHLORIDE, ZINC ACETATE: 2; .1 CREAM TOPICAL at 20:39

## 2023-08-09 RX ADMIN — DIPHENHYDRAMINE HYDROCHLORIDE, ZINC ACETATE: 2; .1 CREAM TOPICAL at 16:26

## 2023-08-09 RX ADMIN — OMEPRAZOLE 20 MG: 20 CAPSULE, DELAYED RELEASE ORAL at 08:35

## 2023-08-09 RX ADMIN — APIXABAN 5 MG: 5 TABLET, FILM COATED ORAL at 20:38

## 2023-08-09 RX ADMIN — ATORVASTATIN CALCIUM 80 MG: 40 TABLET, FILM COATED ORAL at 20:38

## 2023-08-09 RX ADMIN — LAMOTRIGINE 150 MG: 100 TABLET ORAL at 20:38

## 2023-08-09 RX ADMIN — SENNOSIDES AND DOCUSATE SODIUM 2 TABLET: 50; 8.6 TABLET ORAL at 20:38

## 2023-08-09 ASSESSMENT — ACTIVITIES OF DAILY LIVING (ADL)
TOILETING_LEVEL_OF_ASSIST_DESCRIPTION: ASSIST TO PULL PANTS UP;ASSIST TO PULL PANTS DOWN;ASSIST FOR STANDING BALANCE;GRAB BAR;INCREASED TIME;INITIAL PREPARATION FOR TASK;SET-UP OF EQUIPMENT;SUPERVISION FOR SAFETY;VERBAL CUEING
TOILET_TRANSFER_DESCRIPTION: GRAB BAR;INCREASED TIME;SET-UP OF EQUIPMENT;SUPERVISION FOR SAFETY;VERBAL CUEING;INITIAL PREPARATION FOR TASK

## 2023-08-09 ASSESSMENT — GAIT ASSESSMENTS
DISTANCE (FEET): 200
GAIT LEVEL OF ASSIST: MODERATE ASSIST
ASSISTIVE DEVICE: FRONT WHEEL WALKER
DEVIATION: DECREASED HEEL STRIKE;DECREASED TOE OFF;DECREASED BASE OF SUPPORT

## 2023-08-09 ASSESSMENT — FIBROSIS 4 INDEX: FIB4 SCORE: 1.385804656311467823

## 2023-08-09 NOTE — DISCHARGE PLANNING
Case Management / Initial authorization:  Auth # [id number]  Days authorized:8/4 to 8/10  Clinical concurrent review faxed via [employer] Fax today on 8/9    Name: John  Phone: [phone]  Fax:[phone]   Outcome: pending     8/11/2023  [facility]; they did not receive; re faxed this am.

## 2023-08-09 NOTE — THERAPY
"Physical Therapy   Daily Treatment     Patient Name: Doreen Noe  Age:  67 y.o., Sex:  female  Medical Record #: [id number]  Today's Date: 8/9/2023     Precautions  Precautions: (P) Fall Risk  Comments: (P) R hemiparesis, R PRAFO, mild R neglect, hx of seizures and L side CVA, Ziopatch    Subjective    Pt was agreeable to treadmill training session one, and Vector training session two.      Objective       08/09/23 1031 08/09/23 1501   PT Charge Group   PT Gait Training (Units)  --  1   PT Neuromuscular Re-Education / Balance (Units) 2 2   PT Therapeutic Activities (Units) 2 1   PT Total Time Spent   PT Individual Total Time Spent (Mins) 60 60   Precautions   Precautions Fall Risk Fall Risk   Comments R hemiparesis, R PRAFO, mild R neglect, hx of seizures and L side CVA, Ziopatch R hemiparesis, R PRAFO, mild R neglect, hx of seizures and L side CVA, Ziopatch   Gait Functional Level of Assist    Gait Level Of Assist  --  Moderate Assist  (Vector harness)   Assistive Device  --  Front Wheel Walker   Distance (Feet)  --  200  (100 ft with medium Theraband resisted hip ABD during gait training to promote wider BRADY/ stability = effective.)   # of Times Distance was Traveled  --  1   Deviation  --  Decreased Heel Strike;Decreased Toe Off;Decreased Base Of Support   Stairs Functional Level of Assist   Level of Assist with Stairs  --  Moderate Assist  (Vector harness, no additional BWS)   # of Stairs Climbed  --  4  (2\" platform step)   Stairs Description  --  Walker;AFO;Limited by fatigue;Safety concerns;Extra time;Verbal cueing   Standing Lower Body Exercises   Step Up  --    (5x 2\" step ups with FWW, min A for R knee stability as needed, AFO donned)   Bed Mobility    Sit to Stand Contact Guard Assist Moderate Assist  (Additional assist d/t the Vector harness learning curve)   Neuro-Muscular Treatments   Neuro-Muscular Treatments Weight Shift Left;Weight Shift Right;Verbal Cuing;Tactile Cuing;Sequencing Weight Shift " Left;Weight Shift Right;Verbal Cuing;Tactile Cuing;Sequencing;Postural Facilitation;Postural Changes;Joint Approximation;Anterior weight shift   Comments Standing in // bars 2 min to don LiteGait harness. Step up fwd/ bwd down on/off treadmill CGA/Christy in LiteGait harness. Treamill training in LiteGait harness 5 minx3 mod A facilitation to RLE (of the shelf AFO donned); vc for step through with LLE, 0.6-0.7 mph. Trialed bwd amb (unable to coordinate). Standing in // bars while donning Vector harness CGA x 3 min. Profile created in [employer] ([facility]).   Interdisciplinary Plan of Care Collaboration   IDT Collaboration with  Physician Family / Caregiver   Patient Position at End of Therapy Seated;Chair Alarm On;Self Releasing Lap Belt Applied;Other (Comments)  (handoff in TDine, alarming seat belt)  --    Collaboration Comments POC, CLOF, mild spasticity POC, CLOF         Assessment    Session one: Pt completed a total of 15 min of treadmill training, without additional BWS. LiteGait harness was donned for safety, and mod A facilitation was provided for improved achievement of gait phases. Pt was able to progress to step through method. Pt completed 859 ft in total/ 0.16 miles, and rest was taken every 5 min.     Session two: Pt was introduced to Vector harness training, without additional BWS. Gait smoothing and posterior trolley positioning were utilized for improved symmetry and posture. Pt completed a total of 22 min of active (pre)gait training for gait and initiation of step training. Pt demonstrated improved stability and BRADY (tends to be too narrrow/ LOB Right) with medium Theraband resisted RLE ABD, during Vector training.     Strengths: Able to follow instructions, Effective communication skills, Good insight into deficits/needs, Independent prior level of function, Pleasant and cooperative, Motivated for self care and independence  Barriers: Decreased endurance, Fatigue, Generalized weakness, Hemiparesis,  Home accessibility, Impaired activity tolerance, Impaired balance, Spasticity    Plan    Gait training with FWW and ant shelf AFO vs R Bioness, continue treadmill training, progress stairs as appropriate (lives in [facility] 2SH 5 DENZEL), sit<>stands with emphasis on sequencing/avoiding retropulsion, RLE and trunk NRE, RLE forced use, txfer training FWW, bed mobility, R sided awareness. Aquatic therapy as able. RT-300 RLE adjunct c/ techs 2-3x/wk as able. Vector profile in [facility].     Passport items to be completed:  Get in/out of bed safely, in/out of a vehicle, safely use mobility device, walk or wheel around home/community, navigate up and down stairs, show how to get up/down from the ground, ensure home is accessible, demonstrate HEP, complete caregiver training    Physical Therapy Problems (Active)       Problem: Mobility       Dates: Start:  08/05/23         Goal: STG-Within one week, patient will ascend and descend four to six stairs with MOD A and B handrails.        Dates: Start:  08/05/23         Goal Note filed on 08/09/23 1323 by Jonelle Tabares, PT       Not yet assessed                 Problem: Mobility Transfers       Dates: Start:  08/05/23         Goal: STG-Within one week, patient will perform bed mobility including rolling L/R and supine<>sit with MIN A.        Dates: Start:  08/05/23         Goal Note filed on 08/09/23 1323 by Jonelle Tabares, PT       Needs to be reassessed given recent functional progress              Goal: STG-Within one week, patient will transfer bed to chair with FWW and MIN A.        Dates: Start:  08/05/23         Goal Note filed on 08/09/23 1323 by Jonelle Tabares, PT       Mod-max A c/ FWW                 Problem: PT-Long Term Goals       Dates: Start:  08/05/23         Goal: LTG-By discharge, patient will ambulate 75' with FWW and SBA       Dates: Start:  08/05/23            Goal: LTG-By discharge, patient will transfer one surface to another with FWW independently.        Dates:  Start:  08/05/23            Goal: LTG-By discharge, patient will ambulate up/down 4-6 stairs with B handrails and SBA       Dates: Start:  08/05/23            Goal: LTG-By discharge, patient will transfer in/out of a car with FWW and supervision.        Dates: Start:  08/05/23

## 2023-08-09 NOTE — CARE PLAN
Problem: Dressing  Goal: STG-Within one week, patient will dress UB at a Mod I level.   Outcome: Not Met  Note: Requires Min A  Goal: STG-Within one week, patient will dress LB at a Min A level with AE/AD PRN.   Outcome: Not Met  Note: Inconsistent      Problem: Toileting  Goal: STG-Within one week, patient will complete toileting tasks at a CGA level with AE/AD/DME PRN.   Outcome: Not Met  Note: Requires Mod A     Problem: Functional Transfers  Goal: STG-Within one week, patient will transfer to toilet at a CGA level with AE/AD PRN.   Outcome: Not Met  Note: Requires Min A  Goal: STG-Within one week, patient will transfer to tub/shower at a CGA level with AE/AD/DME PRN.   Outcome: Not Met  Note: Requires Min A

## 2023-08-09 NOTE — PROGRESS NOTES
Patient care assumed. Report received from [facility]. Patient is alert and calm, resting in bed. Call light and bedside table within reach. Will continue to monitor.

## 2023-08-09 NOTE — CARE PLAN
"  Problem: Pain - Standard  Goal: Alleviation of pain or a reduction in pain to the patient’s comfort goal  Outcome: Progressing  Note: Assessed for pain and discomfort , pain under control, needs anticipated and attended. Cont monitored.     Problem: Fall Risk - Rehab  Goal: Patient will remain free from falls  8/9/2023 0002 by Brenda Nayak R.N.  Outcome: Progressing  Note: Mariela Rodgers Fall risk Assessment Score: 11    Moderate fall risk Interventions  - Bed and strip alarm   - Yellow sign by the door   - Yellow wrist band \"Fall risk\"  - Room near to the nurse station  - Do not leave patient unattended in the bathroom  - Fall risk education provided      Pt free from fall and injury.      "

## 2023-08-09 NOTE — CARE PLAN
Problem: Mobility  Goal: STG-Within one week, patient will ascend and descend four to six stairs with MOD A and B handrails.   Outcome: Not Met  Note: Not yet assessed     Problem: Mobility Transfers  Goal: STG-Within one week, patient will perform bed mobility including rolling L/R and supine<>sit with MIN A.   Outcome: Not Met  Note: Needs to be reassessed given recent functional progress     Problem: Mobility Transfers  Goal: STG-Within one week, patient will transfer bed to chair with FWW and MIN A.   Outcome: Progressing  Note: Mod-max A c/ FWW     Problem: Mobility  Goal: STG-Within one week, patient will propel wheelchair 50' with MIN A  Outcome: Met  Goal: STG-Within one week, patient will ambulate 30' with FWW and MIN A  Outcome: Met

## 2023-08-09 NOTE — PROGRESS NOTES
NURSING DAILY NOTE    Name: Doreen Noe   Date of Admission: 8/4/2023   Admitting Diagnosis: Ischemic stroke (HCC)  Attending Physician: Leona Pleitez M.d.  Allergies: Patient has no known allergies.    Safety  Patient Assist  Max x 2  Patient Precautions  Fall Risk  Precaution Comments  R hemiparesis, R PRAFO, mild R neglect, hx of seizures and L side CVA, Ziopatch  Bed Transfer Status  Moderate Assist (up to max A when pt fatigued after amb)  Toilet Transfer Status   Minimal Assist  Assistive Devices  Gait Belt, Rails, Wheelchair  Oxygen  None - Room Air  Diet/Therapeutic Dining  Current Diet Order   Procedures    Diet Order Diet: Regular; Tray Modifications (optional): SLP - Deliver to Nursing Station     Pill Administration  whole  Agitated Behavioral Scale     ABS Level of Severity       Fall Risk  Has the patient had a fall this admission?   No  Mariela Rodgers Fall Risk Scoring  11, MODERATE RISK  Fall Risk Safety Measures  bed alarm, chair alarm, seatbelt alarm, and poor balance    Vitals  Temperature: 36.6 °C (97.9 °F)  Temp src: Oral  Pulse: 74  Respiration: 18  Blood Pressure : 132/77  Blood Pressure MAP (Calculated): 95 MM HG  BP Location: Left, Upper Arm  Patient BP Position: Sitting     Oxygen  Pulse Oximetry: 94 %  O2 (LPM): 0  O2 Delivery Device: None - Room Air    Bowel and Bladder  Last Bowel Movement  08/07/23  Stool Type  Type 1: Separate hard lumps (hard to pass)  Bowel Device     Continent  Bladder: Continent void   Bowel: Continent movement  Bladder Function  Number of Times Voided: 1  Urine Color: Yellow  Genitourinary Assessment   Bladder Assessment (WDL):  Within Defined Limits  Urine Color: Yellow  Bladder Scan: Post Void  $ Bladder Scan Results (mL): 13    Skin  Angus Score   18  Sensory Interventions   Bed Types: Standard/Trauma Mattress  Skin Preventative Measures: Pillows in Use for Support / Positioning  Moisture  Interventions  Moisturizers/Barriers: Moisturizer       Pain  Pain Rating Scale  0 - No Pain  Pain Location  Neck  Pain Location Orientation     Pain Interventions   Declines    ADLs    Bathing   Shower, * * With Assistance from, Staff  Linen Change      Personal Hygiene  Moist Preeti Wipes  Chlorhexidine Bath      Oral Care  Brushed Teeth  Teeth/Dentures     Shave     Nutrition Percentage Eaten  Dinner, Between % Consumed  Environmental Precautions  Treaded Slipper Socks on Patient, Personal Belongings, Wastebasket, Call Bell etc. in Easy Reach, Bed in Low Position  Patient Turns/Positioning  Patient Turns Self from Side to Side  Patient Turns Assistance/Tolerance     Bed Positions  Bed Controls On, Bed Locked  Head of Bed Elevated  Self regulated      Psychosocial/Neurologic Assessment  Psychosocial Assessment  Psychosocial (WDL):  Within Defined Limits  Neurologic Assessment  Neuro (WDL): Exceptions to WDL  Level of Consciousness: Alert  Orientation Level: Oriented X4  Cognition: Appropriate judgement, Appropriate safety awareness, Appropriate attention/concentration, Appropriate for developmental age, Follows commands  Speech: Clear  Pupil Assesment: No  Motor Function/Sensation Assessment: Dorsiflexion, Motor response, Sensation, Motor strength  R Foot Dorsiflexion: Weak  L Foot Dorsiflexion: Strong  RUE Motor Response: Responds to commands  RUE Sensation: Full sensation  Muscle Strength Right Arm: Good Strength Against Gravity and Moderate Resistance  LUE Motor Response: Tremors, Responds to commands  LUE Sensation: Full sensation  Muscle Strength Left Arm: Normal Strength Against Gravity and Full Resistance  RLE Motor Response: Responds to commands  RLE Sensation: Numbness  Muscle Strength Right Leg: Weak Movement but Not Against Gravity or Resistance  LLE Motor Response: Responds to commands  LLE Sensation: Full sensation  Muscle Strength Left Leg: Normal Strength Against Gravity and Full  Resistance  EENT (WDL):  WDL Except    Cardio/Pulmonary Assessment  Edema      Respiratory Breath Sounds  RUL Breath Sounds: Clear  RML Breath Sounds: Clear  RLL Breath Sounds: Clear  MONIKA Breath Sounds: Clear  LLL Breath Sounds: Clear  Cardiac Assessment   Cardiac (WDL):  WDL Except

## 2023-08-09 NOTE — THERAPY
"Occupational Therapy  Daily Treatment     Patient Name: Doreen Noe  Age:  67 y.o., Sex:  female  Medical Record #: [id number]  Today's Date: 8/9/2023     Precautions  Precautions: (P) Fall Risk  Comments: (P) R hemiparesis, R PRAFO, mild R neglect, hx of seizures and L side CVA, Ziopatch         Subjective    Pt was alert and cooperative w/ tx.     Objective       08/09/23 0831   Precautions   Precautions Fall Risk   Comments R hemiparesis, R PRAFO, mild R neglect, hx of seizures and L side CVA, Ziopatch   Vitals   Pulse Oximetry 94 %   O2 (LPM) 0   O2 Delivery Device None - Room Air   Pain   Intervention Declines   Non Verbal Descriptors   Non Verbal Scale  Calm;Unlabored Breathing   Cognition    Speech/ Communication Word Finding Impairment   Level of Consciousness Alert   Attention Impaired   Sequencing Impaired   Comments 22 piece PVC puzzle activity in sitting.  Medium complex, 12 pipes (2 types blue and green), 10 connectors  (2 types of connectors 90 degree elbow and 4 way) in 3-dimensional \"X\" pattern.  15 mins to complete 8 VQ's, w/ 3 VQ's to compare/contrast w/ self correction.  Appropriate integration of RUE as a functional assist and stabilizer.  Therapist placed pieces to right to promote R side attention.   ABS (Agitated Behavior Scale)   Agitated Behavior Scale Performed No   Functional Level of Assist   Grooming Supervision;Seated   Grooming Description Increased time;Seated in wheelchair at sink;Supervision for safety;Verbal cueing   Toileting Moderate Assist   Toileting Description Assist to pull pants up;Assist to pull pants down;Assist for standing balance;Grab bar;Increased time;Initial preparation for task;Set-up of equipment;Supervision for safety;Verbal cueing   Toilet Transfers Minimal Assist   Toilet Transfer Description Grab bar;Increased time;Set-up of equipment;Supervision for safety;Verbal cueing;Initial preparation for task   Standing Upper Body Exercises   Standing Upper Body " "Exercises Yes   Other Exercises CGA standing at FluioBike, 10 mins, no rest breaks, Level \"0\", no LOB, required initial R foot placement, 1.041km   9 Hole Peg Test   Right Hand (seconds) 29   Left Hand (seconds) 25         Assessment    OT tx emphasis Right side attention, standing balance/endurance, RUE integration as a functional assist, cog/problem solving/compare and contrast, bathroom ADL's.  See notes above.  Noted continued R side inattention, frqnt VQ's for carryover of 3-dimensional PVC pipe puzzle activity, required placement of R foot for standing TherEx at fluidobike/bathroom transfers.  Therapist reviewed/educated pt on environmental/safety awareness, fall precautions, R side attention, ADL's and transfers.       Plan    Refer to primary OT for POC    Passport items to be completed:  Perform bathroom transfers, complete dressing, complete feeding, get ready for the day, prepare a simple meal, participate in household tasks, adapt home for safety needs, demonstrate home exercise program, complete caregiver training     Occupational Therapy Goals (Active)       Problem: Dressing       Dates: Start:  08/05/23         Goal: STG-Within one week, patient will dress UB at a Mod I level.        Dates: Start:  08/05/23            Goal: STG-Within one week, patient will dress LB at a Min A level with AE/AD PRN.        Dates: Start:  08/05/23               Problem: Functional Transfers       Dates: Start:  08/05/23         Goal: STG-Within one week, patient will transfer to toilet at a CGA level with AE/AD PRN.        Dates: Start:  08/05/23            Goal: STG-Within one week, patient will transfer to tub/shower at a CGA level with AE/AD/DME PRN.        Dates: Start:  08/05/23               Problem: OT Long Term Goals       Dates: Start:  08/05/23         Goal: LTG-By discharge, patient will complete basic self care tasks at a SUP/Mod I level with AE/AD/DME PRN.        Dates: Start:  08/05/23            Goal: " LTG-By discharge, patient will perform bathroom transfers at a SUP/Mod I level with AE/AD/DME PRN.        Dates: Start:  08/05/23            Goal: LTG-By discharge, patient will complete basic home management at a Min A to Mod I level with LRD and AE PRN.        Dates: Start:  08/05/23               Problem: Toileting       Dates: Start:  08/05/23         Goal: STG-Within one week, patient will complete toileting tasks at a CGA level with AE/AD/DME PRN.        Dates: Start:  08/05/23

## 2023-08-10 ENCOUNTER — APPOINTMENT (OUTPATIENT)
Dept: OCCUPATIONAL THERAPY | Facility: REHABILITATION | Age: 68
DRG: 057 | End: 2023-08-10
Attending: PHYSICAL MEDICINE & REHABILITATION
Payer: COMMERCIAL

## 2023-08-10 ENCOUNTER — APPOINTMENT (OUTPATIENT)
Dept: SPEECH THERAPY | Facility: REHABILITATION | Age: 68
DRG: 057 | End: 2023-08-10
Attending: PHYSICAL MEDICINE & REHABILITATION
Payer: COMMERCIAL

## 2023-08-10 ENCOUNTER — APPOINTMENT (OUTPATIENT)
Dept: PHYSICAL THERAPY | Facility: REHABILITATION | Age: 68
DRG: 057 | End: 2023-08-10
Attending: PHYSICAL MEDICINE & REHABILITATION
Payer: COMMERCIAL

## 2023-08-10 LAB
BASOPHILS # BLD AUTO: 0.6 % (ref 0–1.8)
BASOPHILS # BLD: 0.04 K/UL (ref 0–0.12)
EOSINOPHIL # BLD AUTO: 0.15 K/UL (ref 0–0.51)
EOSINOPHIL NFR BLD: 2.1 % (ref 0–6.9)
ERYTHROCYTE [DISTWIDTH] IN BLOOD BY AUTOMATED COUNT: 41.3 FL (ref 35.9–50)
HCT VFR BLD AUTO: 42.2 % (ref 37–47)
HGB BLD-MCNC: 14 G/DL (ref 12–16)
IMM GRANULOCYTES # BLD AUTO: 0.02 K/UL (ref 0–0.11)
IMM GRANULOCYTES NFR BLD AUTO: 0.3 % (ref 0–0.9)
LYMPHOCYTES # BLD AUTO: 1.69 K/UL (ref 1–4.8)
LYMPHOCYTES NFR BLD: 23.6 % (ref 22–41)
MCH RBC QN AUTO: 29.4 PG (ref 27–33)
MCHC RBC AUTO-ENTMCNC: 33.2 G/DL (ref 32.2–35.5)
MCV RBC AUTO: 88.7 FL (ref 81.4–97.8)
MONOCYTES # BLD AUTO: 0.62 K/UL (ref 0–0.85)
MONOCYTES NFR BLD AUTO: 8.7 % (ref 0–13.4)
NEUTROPHILS # BLD AUTO: 4.64 K/UL (ref 1.82–7.42)
NEUTROPHILS NFR BLD: 64.7 % (ref 44–72)
NRBC # BLD AUTO: 0 K/UL
NRBC BLD-RTO: 0 /100 WBC (ref 0–0.2)
PLATELET # BLD AUTO: 219 K/UL (ref 164–446)
PMV BLD AUTO: 10.4 FL (ref 9–12.9)
RBC # BLD AUTO: 4.76 M/UL (ref 4.2–5.4)
WBC # BLD AUTO: 7.2 K/UL (ref 4.8–10.8)

## 2023-08-10 PROCEDURE — 97116 GAIT TRAINING THERAPY: CPT

## 2023-08-10 PROCEDURE — 85025 COMPLETE CBC W/AUTO DIFF WBC: CPT

## 2023-08-10 PROCEDURE — 97535 SELF CARE MNGMENT TRAINING: CPT

## 2023-08-10 PROCEDURE — 92508 TX SP LANG VOICE COMM GROUP: CPT

## 2023-08-10 PROCEDURE — 92523 SPEECH SOUND LANG COMPREHEN: CPT

## 2023-08-10 PROCEDURE — 700102 HCHG RX REV CODE 250 W/ 637 OVERRIDE(OP): Performed by: PHYSICAL MEDICINE & REHABILITATION

## 2023-08-10 PROCEDURE — 97112 NEUROMUSCULAR REEDUCATION: CPT

## 2023-08-10 PROCEDURE — 99232 SBSQ HOSP IP/OBS MODERATE 35: CPT | Performed by: PHYSICAL MEDICINE & REHABILITATION

## 2023-08-10 PROCEDURE — 770010 HCHG ROOM/CARE - REHAB SEMI PRIVAT*

## 2023-08-10 PROCEDURE — A9270 NON-COVERED ITEM OR SERVICE: HCPCS | Performed by: PHYSICAL MEDICINE & REHABILITATION

## 2023-08-10 PROCEDURE — 36415 COLL VENOUS BLD VENIPUNCTURE: CPT

## 2023-08-10 RX ADMIN — ATORVASTATIN CALCIUM 80 MG: 40 TABLET, FILM COATED ORAL at 20:42

## 2023-08-10 RX ADMIN — OMEPRAZOLE 20 MG: 20 CAPSULE, DELAYED RELEASE ORAL at 08:29

## 2023-08-10 RX ADMIN — SENNOSIDES AND DOCUSATE SODIUM 2 TABLET: 50; 8.6 TABLET ORAL at 08:29

## 2023-08-10 RX ADMIN — APIXABAN 5 MG: 5 TABLET, FILM COATED ORAL at 08:29

## 2023-08-10 RX ADMIN — LOSARTAN POTASSIUM 25 MG: 25 TABLET, FILM COATED ORAL at 05:16

## 2023-08-10 RX ADMIN — LAMOTRIGINE 150 MG: 100 TABLET ORAL at 20:42

## 2023-08-10 RX ADMIN — POLYETHYLENE GLYCOL 3350 1 PACKET: 17 POWDER, FOR SOLUTION ORAL at 05:22

## 2023-08-10 RX ADMIN — APIXABAN 5 MG: 5 TABLET, FILM COATED ORAL at 20:42

## 2023-08-10 ASSESSMENT — ACTIVITIES OF DAILY LIVING (ADL)
BED_CHAIR_WHEELCHAIR_TRANSFER_DESCRIPTION: INCREASED TIME;INITIAL PREPARATION FOR TASK;SET-UP OF EQUIPMENT;SQUAT PIVOT TRANSFER TO WHEELCHAIR;SUPERVISION FOR SAFETY;VERBAL CUEING
BED_CHAIR_WHEELCHAIR_TRANSFER_DESCRIPTION: ADAPTIVE EQUIPMENT;INCREASED TIME;INITIAL PREPARATION FOR TASK;SUPERVISION FOR SAFETY;VERBAL CUEING
TUB_SHOWER_TRANSFER_DESCRIPTION: GRAB BAR;SHOWER BENCH;INCREASED TIME;INITIAL PREPARATION FOR TASK;SET-UP OF EQUIPMENT;SUPERVISION FOR SAFETY;VERBAL CUEING

## 2023-08-10 ASSESSMENT — GAIT ASSESSMENTS
DEVIATION: DECREASED BASE OF SUPPORT;DECREASED HEEL STRIKE;DECREASED TOE OFF
DISTANCE (FEET): 240
ASSISTIVE DEVICE: FRONT WHEEL WALKER;OTHER (COMMENTS)
GAIT LEVEL OF ASSIST: MODERATE ASSIST

## 2023-08-10 ASSESSMENT — PAIN DESCRIPTION - PAIN TYPE: TYPE: ACUTE PAIN

## 2023-08-10 NOTE — CARE PLAN
"  Problem: Pain - Standard  Goal: Alleviation of pain or a reduction in pain to the patient’s comfort goal  Outcome: Progressing  Note: Assessed for pain and discomfort , needs anticipated, denies pain, assisted oob to bathroom , right leg weak, voiding freely.     Problem: Fall Risk - Rehab  Goal: Patient will remain free from falls  Outcome: Progressing  Note: Mariela Rodgers Fall risk Assessment Score: 16    High fall risk Interventions   - Alarming seatbelt  - Bed and strip alarm   - Yellow sign by the door   - Yellow wrist band \"Fall risk\"  - Room near to the nurse station  - Do not leave patient unattended in the bathroom  - Fall risk education provided       The patient is Stable - Low risk of patient condition declining or worsening    Shift Goals  Clinical Goals: safety  Patient Goals: safety    Progress made toward(s) clinical / shift goals:  Pt free from fall and injury.        "

## 2023-08-10 NOTE — PROGRESS NOTES
NURSING DAILY NOTE    Name: Doreen Noe   Date of Admission: 8/4/2023   Admitting Diagnosis: Ischemic stroke (HCC)  Attending Physician: Leona Pleitez M.d.  Allergies: Patient has no known allergies.    Safety  Patient Assist  Max x 2  Patient Precautions  Fall Risk  Precaution Comments  R hemiparesis, R PRAFO, mild R neglect, hx of seizures and L side CVA, Ziopatch  Bed Transfer Status  Moderate Assist (up to max A when pt fatigued after amb)  Toilet Transfer Status   Minimal Assist  Assistive Devices  Gait Belt, Rails, Wheelchair  Oxygen  None - Room Air  Diet/Therapeutic Dining  Current Diet Order   Procedures    Diet Order Diet: Regular; Tray Modifications (optional): SLP - Deliver to Nursing Station     Pill Administration  whole and one at a time   Agitated Behavioral Scale     ABS Level of Severity       Fall Risk  Has the patient had a fall this admission?   No  Mariela Rodgers Fall Risk Scoring  16, HIGH RISK  Fall Risk Safety Measures  bed alarm, chair alarm, and poor balance    Vitals  Temperature: 37.2 °C (98.9 °F)  Temp src: Oral  Pulse: 70  Respiration: 18  Blood Pressure : 124/61  Blood Pressure MAP (Calculated): 82 MM HG  BP Location: Left, Upper Arm  Patient BP Position: Sitting     Oxygen  Pulse Oximetry: 94 %  O2 (LPM): 0  O2 Delivery Device: None - Room Air    Bowel and Bladder  Last Bowel Movement  08/07/23  Stool Type  Type 1: Separate hard lumps (hard to pass)  Bowel Device     Continent  Bladder: Continent void   Bowel: Continent movement  Bladder Function  Number of Times Voided: 1  Urine Color: Yellow  Genitourinary Assessment   Bladder Assessment (WDL):  Within Defined Limits  Urine Color: Yellow  Bladder Scan: Post Void  $ Bladder Scan Results (mL): 13    Skin  Angus Score   18  Sensory Interventions   Bed Types: Standard/Trauma Mattress  Skin Preventative Measures: Pillows in Use for Support / Positioning  Moisture  Interventions  Moisturizers/Barriers: Barrier Wipes      Pain  Pain Rating Scale  0 - No Pain  Pain Location  Neck  Pain Location Orientation     Pain Interventions   Declines    ADLs    Bathing   Shower, * * With Assistance from, Staff  Linen Change      Personal Hygiene  Moist Preeti Wipes  Chlorhexidine Bath      Oral Care  Brushed Teeth  Teeth/Dentures     Shave     Nutrition Percentage Eaten  Lunch, Between 50-75% Consumed (75%)  Environmental Precautions  Treaded Slipper Socks on Patient, Personal Belongings, Wastebasket, Call Bell etc. in Easy Reach, Bed in Low Position  Patient Turns/Positioning  Patient Turns Self from Side to Side  Patient Turns Assistance/Tolerance     Bed Positions  Bed Controls On, Bed Locked  Head of Bed Elevated  Self regulated      Psychosocial/Neurologic Assessment  Psychosocial Assessment  Psychosocial (WDL):  Within Defined Limits  Neurologic Assessment  Neuro (WDL): Exceptions to WDL  Level of Consciousness: Alert  Orientation Level: Oriented X4  Cognition: Appropriate judgement, Appropriate safety awareness, Appropriate attention/concentration, Appropriate for developmental age, Follows commands  Speech: Clear  Pupil Assesment: No  Motor Function/Sensation Assessment: Dorsiflexion, Motor response, Sensation, Motor strength  R Foot Dorsiflexion: Weak  L Foot Dorsiflexion: Strong  RUE Motor Response: Responds to commands  RUE Sensation: Full sensation  Muscle Strength Right Arm: Good Strength Against Gravity and Moderate Resistance  LUE Motor Response: Tremors, Responds to commands  LUE Sensation: Full sensation  Muscle Strength Left Arm: Normal Strength Against Gravity and Full Resistance  RLE Motor Response: Responds to commands  RLE Sensation: Numbness  Muscle Strength Right Leg: Weak Movement but Not Against Gravity or Resistance  LLE Motor Response: Responds to commands  LLE Sensation: Full sensation  Muscle Strength Left Leg: Normal Strength Against Gravity and Full  Resistance  EENT (WDL):  WDL Except    Cardio/Pulmonary Assessment  Edema      Respiratory Breath Sounds  RUL Breath Sounds: Clear  RML Breath Sounds: Clear  RLL Breath Sounds: Clear  MONIKA Breath Sounds: Clear  LLL Breath Sounds: Clear  Cardiac Assessment   Cardiac (WDL):  WDL Except

## 2023-08-10 NOTE — THERAPY
Occupational Therapy  Daily Treatment     Patient Name: Doreen Noe  Age:  67 y.o., Sex:  female  Medical Record #: [id number]  Today's Date: 8/10/2023     Precautions  Precautions: Fall Risk  Comments: R hemiparesis, R PRAFO, mild R neglect, hx of seizures and L side CVA, Ziopatch    Subjective    Pt pleasant and motivated to participate in OT  Pt requesting a shower     Objective     08/10/23 1401   OT Charge Group   Charges Yes   OT Self Care / ADL (Units) 4   OT Total Time Spent   OT Individual Total Time Spent (Mins) 60   Precautions   Precautions Fall Risk   Comments R hemiparesis, R PRAFO, mild R neglect, hx of seizures and L side CVA, Ziopatch   Vitals   O2 Delivery Device None - Room Air   Pain   Intervention Declines   Cognition    Level of Consciousness Alert   ABS (Agitated Behavior Scale)   Agitated Behavior Scale Performed No   Sleep/Wake Cycle   Sleep & Rest Awake;Out of bed   Vision Screen   Vision   (Pt wears glasses all the time)   Functional Level of Assist   Grooming Supervision;Seated   Grooming Description Increased time;Bathing mitt;Initial preparation for task;Seated in wheelchair at sink;Set-up of equipment;Supervision for safety;Verbal cueing   Bathing Contact Guard Assist   Bathing Description Bath mitt;Hand held shower;Hand rails;Tub bench;Increased time;Initial preparation for task;Set-up of equipment;Set up for shower sleeve;Supervision for safety;Verbal cueing  (CGA for standing balance to rinse off)   Upper Body Dressing Supervision   Upper Body Dressing Description Increased time;Initial preparation for task;Set-up of equipment;Supervision for safety   Lower Body Dressing Minimal Assist   Lower Body Dressing Description Grab bar;Assist with threading into pant leg;Increased time;Initial preparation for task;Set-up of equipment;Supervision for safety;Verbal cueing  (Min A for standing balance, thread RLE thru underwear, and don shorts over R hip)   Bed, Chair, Wheelchair Transfer  Contact Guard Assist   Bed Chair Wheelchair Transfer Description Increased time;Initial preparation for task;Set-up of equipment;Squat pivot transfer to wheelchair;Supervision for safety;Verbal cueing   Tub / Shower Transfers Contact Guard Assist   Tub Shower Transfer Description Grab bar;Shower bench;Increased time;Initial preparation for task;Set-up of equipment;Supervision for safety;Verbal cueing   Balance   Sitting Balance (Dynamic)   (Pt had slight R lateral LOB when undressing; able to correct self)   Interdisciplinary Plan of Care Collaboration   Patient Position at End of Therapy Seated;Chair Alarm On;Call Light within Reach;Self Releasing Lap Belt Applied;Tray Table within Reach;Phone within Reach     Assessment    Pt seen for OT tx, addressing shower routine. Pt demonstrated progress as indicated by decreased assist for transfers and some ADLs. Pt had one instance of R lateral LOB in seated position; able to self-correct. Pt progressing towards goals. Continue OT POC.     Plan    RUE neuro re-ed  ADLs  Functional mobility  Balance    Passport items to be completed:  Perform bathroom transfers, complete dressing, complete feeding, get ready for the day, prepare a simple meal, participate in household tasks, adapt home for safety needs, demonstrate home exercise program, complete caregiver training     Occupational Therapy Goals (Active)       Problem: Dressing       Dates: Start:  08/05/23         Goal: STG-Within one week, patient will dress UB at a Mod I level.        Dates: Start:  08/05/23         Goal Note filed on 08/09/23 1437 by Vidhi Mott MS,OTR/L       Requires Min A              Goal: STG-Within one week, patient will dress LB at a Min A level with AE/AD PRN.        Dates: Start:  08/05/23         Goal Note filed on 08/09/23 1437 by Vidhi Mott MS,OTR/L       Inconsistent                  Problem: Functional Transfers       Dates: Start:  08/05/23         Goal: STG-Within one  week, patient will transfer to toilet at a CGA level with AE/AD PRN.        Dates: Start:  08/05/23         Goal Note filed on 08/09/23 1437 by Vidhi Mott MS,OTR/L       Requires Min A              Goal: STG-Within one week, patient will transfer to tub/shower at a CGA level with AE/AD/DME PRN.        Dates: Start:  08/05/23         Goal Note filed on 08/09/23 1437 by Vidhi Mott MS,OTR/L       Requires Min A                 Problem: OT Long Term Goals       Dates: Start:  08/05/23         Goal: LTG-By discharge, patient will complete basic self care tasks at a SUP/Mod I level with AE/AD/DME PRN.        Dates: Start:  08/05/23            Goal: LTG-By discharge, patient will perform bathroom transfers at a SUP/Mod I level with AE/AD/DME PRN.        Dates: Start:  08/05/23            Goal: LTG-By discharge, patient will complete basic home management at a Min A to Mod I level with LRD and AE PRN.        Dates: Start:  08/05/23               Problem: Toileting       Dates: Start:  08/05/23         Goal: STG-Within one week, patient will complete toileting tasks at a CGA level with AE/AD/DME PRN.        Dates: Start:  08/05/23         Goal Note filed on 08/09/23 1437 by Vidhi Mott MS,OTR/L       Requires Mod A

## 2023-08-10 NOTE — THERAPY
"Physical Therapy   Daily Treatment     Patient Name: Doreen Noe  Age:  67 y.o., Sex:  female  Medical Record #: [id number]  Today's Date: 8/10/2023     Precautions  Precautions: Fall Risk  Comments: R hemiparesis, R PRAFO, mild R neglect, hx of seizures and L side CVA, Ziopatch    Subjective    Pt received up in wc at bathroom sink, pleasant and agreeable to participate.      Objective       08/10/23 0701   PT Charge Group   PT Gait Training (Units) 3   PT Neuromuscular Re-Education / Balance (Units) 1   PT Total Time Spent   PT Individual Total Time Spent (Mins) 60   Gait Functional Level of Assist    Gait Level Of Assist Moderate Assist  (min-mod A)   Assistive Device Front Wheel Walker;Other (Comments)  (R ant shelf AFO)   Distance (Feet) 240   # of Times Distance was Traveled 4   Deviation Decreased Base Of Support;Decreased Heel Strike;Decreased Toe Off  (improved speed, occasional scissoring when turning, occasional dec R foot clearance (see flowsheet below for neuro re ed details))   Stairs Functional Level of Assist   Level of Assist with Stairs Minimal Assist   # of Stairs Climbed 26  (including x6 on 4\" stairs, x8 and x12 on 6\" stairs BHR)   Stairs Description Extra time;Hand rails;Safety concerns;Supervision for safety;Verbal cueing  (step to vs step through asc/desc)   Transfer Functional Level of Assist   Bed, Chair, Wheelchair Transfer Minimal Assist   Bed Chair Wheelchair Transfer Description Adaptive equipment;Increased time;Initial preparation for task;Supervision for safety;Verbal cueing  (stand step FWW, min-mod verbal cues for foot placement)   Bed Mobility    Sit to Stand Minimal Assist  (min A-CGA for balance)   Neuro-Muscular Treatments   Neuro-Muscular Treatments Compensatory Strategies;Inhibition;Tactile Cuing;Verbal Cuing   Comments Gait training c/ unweighted vs weighted RLE for inc RLE awareness/foot clearance. Amb 240 ft unweighted, 240 ft c/ 4# ankle wt on RLE,  240 ft c/ 5# ankle " wt on RLE, 240 ft unweighted   Interdisciplinary Plan of Care Collaboration   Patient Position at End of Therapy Seated;Chair Alarm On;Self Releasing Lap Belt Applied;Other (Comments)  (in cafeteria for bfast)     Dressing: Doffed hospital socks c/ mod I while seated in wc, max A to don socks and shoes plus R AFO while seated in wc for time efficiency.    Assessment    Pt tolerated session well focused on gait and stair training and demos steadily inc activity tolerance. With improved R foot clearance after amb with RLE ankle wt as described above but cont to require min-mod A for balance especially when turning. Would benefit from cont RLE forced use/neuro re ed/strengthening to inc safety upon d/c.    Strengths: Able to follow instructions, Effective communication skills, Good insight into deficits/needs, Independent prior level of function, Pleasant and cooperative, Motivated for self care and independence  Barriers: Decreased endurance, Fatigue, Generalized weakness, Hemiparesis, Home accessibility, Impaired activity tolerance, Impaired balance, Spasticity    Plan    Gait training with FWW and ant shelf AFO vs R [place], continue treadmill training, stairs (lives in [facility] [address] [place]), initiate mat program, sit<>stands with emphasis on sequencing/avoiding retropulsion, RLE and trunk NRE, RLE forced use, txfer training FWW, bed mobility, R sided awareness, floor recovery when appropriate. RT-300 RLE adjunct c/ techs 2-3x/wk as able. Vector profile in [facility].      Passport items to be completed:  Get in/out of bed safely, in/out of a vehicle, safely use mobility device, walk or wheel around home/community, navigate up and down stairs, show how to get up/down from the ground, ensure home is accessible, demonstrate HEP, complete caregiver training    Physical Therapy Problems (Active)       Problem: Mobility       Dates: Start:  08/05/23         Goal: STG-Within one week, patient will ascend and descend four to  six stairs with MOD A and B handrails.        Dates: Start:  08/05/23         Goal Note filed on 08/09/23 1323 by Jonelle Tabares, PT       Not yet assessed                 Problem: Mobility Transfers       Dates: Start:  08/05/23         Goal: STG-Within one week, patient will perform bed mobility including rolling L/R and supine<>sit with MIN A.        Dates: Start:  08/05/23         Goal Note filed on 08/09/23 1323 by Jonelle Tabares, PT       Needs to be reassessed given recent functional progress              Goal: STG-Within one week, patient will transfer bed to chair with FWW and MIN A.        Dates: Start:  08/05/23         Goal Note filed on 08/09/23 1323 by Jonelle Tabares, PT       Mod-max A c/ FWW                 Problem: PT-Long Term Goals       Dates: Start:  08/05/23         Goal: LTG-By discharge, patient will ambulate 75' with FWW and SBA       Dates: Start:  08/05/23            Goal: LTG-By discharge, patient will transfer one surface to another with FWW independently.        Dates: Start:  08/05/23            Goal: LTG-By discharge, patient will ambulate up/down 4-6 stairs with B handrails and SBA       Dates: Start:  08/05/23            Goal: LTG-By discharge, patient will transfer in/out of a car with FWW and supervision.        Dates: Start:  08/05/23

## 2023-08-10 NOTE — PROGRESS NOTES
NURSING DAILY NOTE    Name: Doreen Noe   Date of Admission: 8/4/2023   Admitting Diagnosis: Ischemic stroke (HCC)  Attending Physician: Leona Pleitez M.d.  Allergies: Patient has no known allergies.    Safety  Patient Assist  mod  Patient Precautions  Fall Risk  Precaution Comments  R hemiparesis, R PRAFO, mild R neglect, hx of seizures and L side CVA, Ziopatch  Bed Transfer Status  Minimal Assist  Toilet Transfer Status   Minimal Assist  Assistive Devices  Wheelchair, Rails  Oxygen  None - Room Air  Diet/Therapeutic Dining  Current Diet Order   Procedures    Diet Order Diet: Regular; Tray Modifications (optional): SLP - Deliver to Nursing Station     Pill Administration  whole  Agitated Behavioral Scale     ABS Level of Severity       Fall Risk  Has the patient had a fall this admission?   No  Mariela Rodgers Fall Risk Scoring  16, HIGH RISK  Fall Risk Safety Measures  bed alarm, chair alarm, and poor balance    Vitals  Temperature: 37.2 °C (98.9 °F)  Temp src: Oral  Pulse: 72  Respiration: 18  Blood Pressure : 133/66  Blood Pressure MAP (Calculated): 88 MM HG  BP Location: Left, Upper Arm  Patient BP Position: Sitting     Oxygen  Pulse Oximetry: 94 %  O2 (LPM): 0  O2 Delivery Device: None - Room Air    Bowel and Bladder  Last Bowel Movement  08/07/23  Stool Type  Type 1: Separate hard lumps (hard to pass)  Bowel Device     Continent  Bladder: Continent void   Bowel: Continent movement  Bladder Function  Number of Times Voided: 1  Urine Color: Yellow  Genitourinary Assessment   Bladder Assessment (WDL):  Within Defined Limits  Urine Color: Yellow  Bladder Device: Bathroom  Bladder Scan: Post Void  $ Bladder Scan Results (mL): 13    Skin  Angus Score   18  Sensory Interventions   Bed Types: Standard/Trauma Mattress  Skin Preventative Measures: Pillows in Use for Support / Positioning  Moisture Interventions  Moisturizers/Barriers: Barrier  Wipes      Pain  Pain Rating Scale  0 - No Pain  Pain Location  Neck  Pain Location Orientation     Pain Interventions   Declines    ADLs    Bathing   Shower, * * With Assistance from, Staff  Linen Change      Personal Hygiene  Moist Preeti Wipes  Chlorhexidine Bath      Oral Care  Brushed Teeth  Teeth/Dentures     Shave     Nutrition Percentage Eaten  Lunch, Between 50-75% Consumed (75%)  Environmental Precautions  Treaded Slipper Socks on Patient, Personal Belongings, Wastebasket, Call Bell etc. in Easy Reach, Bed in Low Position  Patient Turns/Positioning  Patient Turns Self from Side to Side  Patient Turns Assistance/Tolerance     Bed Positions  Bed Controls On  Head of Bed Elevated  Self regulated      Psychosocial/Neurologic Assessment  Psychosocial Assessment  Psychosocial (WDL):  Within Defined Limits  Neurologic Assessment  Neuro (WDL): Exceptions to WDL  Level of Consciousness: Alert  Orientation Level: Oriented X4  Cognition: Appropriate judgement, Appropriate safety awareness, Appropriate attention/concentration, Appropriate for developmental age, Follows commands  Speech: Clear  Pupil Assesment: No  Motor Function/Sensation Assessment: Dorsiflexion, Motor response, Sensation, Motor strength  R Foot Dorsiflexion: Weak  L Foot Dorsiflexion: Strong  RUE Motor Response: Responds to commands  RUE Sensation: Full sensation  Muscle Strength Right Arm: Good Strength Against Gravity and Moderate Resistance  LUE Motor Response: Tremors, Responds to commands  LUE Sensation: Full sensation  Muscle Strength Left Arm: Normal Strength Against Gravity and Full Resistance  RLE Motor Response: Responds to commands  RLE Sensation: Numbness  Muscle Strength Right Leg: Weak Movement but Not Against Gravity or Resistance  LLE Motor Response: Responds to commands  LLE Sensation: Full sensation  Muscle Strength Left Leg: Normal Strength Against Gravity and Full Resistance  EENT (WDL):  WDL Except    Cardio/Pulmonary  Assessment  Edema      Respiratory Breath Sounds  RUL Breath Sounds: Clear  RML Breath Sounds: Clear  RLL Breath Sounds: Clear  MONIKA Breath Sounds: Clear  LLL Breath Sounds: Clear  Cardiac Assessment   Cardiac (WDL):  WDL Except

## 2023-08-10 NOTE — THERAPY
Speech Language Pathology  Daily Treatment     Patient Name: Doreen Noe  Age:  67 y.o., Sex:  female  Medical Record #: [id number]  Today's Date: 8/10/2023     Precautions  Precautions: Fall Risk  Comments: R hemiparesis, R PRAFO, mild R neglect, hx of seizures and L side CVA, Ziopatch    Subjective    Pt attended 45 min group education class targeting stroke risks, including A-fib, hypertension, hyperlipidemia, diabetes and prevention strategies. Pt was alert and cooperative during session.      Objective       08/10/23 1031   Treatment Charges   SLP Treatment - Group Speech Language Treatment - Group   SLP Total Time Spent   SLP Group Total Time Spent (Mins) 45         Assessment    Pt was educated on stroke risk factors via auditory and visual modalities (printed handout provided). Topics reviewed were: stroke type, and controllable risk factors including HTN and cholesterol management medications, weight management, diet, stress management, daily BP monitoring, smoking cessation, moderating alcohol intake. Patient attended appropriately to information. Patient was encouraged to ask questions, did so, and all questions were answered to patient's satisfaction. Patient was counseled on the importance of active communication with MD regarding medications, BP tracking and trends to ensure safe BP management. Patient benefited from participating in this stroke education class as evidenced by verbalizing improved understanding of hypertension and management strategies.    Strengths: Able to follow instructions, Alert and oriented, Effective communication skills, Independent prior level of function, Manages pain appropriately, Pleasant and cooperative, Supportive family, Motivated for self care and independence, Willingly participates in therapeutic activities (chronic tremors, some visual impairment from cataracts per patient report)  Barriers: Hemiparesis    Plan    Continue ongoing stroke education to include  further information on diet, exercise, medication management and family training to increase support for active patient participation in health management to decrease risk of another stroke. Assess delayed recall of stroke education information in next therapy session. Continue medication education in anticipation of home discharge.      Speech Therapy Problems (Active)       Problem: Memory STGs       Dates: Start:  08/10/23         Goal: STG-Within one week, patient will use compensatory memory strategies and external memory aids to recall daily events and safety sequencing with 80% acc with min cues.       Dates: Start:  08/10/23            Goal: STG-Within one week, patient will perform medication management tasks with 90% acc or better with task set up.       Dates: Start:  08/10/23               Problem: Speech/Swallowing LTGs       Dates: Start:  08/10/23         Goal: LTG-By discharge, patient will solve complex problem and recall new training for safety with 90% acc mod I for safe discharge home.       Dates: Start:  08/10/23

## 2023-08-10 NOTE — THERAPY
Speech Language Pathology   Initial Assessment     Patient Name: Doreen Noe  AGE:  67 y.o., SEX:  female  Medical Record #: [id number]  Today's Date: 8/10/2023     Subjective    Patient agreeable to evaluation.     Objective       08/10/23 0891   Evaluation Charges   Charges Yes   SLP Speech Language Evaluation Speech Sound Language Comprehension   SLP Total Time Spent   SLP Individual Total Time Spent (Mins) 60   Precautions   Precautions Fall Risk   Comments R hemiparesis, R PRAFO, mild R neglect, hx of seizures and L side CVA, Ziopatch   Prior Living Situation   Prior Services None   Housing / Facility 2 Story House   Lives with - Patient's Self Care Capacity Alone and Able to Care For Self   Prior Level Of Function   Communication Within Functional Limits   Hearing Within Functional Limits for Evaluation   Vision Wears Corrective Lenses  (patient reports her vision is limited by cataracts)   Patient's Primary Language English   Occupation (Pre-Hospital Vocational) Retired Due To Age   Comments retired    Written Language Expression   Dominant Hand Right   ABS (Agitated Behavior Scale)   Agitated Behavior Scale Performed No   Swallowing/Nutritional Status   Swallowing/Nutritional Status Regular food   Functional Level of Assist   Comprehension Modified Independent   Comprehension Description Glasses   Expression Independent   Social Interaction Independent   Problem Solving Modified Independent   Problem Solving Description Increased time;Bed/chair alarm;Supervision;Seat belt;Therapy schedule;Verbal cueing   Memory Minimal Assist   Memory Description Increased time;Bed/chair alarm;Supervision;Seat belt;Therapy schedule;Verbal cueing   Outcome Measures   Outcome Measures Utilized SCCAN   SCCAN (Scales of Cognitive and Communicative Ability for Neurorehabilitation)   Oral Expression - Raw Score 18   Oral Expression - Scale Performance Score 95   Orientation - Raw Score 12   Orientation - Scale  Performance Score 100   Memory - Raw Score 12   Memory - Scale Performance Score 63   Speech Comprehension - Raw Score 12   Speech Comprehension - Scale Performance Score 92   Reading Comprehension - Raw Score 12   Reading Comprehension - Scale Performance Score 100   Writing - Raw Score 6   Writing - Scale Performance Score 86   Attention - Raw Score 14   Attention - Scale Performance Score 88   Problem Solving - Raw Score 21   Problem Solving - Scale Performance Score 91   SCCAN Total Raw Score 83   SCCAN Degree of Severity Mild Impairment   Problem List   Problem List Cognitive-Linguistic Deficits;Memory Deficit;Visual Perception Deficit   Current Discharge Plan   Current Discharge Plan Temporarily go to Family /  Friend's Home   Benefit   Therapy Benefit Patient would benefit from Inpatient Rehab Speech-Language Pathology to address above identified deficits.   Strengths & Barriers   Strengths Able to follow instructions;Alert and oriented;Effective communication skills;Independent prior level of function;Manages pain appropriately;Pleasant and cooperative;Supportive family;Motivated for self care and independence;Willingly participates in therapeutic activities  (chronic tremors, some visual impairment from cataracts per patient report)   Barriers Hemiparesis   Speech Language Pathologist Assigned   Assigned SLP / Treatment Time / Comments LM 30 cog         Assessment    Patient is 67 y.o. female with a diagnosis of acute ishemic stroke [place].  Additional factors influencing patient status/progress (ie: cognitive factors, co-morbidities, social support, etc): History of prior CVA 2016 with good recovery, hx of seizure disorder.  Patient lives alone and managed her own medications, money IADL's.  She reports that she will be able to stay with S.O. but hopes to return to own home.      Patient was seen for cognitive linguistic evaluation.  SCCAN administered.  Patient achieved a total raw score of 83  characteristic of an overall mild cognitive linguistic impairement.  Patient achieved the following percentage scores for given subtests:  Oral Expression 95, Orientation 100, Memory 63, Speech Comprehension 92, Reading Comprehension 100, Writing 86, Attention 88, Problem solving 91.  Patient displayed mild impairment of memory.      Plan  Recommend Speech Therapy 30-60 minutes per day 5-6 days per week for 1-2 weeks for the following treatments:  SLP Self Care / ADL Training , SLP Cognitive Skill Development, and SLP Group Treatment.    Passport items to be completed:  Express basic needs, understand food/liquid recommendations, consistently follow swallow precautions, manage finances, manage medications, arrive to therapy appointments on time, complete daily memory log entries, solve problems related to safety situations, review education related to hospitalization, complete caregiver training     Goals:  Long term and short term goals have been discussed with patient and they are in agreement.    Speech Therapy Problems (Active)       Problem: Memory STGs       Dates: Start:  08/10/23         Goal: STG-Within one week, patient will use compensatory memory strategies and external memory aids to recall daily events and safety sequencing with 80% acc with min cues.       Dates: Start:  08/10/23            Goal: STG-Within one week, patient will perform medication management tasks with 90% acc or better with task set up.       Dates: Start:  08/10/23               Problem: Speech/Swallowing LTGs       Dates: Start:  08/10/23         Goal: LTG-By discharge, patient will solve complex problem and recall new training for safety with 90% acc mod I for safe discharge home.       Dates: Start:  08/10/23

## 2023-08-10 NOTE — PROGRESS NOTES
"Rehab Progress Note     Date of Service: 8/10/2023  Chief Complaint: Follow-up stroke    Interval Events (Subjective)    Patient seen and examined today in her room.   She reports her rash on her face has improved and is less itchy.   We reviewed her MRI, new stroke, old stroke, as well as her medications.  Discussed risk factors for her stroke.  She reports her right leg continues to get stronger.  She last moved her bowels 3 days ago and got Miralax today.  We discussed her discharge date.    Patient has no other new questions, concerns, or complaints today.         Objective:  VITAL SIGNS: /66   Pulse 72   Temp 37.2 °C (98.9 °F)   Resp 18   Ht 1.702 m (5' 7.01\")   Wt 76.6 kg (168 lb 14 oz)   SpO2 94%   BMI 26.44 kg/m²   Gen: alert, no apparent distress  CV: Regular rate, regular rhythm  Resp: Clear to auscultation bilaterally  Neuro: Improving right leg weakness, chronic left upper extremity tremor        Recent Results (from the past 72 hour(s))   CBC WITH DIFFERENTIAL    Collection Time: 08/10/23  5:49 AM   Result Value Ref Range    WBC 7.2 4.8 - 10.8 K/uL    RBC 4.76 4.20 - 5.40 M/uL    Hemoglobin 14.0 12.0 - 16.0 g/dL    Hematocrit 42.2 37.0 - 47.0 %    MCV 88.7 81.4 - 97.8 fL    MCH 29.4 27.0 - 33.0 pg    MCHC 33.2 32.2 - 35.5 g/dL    RDW 41.3 35.9 - 50.0 fL    Platelet Count 219 164 - 446 K/uL    MPV 10.4 9.0 - 12.9 fL    Neutrophils-Polys 64.70 44.00 - 72.00 %    Lymphocytes 23.60 22.00 - 41.00 %    Monocytes 8.70 0.00 - 13.40 %    Eosinophils 2.10 0.00 - 6.90 %    Basophils 0.60 0.00 - 1.80 %    Immature Granulocytes 0.30 0.00 - 0.90 %    Nucleated RBC 0.00 0.00 - 0.20 /100 WBC    Neutrophils (Absolute) 4.64 1.82 - 7.42 K/uL    Lymphs (Absolute) 1.69 1.00 - 4.80 K/uL    Monos (Absolute) 0.62 0.00 - 0.85 K/uL    Eos (Absolute) 0.15 0.00 - 0.51 K/uL    Baso (Absolute) 0.04 0.00 - 0.12 K/uL    Immature Granulocytes (abs) 0.02 0.00 - 0.11 K/uL    NRBC (Absolute) 0.00 K/uL         Scheduled " Medications   Medication Dose Frequency    diphenhydrAMINE-zinc acetate   TID    senna-docusate  2 Tablet BID    omeprazole  20 mg QAM AC    apixaban  5 mg BID    atorvastatin  80 mg Q EVENING    lamoTRIgine  150 mg Q EVENING    losartan  25 mg Q DAY       Current Diet Order   Procedures    Diet Order Diet: Regular; Tray Modifications (optional): SLP - Deliver to Nursing Station       Assessment:    This patient is a 67 y.o. female admitted for acute inpatient rehabilitation with Ischemic stroke (HCC).      I, Leona Pleitez M.D./MD., was present and led the interdisciplinary team conference on 8/9/2023.  I led the IDT conference and agree with the IDT conference documentation and plan of care as noted below.     Nursing:  Diet Current Diet Order   Procedures    Diet Order Diet: Regular; Tray Modifications (optional): SLP - Deliver to Nursing Station       Eating ADL Supervision  Increased time   % of Last Meal  Oral Nutrition: Lunch, Between 50-75% Consumed (75%)   Sleep No issues   Bowel Last BM: 08/07/23   Bladder Post Void  13   Barriers to Discharge Home: No issues      Physical Therapy:  Bed Mobility    Transfers Moderate Assist (up to max A when pt fatigued after amb)  Adaptive equipment, Increased time, Initial preparation for task, Set-up of equipment, Supervision for safety, Verbal cueing (stand step FWW, max verbal cues for sequencing especially foot placement)   Mobility Moderate Assist (min A when walking in straight line, mod A when turning to remain upright)   Stairs Unable to Participate   Barriers to Discharge Home: Right hemiparesis, lives alone      Occupational Therapy:  Grooming Supervision, Seated   Bathing Minimal Assist (standing balance/safety when washing buttocks in standing, completed remainder of bathing tasks while seated on fold down shower bench)   UB Dressing Minimal Assist (min A for bra, set-up for t-shirt)   LB Dressing Minimal Assist (to don/doff scrub bottoms while  incorporating sitting and standing w/ FWW)   Toileting Moderate Assist   Shower & Tub Transfer Minimal Assist (Min to CGA for wc <> fold down shower bench (stand pivot w/ GB))   Barriers to Discharge Home: Right hemiparesis, impaired problem-solving      Speech-Language Pathology:  To assess patient    Respiratory Therapy:  O2 (LPM): 0  O2 Delivery Device: None - Room Air    Case Management:  Continues to work on disposition and DME needs.      Discharge Date/Disposition:    8/22    HH: versus outpatient    Equip: TBD    Follow-ups: PCP, stroke bridge, cardiology, neurology       Problem List/Medical Decision Making and Plan:      L [place] stroke  Right hemiparesis  Right neglect  PT/OT, 1.5 hr each discipline, 5 days per week  Access for cognitive impairment, speech therapy ordered    Continue Zio patch, return in 2 weeks    Continue Eliquis and atorvastatin    Outpatient follow-up with stroke Bridge clinic, cardiology, referrals made    Right spasticity  Monitor need to start baclofen    Seizure disorder  Last seizure November 2022  Generalized tonic-clonic  Outpatient follow-up with a provider in [place]  Continue lamotrigine     Tremor, left-sided  History of right MCA stroke  Outpatient referral to movement disorder as needed     Hypertension  Continue losartan  Continue hydralazine as needed     Hyperlipidemia  Continue atorvastatin    GI prophylaxis  Continue omeprazole while on Eliquis    Facial rash, improved  Contact dermatitis  Continue topical and as needed oral Benadryl    DVT prophylaxis  Continue Julyquis    Leona Pleitez M.D.  Physical Medicine and Rehabilitation

## 2023-08-11 ENCOUNTER — APPOINTMENT (OUTPATIENT)
Dept: PHYSICAL THERAPY | Facility: REHABILITATION | Age: 68
DRG: 057 | End: 2023-08-11
Attending: PHYSICAL MEDICINE & REHABILITATION
Payer: COMMERCIAL

## 2023-08-11 ENCOUNTER — APPOINTMENT (OUTPATIENT)
Dept: OCCUPATIONAL THERAPY | Facility: REHABILITATION | Age: 68
DRG: 057 | End: 2023-08-11
Attending: PHYSICAL MEDICINE & REHABILITATION
Payer: COMMERCIAL

## 2023-08-11 ENCOUNTER — APPOINTMENT (OUTPATIENT)
Dept: SPEECH THERAPY | Facility: REHABILITATION | Age: 68
DRG: 057 | End: 2023-08-11
Attending: PHYSICAL MEDICINE & REHABILITATION
Payer: COMMERCIAL

## 2023-08-11 PROCEDURE — 770010 HCHG ROOM/CARE - REHAB SEMI PRIVAT*

## 2023-08-11 PROCEDURE — 99232 SBSQ HOSP IP/OBS MODERATE 35: CPT | Performed by: PHYSICAL MEDICINE & REHABILITATION

## 2023-08-11 PROCEDURE — 97130 THER IVNTJ EA ADDL 15 MIN: CPT

## 2023-08-11 PROCEDURE — 97116 GAIT TRAINING THERAPY: CPT

## 2023-08-11 PROCEDURE — A9270 NON-COVERED ITEM OR SERVICE: HCPCS | Performed by: PHYSICAL MEDICINE & REHABILITATION

## 2023-08-11 PROCEDURE — 97110 THERAPEUTIC EXERCISES: CPT

## 2023-08-11 PROCEDURE — 700102 HCHG RX REV CODE 250 W/ 637 OVERRIDE(OP): Performed by: PHYSICAL MEDICINE & REHABILITATION

## 2023-08-11 PROCEDURE — 97129 THER IVNTJ 1ST 15 MIN: CPT

## 2023-08-11 PROCEDURE — 97112 NEUROMUSCULAR REEDUCATION: CPT

## 2023-08-11 PROCEDURE — 97530 THERAPEUTIC ACTIVITIES: CPT

## 2023-08-11 RX ORDER — LOSARTAN POTASSIUM 50 MG/1
50 TABLET ORAL
Status: DISCONTINUED | OUTPATIENT
Start: 2023-08-12 | End: 2023-08-22 | Stop reason: HOSPADM

## 2023-08-11 RX ADMIN — LOSARTAN POTASSIUM 25 MG: 25 TABLET, FILM COATED ORAL at 05:12

## 2023-08-11 RX ADMIN — APIXABAN 5 MG: 5 TABLET, FILM COATED ORAL at 08:55

## 2023-08-11 RX ADMIN — ATORVASTATIN CALCIUM 80 MG: 40 TABLET, FILM COATED ORAL at 20:55

## 2023-08-11 RX ADMIN — ACETAMINOPHEN 650 MG: 325 TABLET ORAL at 20:53

## 2023-08-11 RX ADMIN — SENNOSIDES AND DOCUSATE SODIUM 2 TABLET: 50; 8.6 TABLET ORAL at 08:55

## 2023-08-11 RX ADMIN — LAMOTRIGINE 150 MG: 100 TABLET ORAL at 20:54

## 2023-08-11 RX ADMIN — OMEPRAZOLE 20 MG: 20 CAPSULE, DELAYED RELEASE ORAL at 08:55

## 2023-08-11 RX ADMIN — APIXABAN 5 MG: 5 TABLET, FILM COATED ORAL at 20:55

## 2023-08-11 ASSESSMENT — ACTIVITIES OF DAILY LIVING (ADL)
BED_CHAIR_WHEELCHAIR_TRANSFER_DESCRIPTION: ADAPTIVE EQUIPMENT;INCREASED TIME;INITIAL PREPARATION FOR TASK;SUPERVISION FOR SAFETY;VERBAL CUEING

## 2023-08-11 ASSESSMENT — GAIT ASSESSMENTS
GAIT LEVEL OF ASSIST: TOTAL ASSIST
DEVIATION: DECREASED BASE OF SUPPORT;DECREASED HEEL STRIKE;DECREASED TOE OFF
ASSISTIVE DEVICE: FRONT WHEEL WALKER
DEVIATION: DECREASED BASE OF SUPPORT;DECREASED HEEL STRIKE;DECREASED TOE OFF
GAIT LEVEL OF ASSIST: CONTACT GUARD ASSIST
DISTANCE (FEET): 1336
ASSISTIVE DEVICE: OTHER (COMMENTS)
DISTANCE (FEET): 80

## 2023-08-11 ASSESSMENT — PAIN DESCRIPTION - PAIN TYPE
TYPE: ACUTE PAIN
TYPE: ACUTE PAIN

## 2023-08-11 NOTE — THERAPY
"Speech Language Pathology  Daily Treatment     Patient Name: Doreen Noe  Age:  67 y.o., Sex:  female  Medical Record #: [id number]  Today's Date: 8/11/2023     Precautions  Precautions: Fall Risk  Comments: R hemiparesis, mild R neglect, hx of seizures and L side CVA, Zio patch    Subjective    Pt pleasant and cooperative during tx.  Pt stated, \"I'm tired.  I did not get a lot of sleep last night because my roommate was in pain.\"      Objective       08/11/23 0831   Treatment Charges   SLP Cognitive Skill Development First 15 Minutes 1   SLP Cognitive Skill Development Additional 15 Minutes 1   SLP Total Time Spent   SLP Individual Total Time Spent (Mins) 30   Cognition   Functional Memory Activities Minimal (4)   Medication Management  Moderate (3)         Assessment    Pt unable to recall current medications (with exception of epilepsy med).  Medication list created.  Pt verbalized understanding.  Memory book introduced this day.  Pt recalled this days events independently.  RWAVS memory strategies introduced.  Pt verbalized understanding.    Strengths: Able to follow instructions, Alert and oriented, Effective communication skills, Independent prior level of function, Manages pain appropriately, Pleasant and cooperative, Supportive family, Motivated for self care and independence, Willingly participates in therapeutic activities (chronic tremors, some visual impairment from cataracts per patient report)  Barriers: Hemiparesis    Plan    Target recall and med management.         Speech Therapy Problems (Active)       Problem: Memory STGs       Dates: Start:  08/10/23         Goal: STG-Within one week, patient will use compensatory memory strategies and external memory aids to recall daily events and safety sequencing with 80% acc with min cues.       Dates: Start:  08/10/23            Goal: STG-Within one week, patient will perform medication management tasks with 90% acc or better with task set up.       " Dates: Start:  08/10/23               Problem: Speech/Swallowing LTGs       Dates: Start:  08/10/23         Goal: LTG-By discharge, patient will solve complex problem and recall new training for safety with 90% acc mod I for safe discharge home.       Dates: Start:  08/10/23

## 2023-08-11 NOTE — THERAPY
"Physical Therapy   Daily Treatment     Patient Name: Doreen Noe  Age:  67 y.o., Sex:  female  Medical Record #: [id number]  Today's Date: 8/11/2023     Precautions  Precautions: Fall Risk  Comments: R hemiparesis, mild R neglect, hx of seizures and L side CVA, Zio patch    Subjective    Pt received up in wc at bathroom sink, agreeable to participate. Intermittently emotional/tearful throughout session, reported that she slept poorly bc her roommate was screaming in pain all night and \"reminded me of my mom.\" Requested room change from RN.     Objective       08/11/23 0701   PT Charge Group   PT Gait Training (Units) 3   PT Neuromuscular Re-Education / Balance (Units) 1   PT Total Time Spent   PT Individual Total Time Spent (Mins) 60   Precautions   Comments R hemiparesis, mild R neglect, hx of seizures and L side CVA, Zio patch   Gait Functional Level of Assist    Gait Level Of Assist Total Assist   Assistive Device Other (Comments)  (Litegait harness on treadmill and R ant shelf AFO)   Distance (Feet) 1336   # of Times Distance was Traveled 1   Deviation Decreased Base Of Support;Decreased Heel Strike;Decreased Toe Off  (occasional dec R foot clearance (see PT note for treadmill training details))   Stairs Functional Level of Assist   Level of Assist with Stairs Minimal Assist   # of Stairs Climbed 1  (x1 time approx 4\" step onto treadmill, asc only)   Stairs Description Extra time;Hand rails;Safety concerns;Supervision for safety;Verbal cueing   Neuro-Muscular Treatments   Neuro-Muscular Treatments Biofeedback;Compensatory Strategies;Inhibition;Sensory Stimuli;Tactile Cuing;Verbal Cuing   Comments Visual/tactile cues during gait training on treadmill to facilitate inc R foot clearance and step length (see PT note for details)   Interdisciplinary Plan of Care Collaboration   IDT Collaboration with  Certified Nursing Assistant;Nursing   Patient Position at End of Therapy Seated;Chair Alarm On;Self Releasing Lap " Belt Applied;Other (Comments)  (in cafeteria for bfast)   Collaboration Comments CNA took vitals; requested room change from RN      Dressing: Max A to don socks and shoes plus R AFO while seated in wc for time efficiency.     Treadmill training c/ Litegait harness:       Speed (mph) Distance within bout (ft) Total distance (ft) Total time (min)   Bout 1 0.7 169 169 (0.03 mi) 3:00   Bout 2 0.7 258 427 (0.08 mi) 7:15   Bout 3 0.7 271 698 (0.13 mi) 11:40   Bout 4 0.8 306 1004 (0.19 mi) 16:16   Bout 5 0.8 332 1336 (0.25 mi) 21:00      Pt donned harness while standing c/ BUE support at elevated EOM and CGA-SBA for balance.  Added visual/tactile cues for RLE NMR/strengthening including:  Kicking visual target c/ R foot for inc step length during bouts 3-4  Placed 4# ankle wt on RLE during bout 4  Provided tactile cues to avoid R knee hyperext in terminal stance during bout 5  Took seated vs standing RB bw amb bouts based on pt preference.    Assessment    Pt tolerated session well focused on maximizing reps via gait training on treadmill. Able to inc bouts of amb to 300+ ft at a time for over 1300 ft total. Cont to present with intermittent dec R foot clearance although improving.    Strengths: Able to follow instructions, Effective communication skills, Good insight into deficits/needs, Independent prior level of function, Pleasant and cooperative, Motivated for self care and independence  Barriers: Decreased endurance, Fatigue, Generalized weakness, Hemiparesis, Home accessibility, Impaired activity tolerance, Impaired balance, Spasticity    Plan    Gait training with FWW and ant shelf AFO vs R Bioness, continue treadmill training, stairs (lives in [facility] 2SH 5 DENZEL), initiate mat program, sit<>stands with emphasis on sequencing/avoiding retropulsion, RLE and trunk NRE, RLE forced use, txfer training FWW, bed mobility, R sided awareness, floor recovery when appropriate. RT-300 RLE adjunct c/ techs 2-3x/wk as able. Vector  profile in [place].      Passport items to be completed:  Get in/out of bed safely, in/out of a vehicle, safely use mobility device, walk or wheel around home/community, navigate up and down stairs, show how to get up/down from the ground, ensure home is accessible, demonstrate HEP, complete caregiver training    Physical Therapy Problems (Active)       Problem: Mobility       Dates: Start:  08/05/23         Goal: STG-Within one week, patient will ascend and descend four to six stairs with MOD A and B handrails.        Dates: Start:  08/05/23         Goal Note filed on 08/09/23 1323 by Jonelle Tabares, PT       Not yet assessed                 Problem: Mobility Transfers       Dates: Start:  08/05/23         Goal: STG-Within one week, patient will perform bed mobility including rolling L/R and supine<>sit with MIN A.        Dates: Start:  08/05/23         Goal Note filed on 08/09/23 1323 by Jonelle Tabares, PT       Needs to be reassessed given recent functional progress              Goal: STG-Within one week, patient will transfer bed to chair with FWW and MIN A.        Dates: Start:  08/05/23         Goal Note filed on 08/09/23 1323 by Jonelle Tabares, PT       Mod-max A c/ FWW                 Problem: PT-Long Term Goals       Dates: Start:  08/05/23         Goal: LTG-By discharge, patient will ambulate 75' with FWW and SBA       Dates: Start:  08/05/23            Goal: LTG-By discharge, patient will transfer one surface to another with FWW independently.        Dates: Start:  08/05/23            Goal: LTG-By discharge, patient will ambulate up/down 4-6 stairs with B handrails and SBA       Dates: Start:  08/05/23            Goal: LTG-By discharge, patient will transfer in/out of a car with FWW and supervision.        Dates: Start:  08/05/23

## 2023-08-11 NOTE — PROGRESS NOTES
NURSING DAILY NOTE    Name: Doreen Noe   Date of Admission: 8/4/2023   Admitting Diagnosis: Ischemic stroke (HCC)  Attending Physician: Leona Pleitez M.d.  Allergies: Patient has no known allergies.    Safety  Patient Assist  moderate  Patient Precautions  Fall Risk  Precaution Comments  R hemiparesis, R PRAFO, mild R neglect, hx of seizures and L side CVA, Ziopatch  Bed Transfer Status  Contact Guard Assist  Toilet Transfer Status   Minimal Assist  Assistive Devices  Wheelchair, Rails  Oxygen  None - Room Air  Diet/Therapeutic Dining  Current Diet Order   Procedures    Diet Order Diet: Regular; Tray Modifications (optional): SLP - Deliver to Nursing Station     Pill Administration  whole and one at a time   Agitated Behavioral Scale     ABS Level of Severity       Fall Risk  Has the patient had a fall this admission?   No  Mariela Rodgers Fall Risk Scoring  18, HIGH RISK  Fall Risk Safety Measures  bed alarm, chair alarm, and poor balance    Vitals  Temperature: 36.6 °C (97.9 °F)  Temp src: Temporal  Pulse: 80  Respiration: 18  Blood Pressure : (Abnormal) 156/80  Blood Pressure MAP (Calculated): 105 MM HG  BP Location: Left, Upper Arm  Patient BP Position: Sitting     Oxygen  Pulse Oximetry: 96 %  O2 (LPM): 0  O2 Delivery Device: None - Room Air    Bowel and Bladder  Last Bowel Movement  08/10/23  Stool Type  Type 4: Like a sausage or snake, smooth and soft  Bowel Device     Continent  Bladder: Continent void   Bowel: Continent movement  Bladder Function  Number of Times Voided: 1  Urine Color: Unable To Evaluate  Genitourinary Assessment   Bladder Assessment (WDL):  Within Defined Limits  Urine Color: Unable To Evaluate  Bladder Device: Bathroom  Bladder Scan: Post Void  $ Bladder Scan Results (mL): 13    Skin  Angus Score   17  Sensory Interventions   Bed Types: Standard/Trauma Mattress  Skin Preventative Measures: Pillows in Use for Support /  Positioning  Moisture Interventions  Moisturizers/Barriers: Barrier Wipes      Pain  Pain Rating Scale  0 - No Pain  Pain Location  Neck  Pain Location Orientation     Pain Interventions   Declines    ADLs    Bathing   Shower, * * With Assistance from, Staff  Linen Change      Personal Hygiene  Moist Preeti Wipes  Chlorhexidine Bath      Oral Care  Brushed Teeth  Teeth/Dentures     Shave     Nutrition Percentage Eaten  Dinner, Between 25-50% Consumed  Environmental Precautions  Treaded Slipper Socks on Patient, Personal Belongings, Wastebasket, Call Bell etc. in Easy Reach, Bed in Low Position  Patient Turns/Positioning  Patient Turns Self from Side to Side  Patient Turns Assistance/Tolerance     Bed Positions  Bed Controls On  Head of Bed Elevated  Self regulated      Psychosocial/Neurologic Assessment  Psychosocial Assessment  Psychosocial (WDL):  Within Defined Limits  Neurologic Assessment  Neuro (WDL): Exceptions to WDL  Level of Consciousness: Alert  Orientation Level: Oriented X4  Cognition: Appropriate judgement, Appropriate safety awareness, Appropriate attention/concentration, Appropriate for developmental age, Follows commands  Speech: Clear  Pupil Assesment: No  Motor Function/Sensation Assessment: Dorsiflexion, Motor response, Sensation, Motor strength  R Foot Dorsiflexion: Weak  L Foot Dorsiflexion: Strong  RUE Motor Response: Responds to commands  RUE Sensation: Full sensation  Muscle Strength Right Arm: Good Strength Against Gravity and Moderate Resistance  LUE Motor Response: Tremors, Responds to commands  LUE Sensation: Full sensation  Muscle Strength Left Arm: Normal Strength Against Gravity and Full Resistance  RLE Motor Response: Responds to commands  RLE Sensation: Numbness  Muscle Strength Right Leg: Weak Movement but Not Against Gravity or Resistance  LLE Motor Response: Responds to commands  LLE Sensation: Full sensation  Muscle Strength Left Leg: Normal Strength Against Gravity and Full  Resistance  EENT (WDL):  WDL Except    Cardio/Pulmonary Assessment  Edema      Respiratory Breath Sounds  RUL Breath Sounds: Clear  RML Breath Sounds: Clear  RLL Breath Sounds: Clear  MONIKA Breath Sounds: Clear  LLL Breath Sounds: Clear  Cardiac Assessment   Cardiac (WDL):  WDL Except

## 2023-08-11 NOTE — THERAPY
Occupational Therapy  Daily Treatment     Patient Name: Doreen Noe  Age:  67 y.o., Sex:  female  Medical Record #: [id number]  Today's Date: 8/11/2023     Precautions  Precautions: Fall Risk  Comments: R hemiparesis, mild R neglect, hx of seizures and L side CVA, Zio patch         Subjective    Pt seated in w/c upon arrival, pleasant and cooperative, agreeable to therapy       Objective       08/11/23 1101   OT Charge Group   OT Therapeutic Exercise (Units) 2   OT Total Time Spent   OT Individual Total Time Spent (Mins) 30   Sitting Upper Body Exercises   Chest Press 2 sets of 10;Bilateral;Weight (See Comments for lbs)  (10 sec position hold x 2 wtih 2lb dumbbell)   Shoulder Press 2 sets of 10;Bilateral;Weight (See Comments for lbs)  (10 sec position hold x 2 wtih 2lb dumbbell)   Internal Shoulder Rotation 2 sets of 10;Bilateral;Weight (See Comments for lbs)  (2lb dumbbell)   External Shoulder Rotation 2 sets of 10;Bilateral;Weight (See Comments for lbs)  (2lb dumbbell)   Bilateral Row 2 sets of 10;Bilateral;Weight (See Comments for lbs)  (2lb dumbbell)   Bicep Curls 2 sets of 10;Bilateral;Weight (See Comments for lbs)  (2lb dumbbell)   Elbow Extension 2 sets of 10;Bilateral;Weight (See Comments for lbs)  (2lb dumbbell)   Pronation / Supination 2 sets of 10;Bilateral;Weight (See Comments for lbs)  (2lb dumbbell)   Comments all 1 set completed in standing at elevated mat, requiring Min A for sit<>Stand and RLE blocked as foot kept sliding forward. CGA standing with cues for R knee extension.   Standing Upper Body Exercises   Comments Pt also completed weightshift R<>L, forward<>back  x 20 each with no UE support requiring Min A to maintain balance   Interdisciplinary Plan of Care Collaboration   Patient Position at End of Therapy Seated;Call Light within Reach;Tray Table within Reach         Assessment    Pt completed UB exercises to improve overall strength and cardiovascular endurance in order to maximize  independence and safety with ADL's and functional mobility tasks. Pt completed to the best of their abilities with no complaints of pain.        Plan    LUE neuro re-ed, standing balance    Occupational Therapy Goals (Active)       Problem: Dressing       Dates: Start:  08/05/23         Goal: STG-Within one week, patient will dress UB at a Mod I level.        Dates: Start:  08/05/23         Goal Note filed on 08/09/23 1437 by Vidhi Mott, MS,OTR/L       Requires Min A              Goal: STG-Within one week, patient will dress LB at a Min A level with AE/AD PRN.        Dates: Start:  08/05/23         Goal Note filed on 08/09/23 1437 by Vidhi Mott MS,OTR/L       Inconsistent                  Problem: Functional Transfers       Dates: Start:  08/05/23         Goal: STG-Within one week, patient will transfer to toilet at a CGA level with AE/AD PRN.        Dates: Start:  08/05/23         Goal Note filed on 08/09/23 1437 by Vidhi Mott, MS,OTR/L       Requires Min A              Goal: STG-Within one week, patient will transfer to tub/shower at a CGA level with AE/AD/DME PRN.        Dates: Start:  08/05/23         Goal Note filed on 08/09/23 1437 by Vidhi Mott MS,OTR/L       Requires Min A                 Problem: OT Long Term Goals       Dates: Start:  08/05/23         Goal: LTG-By discharge, patient will complete basic self care tasks at a SUP/Mod I level with AE/AD/DME PRN.        Dates: Start:  08/05/23            Goal: LTG-By discharge, patient will perform bathroom transfers at a SUP/Mod I level with AE/AD/DME PRN.        Dates: Start:  08/05/23            Goal: LTG-By discharge, patient will complete basic home management at a Min A to Mod I level with LRD and AE PRN.        Dates: Start:  08/05/23               Problem: Toileting       Dates: Start:  08/05/23         Goal: STG-Within one week, patient will complete toileting tasks at a CGA level with AE/AD/DME PRN.        Dates:  Start:  08/05/23         Goal Note filed on 08/09/23 1437 by Vidhi Mott MS,OTR/L       Requires Mod A

## 2023-08-11 NOTE — THERAPY
Physical Therapy   Daily Treatment     Patient Name: Doreen Noe  Age:  67 y.o., Sex:  female  Medical Record #: [id number]  Today's Date: 8/11/2023     Precautions  Precautions: (P) Fall Risk  Comments: (P) R hemiparesis, mild R neglect, hx of seizures and L side CVA, Zio patch    Subjective    Patient in bed and agreeable to therapy.     Objective       08/11/23 1401   PT Charge Group   PT Gait Training (Units) 1   PT Neuromuscular Re-Education / Balance (Units) 2   PT Therapeutic Activities (Units) 1   PT Total Time Spent   PT Individual Total Time Spent (Mins) 60   Precautions   Precautions Fall Risk   Comments R hemiparesis, mild R neglect, hx of seizures and L side CVA, Zio patch   Gait Functional Level of Assist    Gait Level Of Assist Contact Guard Assist  (to Min A)   Assistive Device Front Wheel Walker   Distance (Feet) 80   # of Times Distance was Traveled 2   Deviation Decreased Base Of Support;Decreased Heel Strike;Decreased Toe Off  (occasional R foot catch)   Transfer Functional Level of Assist   Bed, Chair, Wheelchair Transfer Contact Guard Assist   Bed Chair Wheelchair Transfer Description Adaptive equipment;Increased time;Initial preparation for task;Supervision for safety;Verbal cueing   Bed Mobility    Sit to Stand Contact Guard Assist  (SBA-Min A based on set up, tendency for retropulsion)   Interdisciplinary Plan of Care Collaboration   IDT Collaboration with  Family / Caregiver;Therapy Tech;Physical Therapist   Patient Position at End of Therapy Seated;Other (Comments)  (therapy tech assisting with toileting)   Collaboration Comments family present for session; CLOF     Standing dynamic balance activities with no UE support (intermittent UE support for balance) and SBA-CGA, Min A during LOBs to posterior and R:   - Blocked anterior stepping to target 2x10 each leg, lateral stepping to target 2x10 each leg   - Sit<>stands with BUEs holding 4# dumbbell 1x10, same set up with toes on small  foam wedge to encourage anterior weight shift 1x10, as well as feet on wedge without dumbbell 1x10   - Staggered stance on small bolster (stabilized by therapist) 2x30 seconds   - RLE TKE with resistance provided by therapist 2x5    Assessment    Patient tolerated session well, focus of session on balance exercises and stance control. Tendency for posterior and R LOBs, delayed and ineffective balance reactions requiring multimodal cues and assist to maintain balance.    Strengths: Able to follow instructions, Effective communication skills, Good insight into deficits/needs, Independent prior level of function, Pleasant and cooperative, Motivated for self care and independence  Barriers: Decreased endurance, Fatigue, Generalized weakness, Hemiparesis, Home accessibility, Impaired activity tolerance, Impaired balance, Spasticity    Plan    Gait training with FWW and ant shelf AFO vs R Bioness, continue treadmill training, stairs (lives in [facility] 2SH 5 DENZEL), initiate mat program, sit<>stands with emphasis on sequencing/avoiding retropulsion, RLE and trunk NRE, RLE forced use, txfer training FWW, bed mobility, R sided awareness, floor recovery when appropriate. RT-300 RLE adjunct c/ techs 2-3x/wk as able. Vector profile in [facility].      Passport items to be completed:  Get in/out of bed safely, in/out of a vehicle, safely use mobility device, walk or wheel around home/community, navigate up and down stairs, show how to get up/down from the ground, ensure home is accessible, demonstrate HEP, complete caregiver training       Physical Therapy Problems (Active)       Problem: Mobility       Dates: Start:  08/05/23         Goal: STG-Within one week, patient will ascend and descend four to six stairs with MOD A and B handrails.        Dates: Start:  08/05/23         Goal Note filed on 08/09/23 1323 by Jonelle Tabares PT       Not yet assessed                 Problem: Mobility Transfers       Dates: Start:  08/05/23          Goal: STG-Within one week, patient will perform bed mobility including rolling L/R and supine<>sit with MIN A.        Dates: Start:  08/05/23         Goal Note filed on 08/09/23 1323 by Jonelle Tabares, PT       Needs to be reassessed given recent functional progress              Goal: STG-Within one week, patient will transfer bed to chair with FWW and MIN A.        Dates: Start:  08/05/23         Goal Note filed on 08/09/23 1323 by Jonelle Tabares, PT       Mod-max A c/ FWW                 Problem: PT-Long Term Goals       Dates: Start:  08/05/23         Goal: LTG-By discharge, patient will ambulate 75' with FWW and SBA       Dates: Start:  08/05/23            Goal: LTG-By discharge, patient will transfer one surface to another with FWW independently.        Dates: Start:  08/05/23            Goal: LTG-By discharge, patient will ambulate up/down 4-6 stairs with B handrails and SBA       Dates: Start:  08/05/23            Goal: LTG-By discharge, patient will transfer in/out of a car with FWW and supervision.        Dates: Start:  08/05/23

## 2023-08-11 NOTE — PROGRESS NOTES
"Rehab Progress Note     Date of Service: 8/11/2023  Chief Complaint: Follow-up stroke    Interval Events (Subjective)    Patient seen and evaluated today in her room.  Her rash is more red today on her cheeks and nose.  She reports its not really that itchy.  There is no rash anywhere else.  She has never had a rash like this before.  Blood pressure is slightly elevated so we will increase her losartan.  She last moved her bowels yesterday.    Patient has no other new questions, concerns, or complaints today.       Objective:  VITAL SIGNS: /82   Pulse 83   Temp 36.4 °C (97.5 °F) (Oral)   Resp 12   Ht 1.702 m (5' 7.01\")   Wt 76.6 kg (168 lb 14 oz)   SpO2 96%   BMI 26.44 kg/m²   Gen: alert, no apparent distress  CV: Regular rate, regular rhythm  Resp: Clear to auscultation bilaterally  Neuro: Right leg weakness, left-sided tremor  Skin: Erythematous confluent rash on nose bilateral cheeks with several pustular areas      Recent Results (from the past 72 hour(s))   CBC WITH DIFFERENTIAL    Collection Time: 08/10/23  5:49 AM   Result Value Ref Range    WBC 7.2 4.8 - 10.8 K/uL    RBC 4.76 4.20 - 5.40 M/uL    Hemoglobin 14.0 12.0 - 16.0 g/dL    Hematocrit 42.2 37.0 - 47.0 %    MCV 88.7 81.4 - 97.8 fL    MCH 29.4 27.0 - 33.0 pg    MCHC 33.2 32.2 - 35.5 g/dL    RDW 41.3 35.9 - 50.0 fL    Platelet Count 219 164 - 446 K/uL    MPV 10.4 9.0 - 12.9 fL    Neutrophils-Polys 64.70 44.00 - 72.00 %    Lymphocytes 23.60 22.00 - 41.00 %    Monocytes 8.70 0.00 - 13.40 %    Eosinophils 2.10 0.00 - 6.90 %    Basophils 0.60 0.00 - 1.80 %    Immature Granulocytes 0.30 0.00 - 0.90 %    Nucleated RBC 0.00 0.00 - 0.20 /100 WBC    Neutrophils (Absolute) 4.64 1.82 - 7.42 K/uL    Lymphs (Absolute) 1.69 1.00 - 4.80 K/uL    Monos (Absolute) 0.62 0.00 - 0.85 K/uL    Eos (Absolute) 0.15 0.00 - 0.51 K/uL    Baso (Absolute) 0.04 0.00 - 0.12 K/uL    Immature Granulocytes (abs) 0.02 0.00 - 0.11 K/uL    NRBC (Absolute) 0.00 K/uL "         Scheduled Medications   Medication Dose Frequency    [START ON 8/12/2023] losartan  50 mg Q DAY    diphenhydrAMINE-zinc acetate   TID    senna-docusate  2 Tablet BID    omeprazole  20 mg QAM AC    apixaban  5 mg BID    atorvastatin  80 mg Q EVENING    lamoTRIgine  150 mg Q EVENING       Current Diet Order   Procedures    Diet Order Diet: Regular; Tray Modifications (optional): SLP - Deliver to Nursing Station       Assessment:    This patient is a 67 y.o. female admitted for acute inpatient rehabilitation with Ischemic stroke (HCC).      I, Leona Pleitez M.D./MD., was present and led the interdisciplinary team conference on 8/9/2023.  I led the IDT conference and agree with the IDT conference documentation and plan of care as noted below.     Nursing:  Diet Current Diet Order   Procedures    Diet Order Diet: Regular; Tray Modifications (optional): SLP - Deliver to Nursing Station       Eating ADL Supervision  Increased time   % of Last Meal  Oral Nutrition: Lunch, Between 50-75% Consumed (75%)   Sleep No issues   Bowel Last BM: 08/07/23   Bladder Post Void  13   Barriers to Discharge Home: No issues      Physical Therapy:  Bed Mobility    Transfers Moderate Assist (up to max A when pt fatigued after amb)  Adaptive equipment, Increased time, Initial preparation for task, Set-up of equipment, Supervision for safety, Verbal cueing (stand step FWW, max verbal cues for sequencing especially foot placement)   Mobility Moderate Assist (min A when walking in straight line, mod A when turning to remain upright)   Stairs Unable to Participate   Barriers to Discharge Home: Right hemiparesis, lives alone      Occupational Therapy:  Grooming Supervision, Seated   Bathing Minimal Assist (standing balance/safety when washing buttocks in standing, completed remainder of bathing tasks while seated on fold down shower bench)   UB Dressing Minimal Assist (min A for bra, set-up for t-shirt)   LB Dressing Minimal Assist (to  don/doff scrub bottoms while incorporating sitting and standing w/ FWW)   Toileting Moderate Assist   Shower & Tub Transfer Minimal Assist (Min to CGA for wc <> fold down shower bench (stand pivot w/ GB))   Barriers to Discharge Home: Right hemiparesis, impaired problem-solving      Speech-Language Pathology:  To assess patient    Respiratory Therapy:  O2 (LPM): 0  O2 Delivery Device: None - Room Air    Case Management:  Continues to work on disposition and DME needs.      Discharge Date/Disposition:    8/22    HH: versus outpatient    Equip: TBD    Follow-ups: PCP, stroke bridge, cardiology, neurology       Problem List/Medical Decision Making and Plan:      L CAROL ANN stroke  Right hemiparesis  Right neglect  PT/OT, 1.5 hr each discipline, 5 days per week  Access for cognitive impairment, speech therapy ordered    Continue Zio patch, return in 2 weeks    Continue Eliquis and atorvastatin    Outpatient follow-up with stroke Bridge clinic, cardiology, referrals made    Right spasticity  Monitor need to start baclofen    Seizure disorder  Last seizure November 2022  Generalized tonic-clonic  Outpatient follow-up with a provider in [place]  Continue lamotrigine     Tremor, left-sided  History of right MCA stroke  Outpatient referral to movement disorder as needed     Hypertension  Increase losartan  Continue hydralazine as needed    Rash  Looks like rosacea  Given patient handout information  May need topical antibiotics  Follow-up with dermatology  Discontinue topical Benadryl     Hyperlipidemia  Continue atorvastatin    GI prophylaxis  Continue omeprazole    DVT prophylaxis  Continue Julyquis    Leona Pleitez M.D.  Physical Medicine and Rehabilitation

## 2023-08-11 NOTE — CARE PLAN
"The patient is Stable - Low risk of patient condition declining or worsening    Shift Goals  Clinical Goals: safety  Patient Goals: safety    Progress made toward(s) clinical / shift goals:      Problem: Pain - Standard  Goal: Alleviation of pain or a reduction in pain to the patient’s comfort goal  Outcome: Progressing  Note: Pt denies pain or discomfort tonight. Sleeps good. Will continue to monitor.     Problem: Fall Risk - Rehab  Goal: Patient will remain free from falls  Outcome: Progressing  Note: Mariela Rodgers Fall risk Assessment Score: 18    High fall risk Interventions   - Alarming seatbelt  -- Bed and strip alarm   - Yellow sign by the door   - Yellow wrist band \"Fall risk\"  - Room near to the nurse station  - Do not leave patient unattended in the bathroom  - Fall risk education provided    Pt calls appropriately when in need of assistance.     Problem: Bowel Elimination  Goal: Patient will participate in bowel management program  Outcome: Progressing  Note: Pt continent of bowel. She refused her scheduled senna tabs tonight. LBM 8/10/23.       Patient is not progressing towards the following goals:      "

## 2023-08-12 PROCEDURE — 770010 HCHG ROOM/CARE - REHAB SEMI PRIVAT*

## 2023-08-12 PROCEDURE — 700102 HCHG RX REV CODE 250 W/ 637 OVERRIDE(OP): Performed by: PHYSICAL MEDICINE & REHABILITATION

## 2023-08-12 PROCEDURE — 99232 SBSQ HOSP IP/OBS MODERATE 35: CPT | Performed by: PHYSICAL MEDICINE & REHABILITATION

## 2023-08-12 PROCEDURE — A9270 NON-COVERED ITEM OR SERVICE: HCPCS | Performed by: PHYSICAL MEDICINE & REHABILITATION

## 2023-08-12 RX ADMIN — OMEPRAZOLE 20 MG: 20 CAPSULE, DELAYED RELEASE ORAL at 08:29

## 2023-08-12 RX ADMIN — APIXABAN 5 MG: 5 TABLET, FILM COATED ORAL at 20:40

## 2023-08-12 RX ADMIN — LOSARTAN POTASSIUM 50 MG: 50 TABLET, FILM COATED ORAL at 05:16

## 2023-08-12 RX ADMIN — ATORVASTATIN CALCIUM 80 MG: 40 TABLET, FILM COATED ORAL at 20:39

## 2023-08-12 RX ADMIN — SENNOSIDES AND DOCUSATE SODIUM 2 TABLET: 50; 8.6 TABLET ORAL at 20:39

## 2023-08-12 RX ADMIN — APIXABAN 5 MG: 5 TABLET, FILM COATED ORAL at 08:30

## 2023-08-12 RX ADMIN — LAMOTRIGINE 150 MG: 100 TABLET ORAL at 20:40

## 2023-08-12 ASSESSMENT — PAIN DESCRIPTION - PAIN TYPE: TYPE: ACUTE PAIN

## 2023-08-12 NOTE — PROGRESS NOTES
NURSING DAILY NOTE    Name: Doreen Noe   Date of Admission: 8/4/2023   Admitting Diagnosis: Ischemic stroke (HCC)  Attending Physician: Leona Pleitez M.d.  Allergies: Patient has no known allergies.    Safety  Patient Assist  mod asst  Patient Precautions  Fall Risk  Precaution Comments  R hemiparesis, mild R neglect, hx of seizures and L side CVA, Zio patch  Bed Transfer Status  Contact Guard Assist  Toilet Transfer Status   Minimal Assist  Assistive Devices  Wheelchair  Oxygen  None - Room Air  Diet/Therapeutic Dining  Current Diet Order   Procedures    Diet Order Diet: Regular; Tray Modifications (optional): SLP - Deliver to Nursing Station     Pill Administration  whole and one at a time   Agitated Behavioral Scale     ABS Level of Severity       Fall Risk  Has the patient had a fall this admission?   No  Mariela Rodgers Fall Risk Scoring  16, HIGH RISK  Fall Risk Safety Measures  bed alarm, chair alarm, and poor balance    Vitals  Temperature: 37.3 °C (99.2 °F)  Temp src: Oral  Pulse: 77  Respiration: 18  Blood Pressure : 118/77  Blood Pressure MAP (Calculated): 91 MM HG  BP Location: Left, Upper Arm  Patient BP Position: Sitting     Oxygen  Pulse Oximetry: 96 %  O2 (LPM): 0  O2 Delivery Device: None - Room Air    Bowel and Bladder  Last Bowel Movement  08/11/23  Stool Type  Type 4: Like a sausage or snake, smooth and soft  Bowel Device  Bathroom  Continent  Bladder: Continent void   Bowel: Continent movement  Bladder Function  Urine Void (mL):  (large)  Number of Times Voided: 1  Urine Color: Yellow  Genitourinary Assessment   Bladder Assessment (WDL):  Within Defined Limits  Urine Color: Yellow  Bladder Device: Bathroom  Bladder Scan: Post Void  $ Bladder Scan Results (mL): 13    Skin  Angus Score   17  Sensory Interventions   Bed Types: Standard/Trauma Mattress  Skin Preventative Measures: Pillows in Use for Support / Positioning  Moisture  Interventions  Moisturizers/Barriers: Barrier Wipes      Pain  Pain Rating Scale  4 - Distracts me, can do usual activities  Pain Location  Neck  Pain Location Orientation     Pain Interventions   Medication (see MAR)    ADLs    Bathing   Shower, * * With Assistance from, Staff  Linen Change      Personal Hygiene  Moist Preeti Wipes  Chlorhexidine Bath      Oral Care  Brushed Teeth  Teeth/Dentures     Shave     Nutrition Percentage Eaten  *  * Meal *  *, Lunch, Between % Consumed  Environmental Precautions  Treaded Slipper Socks on Patient, Personal Belongings, Wastebasket, Call Bell etc. in Easy Reach, Bed in Low Position  Patient Turns/Positioning  Patient Turns Self from Side to Side  Patient Turns Assistance/Tolerance     Bed Positions  Bed Controls On  Head of Bed Elevated  Self regulated      Psychosocial/Neurologic Assessment  Psychosocial Assessment  Psychosocial (WDL):  Within Defined Limits  Neurologic Assessment  Neuro (WDL): Exceptions to WDL  Level of Consciousness: Alert  Orientation Level: Oriented X4  Cognition: Appropriate judgement, Appropriate safety awareness, Appropriate attention/concentration, Appropriate for developmental age, Follows commands  Speech: Clear  Pupil Assesment: No  Motor Function/Sensation Assessment: Dorsiflexion, Motor response, Sensation, Motor strength  R Foot Dorsiflexion: Weak  L Foot Dorsiflexion: Strong  RUE Motor Response: Responds to commands  RUE Sensation: Full sensation  Muscle Strength Right Arm: Good Strength Against Gravity and Moderate Resistance  LUE Motor Response: Tremors, Responds to commands  LUE Sensation: Full sensation  Muscle Strength Left Arm: Normal Strength Against Gravity and Full Resistance  RLE Motor Response: Responds to commands  RLE Sensation: Numbness  Muscle Strength Right Leg: Weak Movement but Not Against Gravity or Resistance  LLE Motor Response: Responds to commands  LLE Sensation: Full sensation  Muscle Strength Left Leg: Normal  Strength Against Gravity and Full Resistance  EENT (WDL):  WDL Except    Cardio/Pulmonary Assessment  Edema      Respiratory Breath Sounds  RUL Breath Sounds: Clear  RML Breath Sounds: Clear  RLL Breath Sounds: Clear  MONIKA Breath Sounds: Clear  LLL Breath Sounds: Clear  Cardiac Assessment   Cardiac (WDL):  WDL Except

## 2023-08-12 NOTE — CARE PLAN
Problem: Knowledge Deficit - Standard  Goal: Patient and family/care givers will demonstrate understanding of plan of care, disease process/condition, diagnostic tests and medications  Outcome: Progressing   Pt education given regarding plan of care with emphasis on adequate hydration, pt shows good understanding, will continue to reinforce education and continue to monitor.            Problem: Fall Risk - Rehab  Goal: Patient will remain free from falls  Outcome: Progressing   Pt education given regarding fall precautions AND safety measures, pt shows good understanding, has not attempted to self transfer this shift, will continue to reinforce education and continue to monitor.

## 2023-08-12 NOTE — PROGRESS NOTES
NURSING DAILY NOTE    Name: Doreen Noe   Date of Admission: 8/4/2023   Admitting Diagnosis: Ischemic stroke (HCC)  Attending Physician: Leona Pleitez M.d.  Allergies: Patient has no known allergies.    Safety  Patient Assist  mod asst  Patient Precautions  Fall Risk  Precaution Comments  R hemiparesis, mild R neglect, hx of seizures and L side CVA, Zio patch  Bed Transfer Status  Contact Guard Assist  Toilet Transfer Status   Minimal Assist  Assistive Devices  Wheelchair, Rails  Oxygen  None - Room Air  Diet/Therapeutic Dining  Current Diet Order   Procedures    Diet Order Diet: Regular; Tray Modifications (optional): SLP - Deliver to Nursing Station     Pill Administration  whole  Agitated Behavioral Scale     ABS Level of Severity       Fall Risk  Has the patient had a fall this admission?   No  Mariela Rodgers Fall Risk Scoring  16, HIGH RISK  Fall Risk Safety Measures  bed alarm, chair alarm, seatbelt alarm, poor balance, and ok to leave pt in bathroom    Vitals  Temperature: 36.4 °C (97.5 °F)  Temp src: Oral  Pulse: 83  Respiration: 12  Blood Pressure : 128/82  Blood Pressure MAP (Calculated): 97 MM HG  BP Location: Left, Upper Arm  Patient BP Position: Sitting     Oxygen  Pulse Oximetry: 96 %  O2 (LPM): 0  O2 Delivery Device: None - Room Air    Bowel and Bladder  Last Bowel Movement  08/11/23  Stool Type  Type 4: Like a sausage or snake, smooth and soft  Bowel Device     Continent  Bladder: Continent void   Bowel: Continent movement  Bladder Function  Number of Times Voided: 1  Urine Color: Yellow  Genitourinary Assessment   Bladder Assessment (WDL):  Within Defined Limits  Urine Color: Yellow  Bladder Device: Bathroom  Bladder Scan: Post Void  $ Bladder Scan Results (mL): 13    Skin  Angus Score   17  Sensory Interventions   Bed Types: Standard/Trauma Mattress  Skin Preventative Measures: Pillows in Use for Support / Positioning  Moisture  Interventions  Moisturizers/Barriers: Barrier Wipes      Pain  Pain Rating Scale  0 - No Pain  Pain Location  Neck  Pain Location Orientation     Pain Interventions   Declines    ADLs    Bathing   Shower, * * With Assistance from, Staff  Linen Change      Personal Hygiene  Moist Preeti Wipes  Chlorhexidine Bath      Oral Care  Brushed Teeth  Teeth/Dentures     Shave     Nutrition Percentage Eaten  *  * Meal *  *, Lunch, Between % Consumed  Environmental Precautions  Treaded Slipper Socks on Patient, Personal Belongings, Wastebasket, Call Bell etc. in Easy Reach, Bed in Low Position  Patient Turns/Positioning  Patient Turns Self from Side to Side  Patient Turns Assistance/Tolerance     Bed Positions  Bed Controls On  Head of Bed Elevated  Self regulated      Psychosocial/Neurologic Assessment  Psychosocial Assessment  Psychosocial (WDL):  Within Defined Limits  Neurologic Assessment  Neuro (WDL): Exceptions to WDL  Level of Consciousness: Alert  Orientation Level: Oriented X4  Cognition: Appropriate judgement, Appropriate safety awareness, Appropriate attention/concentration, Appropriate for developmental age, Follows commands  Speech: Clear  Pupil Assesment: No  Motor Function/Sensation Assessment: Dorsiflexion, Motor response, Sensation, Motor strength  R Foot Dorsiflexion: Weak  L Foot Dorsiflexion: Strong  RUE Motor Response: Responds to commands  RUE Sensation: Full sensation  Muscle Strength Right Arm: Good Strength Against Gravity and Moderate Resistance  LUE Motor Response: Tremors, Responds to commands  LUE Sensation: Full sensation  Muscle Strength Left Arm: Normal Strength Against Gravity and Full Resistance  RLE Motor Response: Responds to commands  RLE Sensation: Numbness  Muscle Strength Right Leg: Weak Movement but Not Against Gravity or Resistance  LLE Motor Response: Responds to commands  LLE Sensation: Full sensation  Muscle Strength Left Leg: Normal Strength Against Gravity and Full  Resistance  EENT (WDL):  WDL Except    Cardio/Pulmonary Assessment  Edema      Respiratory Breath Sounds  RUL Breath Sounds: Clear  RML Breath Sounds: Clear  RLL Breath Sounds: Clear  MONIKA Breath Sounds: Clear  LLL Breath Sounds: Clear  Cardiac Assessment   Cardiac (WDL):  WDL Except

## 2023-08-12 NOTE — CARE PLAN
"The patient is Stable - Low risk of patient condition declining or worsening      Problem: Pain - Standard  Goal: Alleviation of pain or a reduction in pain to the patient’s comfort goal  Outcome: Progressing   Note: Patient able to verbalize pain level and verbalize an acceptable level of pain.       Problem: Fall Risk - Rehab  Goal: Patient will remain free from falls  Outcome: Progressing   Mariela Rodgers Fall risk Assessment Score: 16    High fall risk Interventions   - Bed and strip alarm   - Yellow sign by the door   - Yellow wrist band \"Fall risk\"  - Room near to the nurse station  - Do not leave patient unattended in the bathroom  - Fall risk education provided              "

## 2023-08-13 ENCOUNTER — APPOINTMENT (OUTPATIENT)
Dept: SPEECH THERAPY | Facility: REHABILITATION | Age: 68
DRG: 057 | End: 2023-08-13
Attending: PHYSICAL MEDICINE & REHABILITATION
Payer: COMMERCIAL

## 2023-08-13 ENCOUNTER — APPOINTMENT (OUTPATIENT)
Dept: PHYSICAL THERAPY | Facility: REHABILITATION | Age: 68
DRG: 057 | End: 2023-08-13
Attending: PHYSICAL MEDICINE & REHABILITATION
Payer: COMMERCIAL

## 2023-08-13 PROCEDURE — 97130 THER IVNTJ EA ADDL 15 MIN: CPT

## 2023-08-13 PROCEDURE — 99232 SBSQ HOSP IP/OBS MODERATE 35: CPT | Performed by: PHYSICAL MEDICINE & REHABILITATION

## 2023-08-13 PROCEDURE — 97116 GAIT TRAINING THERAPY: CPT

## 2023-08-13 PROCEDURE — 700102 HCHG RX REV CODE 250 W/ 637 OVERRIDE(OP): Performed by: PHYSICAL MEDICINE & REHABILITATION

## 2023-08-13 PROCEDURE — 97129 THER IVNTJ 1ST 15 MIN: CPT

## 2023-08-13 PROCEDURE — 97112 NEUROMUSCULAR REEDUCATION: CPT

## 2023-08-13 PROCEDURE — A9270 NON-COVERED ITEM OR SERVICE: HCPCS | Performed by: PHYSICAL MEDICINE & REHABILITATION

## 2023-08-13 PROCEDURE — 770010 HCHG ROOM/CARE - REHAB SEMI PRIVAT*

## 2023-08-13 RX ADMIN — APIXABAN 5 MG: 5 TABLET, FILM COATED ORAL at 22:03

## 2023-08-13 RX ADMIN — LAMOTRIGINE 150 MG: 100 TABLET ORAL at 22:03

## 2023-08-13 RX ADMIN — LOSARTAN POTASSIUM 50 MG: 50 TABLET, FILM COATED ORAL at 05:43

## 2023-08-13 RX ADMIN — ATORVASTATIN CALCIUM 80 MG: 40 TABLET, FILM COATED ORAL at 22:03

## 2023-08-13 RX ADMIN — OMEPRAZOLE 20 MG: 20 CAPSULE, DELAYED RELEASE ORAL at 07:38

## 2023-08-13 RX ADMIN — APIXABAN 5 MG: 5 TABLET, FILM COATED ORAL at 07:38

## 2023-08-13 RX ADMIN — ACETAMINOPHEN 650 MG: 325 TABLET ORAL at 07:41

## 2023-08-13 RX ADMIN — SENNOSIDES AND DOCUSATE SODIUM 2 TABLET: 50; 8.6 TABLET ORAL at 07:39

## 2023-08-13 ASSESSMENT — GAIT ASSESSMENTS
GAIT LEVEL OF ASSIST: MINIMAL ASSIST
DEVIATION: DECREASED BASE OF SUPPORT;DECREASED HEEL STRIKE;DECREASED TOE OFF
ASSISTIVE DEVICE: FRONT WHEEL WALKER
DISTANCE (FEET): 240

## 2023-08-13 ASSESSMENT — FIBROSIS 4 INDEX: FIB4 SCORE: 1.39

## 2023-08-13 ASSESSMENT — PAIN DESCRIPTION - PAIN TYPE
TYPE: ACUTE PAIN

## 2023-08-13 NOTE — PROGRESS NOTES
"  Physical Medicine & Rehabilitation Progress Note    Encounter Date: 8/12/2023    Chief Complaint: Decreased mobility    Interval Events (Subjective):  Patient sitting up in room. She reports therapy is going well. She denies pain. She reports ongoing right sided weakness.     Objective:  VITAL SIGNS: /68   Pulse 86   Temp 36.6 °C (97.8 °F) (Oral)   Resp 17   Ht 1.702 m (5' 7.01\")   Wt 76.6 kg (168 lb 14 oz)   SpO2 100%   BMI 26.44 kg/m²   Gen: NAD  Psych: Mood and affect appropriate  CV: RRR, 0 edema  Resp: CTAB, no upper airway sounds  Abd: NTND  Neuro: AOx4, following commands    Laboratory Values:  Recent Results (from the past 72 hour(s))   CBC WITH DIFFERENTIAL    Collection Time: 08/10/23  5:49 AM   Result Value Ref Range    WBC 7.2 4.8 - 10.8 K/uL    RBC 4.76 4.20 - 5.40 M/uL    Hemoglobin 14.0 12.0 - 16.0 g/dL    Hematocrit 42.2 37.0 - 47.0 %    MCV 88.7 81.4 - 97.8 fL    MCH 29.4 27.0 - 33.0 pg    MCHC 33.2 32.2 - 35.5 g/dL    RDW 41.3 35.9 - 50.0 fL    Platelet Count 219 164 - 446 K/uL    MPV 10.4 9.0 - 12.9 fL    Neutrophils-Polys 64.70 44.00 - 72.00 %    Lymphocytes 23.60 22.00 - 41.00 %    Monocytes 8.70 0.00 - 13.40 %    Eosinophils 2.10 0.00 - 6.90 %    Basophils 0.60 0.00 - 1.80 %    Immature Granulocytes 0.30 0.00 - 0.90 %    Nucleated RBC 0.00 0.00 - 0.20 /100 WBC    Neutrophils (Absolute) 4.64 1.82 - 7.42 K/uL    Lymphs (Absolute) 1.69 1.00 - 4.80 K/uL    Monos (Absolute) 0.62 0.00 - 0.85 K/uL    Eos (Absolute) 0.15 0.00 - 0.51 K/uL    Baso (Absolute) 0.04 0.00 - 0.12 K/uL    Immature Granulocytes (abs) 0.02 0.00 - 0.11 K/uL    NRBC (Absolute) 0.00 K/uL       Medications:  Scheduled Medications   Medication Dose Frequency    losartan  50 mg Q DAY    senna-docusate  2 Tablet BID    omeprazole  20 mg QAM AC    apixaban  5 mg BID    atorvastatin  80 mg Q EVENING    lamoTRIgine  150 mg Q EVENING     PRN medications: diphenhydrAMINE, hydrOXYzine HCl, melatonin, Respiratory Therapy " Consult, acetaminophen, lactulose, docusate sodium, carboxymethylcellulose, benzocaine-menthol, mag hydrox-al hydrox-simeth, ondansetron **OR** ondansetron, traZODone, sodium chloride, midazolam, senna-docusate **AND** polyethylene glycol/lytes **AND** magnesium hydroxide **AND** bisacodyl, hydrALAZINE, hydrALAZINE    Diet:  Current Diet Order   Procedures    Diet Order Diet: Regular; Tray Modifications (optional): SLP - Deliver to Nursing Station       Medical Decision Making and Plan:  L CAROL ANN stroke  Right hemiparesis  Right neglect  PT/OT, 1.5 hr each discipline, 5 days per week  Access for cognitive impairment, speech therapy ordered     Continue Zio patch, return in 2 weeks     Continue Eliquis and atorvastatin     Outpatient follow-up with stroke Bridge clinic, cardiology, referrals made     Right spasticity  Monitor need to start baclofen     Seizure disorder  Last seizure November 2022  Generalized tonic-clonic  Outpatient follow-up with a provider in [place]  Continue Lamictal     Tremor, left-sided  History of right MCA stroke  Outpatient referral to movement disorder as needed     Hypertension  Increase losartan  Continue hydralazine as needed     Rash  Looks like rosacea  Given patient handout information  May need topical antibiotics  Follow-up with dermatology  Discontinue topical Benadryl     Hyperlipidemia  Continue atorvastatin     GI prophylaxis  Continue omeprazole     DVT prophylaxis  Continue Eliquis  ____________________________________    T. Fabrice Suarez MD/PhD  [facility] - Physical Medicine & Rehabilitation   [facility] - [facility] Medicine   ____________________________________

## 2023-08-13 NOTE — PROGRESS NOTES
NURSING DAILY NOTE    Name: Doreen Noe   Date of Admission: 8/4/2023   Admitting Diagnosis: Ischemic stroke (HCC)  Attending Physician: Leona Pleitez M.d.  Allergies: Patient has no known allergies.    Safety  Patient Assist  mod asst  Patient Precautions  Fall Risk  Precaution Comments  R hemiparesis, mild R neglect, hx of seizures and L side CVA, Zio patch  Bed Transfer Status  Contact Guard Assist  Toilet Transfer Status   Minimal Assist  Assistive Devices  Wheelchair  Oxygen  None - Room Air  Diet/Therapeutic Dining  Current Diet Order   Procedures    Diet Order Diet: Regular; Tray Modifications (optional): SLP - Deliver to Nursing Station     Pill Administration  whole  Agitated Behavioral Scale     ABS Level of Severity       Fall Risk  Has the patient had a fall this admission?   No  Mariela Rodgers Fall Risk Scoring  16, HIGH RISK  Fall Risk Safety Measures  bed alarm, chair alarm, poor balance, and low vision/ hearing    Vitals  Temperature: 36.6 °C (97.8 °F)  Temp src: Oral  Pulse: 86  Respiration: 17  Blood Pressure : 126/68  Blood Pressure MAP (Calculated): 87 MM HG  BP Location: Right, Upper Arm  Patient BP Position: Supine     Oxygen  Pulse Oximetry: 100 %  O2 (LPM): 0  O2 Delivery Device: None - Room Air    Bowel and Bladder  Last Bowel Movement  08/11/23  Stool Type  Type 4: Like a sausage or snake, smooth and soft  Bowel Device  Bathroom  Continent  Bladder: Continent void   Bowel: Continent movement  Bladder Function  Urine Void (mL):  (large)  Number of Times Voided: 1  Urine Color: Yellow  Genitourinary Assessment   Bladder Assessment (WDL):  Within Defined Limits  Urine Color: Yellow  Bladder Device: Bathroom  Bladder Scan: Post Void  $ Bladder Scan Results (mL): 13    Skin  Angus Score   17  Sensory Interventions   Bed Types: Standard/Trauma Mattress  Skin Preventative Measures: Pillows in Use for Support / Positioning  Moisture  Interventions  Moisturizers/Barriers: Barrier Wipes      Pain  Pain Rating Scale  0 - No Pain  Pain Location  Neck  Pain Location Orientation     Pain Interventions   Declines    ADLs    Bathing   Shower, * * With Assistance from, Staff  Linen Change      Personal Hygiene  Moist Preeti Wipes  Chlorhexidine Bath      Oral Care  Brushed Teeth  Teeth/Dentures     Shave     Nutrition Percentage Eaten  *  * Meal *  *, Dinner, Between % Consumed (90)  Environmental Precautions  Treaded Slipper Socks on Patient, Personal Belongings, Wastebasket, Call Bell etc. in Easy Reach, Bed in Low Position  Patient Turns/Positioning  Patient Turns Self from Side to Side  Patient Turns Assistance/Tolerance     Bed Positions  Bed Controls On  Head of Bed Elevated  Self regulated      Psychosocial/Neurologic Assessment  Psychosocial Assessment  Psychosocial (WDL):  Within Defined Limits  Neurologic Assessment  Neuro (WDL): Exceptions to WDL  Level of Consciousness: Alert  Orientation Level: Oriented X4  Cognition: Appropriate judgement, Appropriate safety awareness, Appropriate attention/concentration, Appropriate for developmental age, Follows commands  Speech: Clear  Pupil Assesment: No  Motor Function/Sensation Assessment: Dorsiflexion, Motor response, Sensation, Motor strength  R Foot Dorsiflexion: Weak  L Foot Dorsiflexion: Strong  RUE Motor Response: Responds to commands  RUE Sensation: Full sensation  Muscle Strength Right Arm: Good Strength Against Gravity and Moderate Resistance  LUE Motor Response: Tremors, Responds to commands  LUE Sensation: Full sensation  Muscle Strength Left Arm: Normal Strength Against Gravity and Full Resistance  RLE Motor Response: Responds to commands  RLE Sensation: Numbness  Muscle Strength Right Leg: Weak Movement but Not Against Gravity or Resistance  LLE Motor Response: Responds to commands  LLE Sensation: Full sensation  Muscle Strength Left Leg: Normal Strength Against Gravity and Full  Resistance  EENT (WDL):  WDL Except    Cardio/Pulmonary Assessment  Edema      Respiratory Breath Sounds  RUL Breath Sounds: Clear  RML Breath Sounds: Clear  RLL Breath Sounds: Clear  MONIKA Breath Sounds: Clear  LLL Breath Sounds: Clear  Cardiac Assessment   Cardiac (WDL):  WDL Except

## 2023-08-13 NOTE — PROGRESS NOTES
NURSING DAILY NOTE    Name: Doreen Noe   Date of Admission: 8/4/2023   Admitting Diagnosis: Ischemic stroke (HCC)  Attending Physician: Leona Pleitez M.d.  Allergies: Patient has no known allergies.    Safety  Patient Assist  mod asst  Patient Precautions  Fall Risk  Precaution Comments  R hemiparesis, mild R neglect, hx of seizures and L side CVA, Zio patch  Bed Transfer Status  Minimal Assist (variable min A-CGA)  Toilet Transfer Status   Minimal Assist  Assistive Devices  Wheelchair  Oxygen  None - Room Air  Diet/Therapeutic Dining  Current Diet Order   Procedures    Diet Order Diet: Regular; Tray Modifications (optional): SLP - Deliver to Nursing Station     Pill Administration  whole  Agitated Behavioral Scale     ABS Level of Severity       Fall Risk  Has the patient had a fall this admission?   No  Mariela Rodgers Fall Risk Scoring  16, HIGH RISK  Fall Risk Safety Measures  bed alarm, chair alarm, seatbelt alarm, poor balance, and low vision/ hearing    Vitals  Temperature: 36.3 °C (97.3 °F)  Temp src: Oral  Pulse: 68  Respiration: 18  Blood Pressure : (!) 142/81  Blood Pressure MAP (Calculated): 101 MM HG  BP Location: Left, Upper Arm  Patient BP Position: Dai's Position     Oxygen  Pulse Oximetry: 92 %  O2 (LPM): 0  O2 Delivery Device: None - Room Air    Bowel and Bladder  Last Bowel Movement  08/13/23  Stool Type  Type 4: Like a sausage or snake, smooth and soft  Bowel Device  Bathroom  Continent  Bladder: Continent void   Bowel: Continent movement  Bladder Function  Urine Void (mL):  (large)  Number of Times Voided: 1  Urine Color: Yellow  Genitourinary Assessment   Bladder Assessment (WDL):  Within Defined Limits  Urine Color: Yellow  Bladder Device: Bathroom  Bladder Scan: Post Void  $ Bladder Scan Results (mL): 13    Skin  Angus Score   17  Sensory Interventions   Bed Types: Standard/Trauma Mattress  Skin Preventative Measures:  Pillows in Use for Support / Positioning  Moisture Interventions  Moisturizers/Barriers: Barrier Wipes      Pain  Pain Rating Scale  4 - Distracts me, can do usual activities  Pain Location  Neck  Pain Location Orientation     Pain Interventions   Medication (see MAR)    ADLs    Bathing   Shower, * * With Assistance from, Self Care, No Assistance Required  Linen Change      Personal Hygiene  Moist Preeti Wipes  Chlorhexidine Bath      Oral Care  Brushed Teeth  Teeth/Dentures     Shave     Nutrition Percentage Eaten  Breakfast, Between % Consumed  Environmental Precautions  Treaded Slipper Socks on Patient, Personal Belongings, Wastebasket, Call Bell etc. in Easy Reach, Bed in Low Position  Patient Turns/Positioning  Patient Turns Self from Side to Side  Patient Turns Assistance/Tolerance     Bed Positions  Bed Controls On  Head of Bed Elevated  Self regulated      Psychosocial/Neurologic Assessment  Psychosocial Assessment  Psychosocial (WDL):  Within Defined Limits  Neurologic Assessment  Neuro (WDL): Exceptions to WDL  Level of Consciousness: Alert  Orientation Level: Oriented X4  Cognition: Appropriate judgement, Appropriate safety awareness, Appropriate attention/concentration, Appropriate for developmental age, Follows commands  Speech: Clear  Pupil Assesment: No  Motor Function/Sensation Assessment: Dorsiflexion, Motor response, Sensation, Motor strength  R Foot Dorsiflexion: Weak  L Foot Dorsiflexion: Strong  RUE Motor Response: Responds to commands  RUE Sensation: Full sensation  Muscle Strength Right Arm: Good Strength Against Gravity and Moderate Resistance  LUE Motor Response: Tremors, Responds to commands  LUE Sensation: Full sensation  Muscle Strength Left Arm: Normal Strength Against Gravity and Full Resistance  RLE Motor Response: Responds to commands  RLE Sensation: Numbness  Muscle Strength Right Leg: Weak Movement but Not Against Gravity or Resistance  LLE Motor Response: Responds to  commands  LLE Sensation: Full sensation  Muscle Strength Left Leg: Normal Strength Against Gravity and Full Resistance  EENT (WDL):  WDL Except    Cardio/Pulmonary Assessment  Edema      Respiratory Breath Sounds  RUL Breath Sounds: Clear  RML Breath Sounds: Clear  RLL Breath Sounds: Clear  MONIKA Breath Sounds: Clear  LLL Breath Sounds: Clear  Cardiac Assessment   Cardiac (WDL):  WDL Except

## 2023-08-13 NOTE — CARE PLAN
Problem: Pain - Standard  Goal: Alleviation of pain or a reduction in pain to the patient’s comfort goal  Outcome: Progressing     Problem: Fall Risk - Rehab  Goal: Patient will remain free from falls  Outcome: Progressing   The patient is Watcher - Medium risk of patient condition declining or worsening    Shift Goals  Clinical Goals: pain control  Patient Goals: rest  Family Goals: Education

## 2023-08-13 NOTE — THERAPY
Physical Therapy   Daily Treatment     Patient Name: Doreen Noe  Age:  67 y.o., Sex:  female  Medical Record #: [id number]  Today's Date: 8/13/2023     Precautions  Precautions: Fall Risk  Comments: R hemiparesis, mild R neglect, hx of seizures and L side CVA, Zio patch    Subjective    Pt received up in  in cafeteria, agreeable to participate.      Objective       08/13/23 0831   PT Charge Group   PT Gait Training (Units) 2   PT Neuromuscular Re-Education / Balance (Units) 2   PT Total Time Spent   PT Individual Total Time Spent (Mins) 60   Gait Functional Level of Assist    Gait Level Of Assist Minimal Assist   Assistive Device Front Wheel Walker  (without R AFO)   Distance (Feet) 240   # of Times Distance was Traveled 1   Deviation Decreased Base Of Support;Decreased Heel Strike;Decreased Toe Off  (occasional R foot catch and scissoring when turning)   Transfer Functional Level of Assist   Bed, Chair, Wheelchair Transfer Minimal Assist  (variable min A-CGA)   Bed Chair Wheelchair Transfer Description Adaptive equipment;Increased time;Initial preparation for task;Set-up of equipment;Supervision for safety;Verbal cueing  (stand step FWW c/ verbal cues for safety d/t impaired R side awareness)   Sitting Lower Body Exercises   Sit to Stand   (x20 no UE support from wc, required multiple attempts c/ min A to complete first couple reps fading to SBA)   Bed Mobility    Sit to Stand Minimal Assist  (from wc to FWW, d/t tendency for retropulsion and R sided LOB)   Neuro-Muscular Treatments   Neuro-Muscular Treatments Anterior weight shift;Postural Facilitation;Sensory Stimuli;Sequencing;Verbal Cuing;Weight Shift Right;Weight Shift Left   Comments Dynamic pregait/gait training in // bars (see PT note for details)   Interdisciplinary Plan of Care Collaboration   Patient Position at End of Therapy Seated;Chair Alarm On;Self Releasing Lap Belt Applied;Call Light within Reach;Tray Table within Reach;Phone within Reach  "    Dynamic pregait/gait training in // bars c/ UE support prn for balance and CGA-min A:  Toe taps to 2\" step approx 30 reps x2 ea LE  Forw/bwd walking 10 ft x10 and x20 - inc difficulty c/ bwd vs forw walking d/t dec BRADY requiring consistent 1 UE support, improved c/ verbal/visual cues to \"straddle\" line on the floor when walking bwd    Assessment    Pt tolerated session well focused on pregait/gait training without R AFO. Cont to present c/ impaired R side awareness and RLE motor control, ie required min-mod A for txfer when turning to sit in wc x1 instance. Pt tends to lose balance posteriorly and to the R with poor reactive strategies however demo'ed improvement within session c/ reps of dynamic standing balance activities.    Strengths: Able to follow instructions, Effective communication skills, Good insight into deficits/needs, Independent prior level of function, Pleasant and cooperative, Motivated for self care and independence  Barriers: Decreased endurance, Fatigue, Generalized weakness, Hemiparesis, Home accessibility, Impaired activity tolerance, Impaired balance, Spasticity    Plan    Gait training with FWW and ant shelf AFO vs R Bioness (trial cane as appropriate), continue treadmill training, stairs (lives in [facility] [address]), reactive standing balance, sit<>stands with emphasis on sequencing/safety/avoiding retropulsion, initiate mat program, RLE and trunk NRE, RLE forced use, txfer training FWW, bed mobility, R sided awareness, floor recovery when appropriate. RT-300 RLE adjunct c/ techs 2-3x/wk as able. Vector profile in [facility].      Passport items to be completed:  Get in/out of bed safely, in/out of a vehicle, safely use mobility device, walk or wheel around home/community, navigate up and down stairs, show how to get up/down from the ground, ensure home is accessible, demonstrate HEP, complete caregiver training    Physical Therapy Problems (Active)       Problem: Mobility       Dates: " Start:  08/05/23         Goal: STG-Within one week, patient will ascend and descend four to six stairs with MOD A and B handrails.        Dates: Start:  08/05/23         Goal Note filed on 08/09/23 1323 by Jonelle Tabares, PT       Not yet assessed                 Problem: Mobility Transfers       Dates: Start:  08/05/23         Goal: STG-Within one week, patient will perform bed mobility including rolling L/R and supine<>sit with MIN A.        Dates: Start:  08/05/23         Goal Note filed on 08/09/23 1323 by Jonelle Tabares, PT       Needs to be reassessed given recent functional progress              Goal: STG-Within one week, patient will transfer bed to chair with FWW and MIN A.        Dates: Start:  08/05/23         Goal Note filed on 08/09/23 1323 by Jonelle Tabares, PT       Mod-max A c/ FWW                 Problem: PT-Long Term Goals       Dates: Start:  08/05/23         Goal: LTG-By discharge, patient will ambulate 75' with FWW and SBA       Dates: Start:  08/05/23            Goal: LTG-By discharge, patient will transfer one surface to another with FWW independently.        Dates: Start:  08/05/23            Goal: LTG-By discharge, patient will ambulate up/down 4-6 stairs with B handrails and SBA       Dates: Start:  08/05/23            Goal: LTG-By discharge, patient will transfer in/out of a car with FWW and supervision.        Dates: Start:  08/05/23

## 2023-08-14 ENCOUNTER — APPOINTMENT (OUTPATIENT)
Dept: PHYSICAL THERAPY | Facility: REHABILITATION | Age: 68
DRG: 057 | End: 2023-08-14
Attending: PHYSICAL MEDICINE & REHABILITATION
Payer: COMMERCIAL

## 2023-08-14 ENCOUNTER — APPOINTMENT (OUTPATIENT)
Dept: SPEECH THERAPY | Facility: REHABILITATION | Age: 68
DRG: 057 | End: 2023-08-14
Attending: PHYSICAL MEDICINE & REHABILITATION
Payer: COMMERCIAL

## 2023-08-14 ENCOUNTER — APPOINTMENT (OUTPATIENT)
Dept: OCCUPATIONAL THERAPY | Facility: REHABILITATION | Age: 68
DRG: 057 | End: 2023-08-14
Attending: PHYSICAL MEDICINE & REHABILITATION
Payer: COMMERCIAL

## 2023-08-14 PROCEDURE — 97130 THER IVNTJ EA ADDL 15 MIN: CPT

## 2023-08-14 PROCEDURE — 97129 THER IVNTJ 1ST 15 MIN: CPT

## 2023-08-14 PROCEDURE — A9270 NON-COVERED ITEM OR SERVICE: HCPCS | Performed by: PHYSICAL MEDICINE & REHABILITATION

## 2023-08-14 PROCEDURE — 97116 GAIT TRAINING THERAPY: CPT

## 2023-08-14 PROCEDURE — 97110 THERAPEUTIC EXERCISES: CPT

## 2023-08-14 PROCEDURE — 770010 HCHG ROOM/CARE - REHAB SEMI PRIVAT*

## 2023-08-14 PROCEDURE — 99232 SBSQ HOSP IP/OBS MODERATE 35: CPT | Performed by: PHYSICAL MEDICINE & REHABILITATION

## 2023-08-14 PROCEDURE — 97535 SELF CARE MNGMENT TRAINING: CPT

## 2023-08-14 PROCEDURE — 97112 NEUROMUSCULAR REEDUCATION: CPT

## 2023-08-14 PROCEDURE — 700102 HCHG RX REV CODE 250 W/ 637 OVERRIDE(OP): Performed by: PHYSICAL MEDICINE & REHABILITATION

## 2023-08-14 PROCEDURE — 97530 THERAPEUTIC ACTIVITIES: CPT

## 2023-08-14 RX ADMIN — SENNOSIDES AND DOCUSATE SODIUM 2 TABLET: 50; 8.6 TABLET ORAL at 09:05

## 2023-08-14 RX ADMIN — OMEPRAZOLE 20 MG: 20 CAPSULE, DELAYED RELEASE ORAL at 09:05

## 2023-08-14 RX ADMIN — SENNOSIDES AND DOCUSATE SODIUM 2 TABLET: 50; 8.6 TABLET ORAL at 20:37

## 2023-08-14 RX ADMIN — LAMOTRIGINE 150 MG: 100 TABLET ORAL at 20:37

## 2023-08-14 RX ADMIN — LOSARTAN POTASSIUM 50 MG: 50 TABLET, FILM COATED ORAL at 05:56

## 2023-08-14 RX ADMIN — APIXABAN 5 MG: 5 TABLET, FILM COATED ORAL at 20:37

## 2023-08-14 RX ADMIN — ATORVASTATIN CALCIUM 80 MG: 40 TABLET, FILM COATED ORAL at 20:37

## 2023-08-14 RX ADMIN — APIXABAN 5 MG: 5 TABLET, FILM COATED ORAL at 09:06

## 2023-08-14 ASSESSMENT — ACTIVITIES OF DAILY LIVING (ADL)
TOILET_TRANSFER_DESCRIPTION: GRAB BAR;INCREASED TIME;SET-UP OF EQUIPMENT;SUPERVISION FOR SAFETY;VERBAL CUEING
BED_CHAIR_WHEELCHAIR_TRANSFER_DESCRIPTION: INCREASED TIME;INITIAL PREPARATION FOR TASK;SET-UP OF EQUIPMENT;SQUAT PIVOT TRANSFER TO WHEELCHAIR;SUPERVISION FOR SAFETY;VERBAL CUEING

## 2023-08-14 ASSESSMENT — PAIN DESCRIPTION - PAIN TYPE: TYPE: ACUTE PAIN

## 2023-08-14 NOTE — CARE PLAN
The patient is Stable - Low risk of patient condition declining or worsening    Shift Goals  Clinical Goals: Pain control, safety  Patient Goals: Safety  Family Goals: Education    Progress made toward(s) clinical / shift goals:  Pt's VSS, RV, pills whole, denies pain, able to participate in therapy.

## 2023-08-14 NOTE — PROGRESS NOTES
Received bedside shift report from Rica SNOWDEN RN regarding patient and assumed care. Patient awake, calm and stable, currently positioned in bed for comfort and safety; call light within reach. Denies pain or discomfort at this time. Will continue to monitor.

## 2023-08-14 NOTE — THERAPY
"Occupational Therapy  Daily Treatment     Patient Name: Doreen Noe  Age:  67 y.o., Sex:  female  Medical Record #: [id number]  Today's Date: 8/14/2023     Precautions  Precautions: (P) Fall Risk  Comments: (P) R hemiparesis, mild R neglect, hx of seizures and L side CVA, Zio patch         Subjective    Pt was alert and cooperative w/ tx.     Objective       08/14/23 1031   Precautions   Precautions Fall Risk   Comments R hemiparesis, mild R neglect, hx of seizures and L side CVA, Zio patch   Vitals   Pulse Oximetry 95 %   O2 (LPM) 0   O2 Delivery Device Room air w/o2 available   Pain   Intervention Declines   Non Verbal Descriptors   Non Verbal Scale  Calm;Unlabored Breathing   Cognition    Level of Consciousness Alert   Comments 1) \"[employer]\" 24 piece puzzle completed in standing + placing puzzle pieces to R to encourage attention to right.  Standing w/ no LOB, additional time w/ Indep self correction - completed task in 10 min and 29 sec.  2) \"Perfection\" in standing and 25 pieces placed to right, no LOB in standing , use of R hand only to manipulate the piece and insert into correct location on board - noted lateral grasp, tip pince, 3-jaw petty.  completed 25 pieces in 5 min and 20 sec.   ABS (Agitated Behavior Scale)   Agitated Behavior Scale Performed No   Sleep/Wake Cycle   Sleep & Rest Awake   Functional Level of Assist   Grooming Modified Independent;Seated   Grooming Description Increased time;Seated in wheelchair at sink   Toileting Minimal Assist   Toileting Description Assist to pull pants up;Grab bar;Increased time;Set-up of equipment;Supervision for safety;Verbal cueing   Toilet Transfers Contact Guard Assist   Toilet Transfer Description Grab bar;Increased time;Set-up of equipment;Supervision for safety;Verbal cueing   Sitting Lower Body Exercises   Nustep Resistance Level 4  (12 mins, rest break x 2, .2 miles)         Assessment    OT tx emphasis on bathroom ADL's/transfer, TherEx and " standing cog task integrating RUE as gross/functional assist - see notes above.  Therapist reviewed/educated pt on environmental/safety awareness, fall precautions, AE/DME.  Barriers include mild R neglect, impaired standing balance, mild delayed coordination RUE, impaired safety.       Plan    Refer to primary OT for POC    Passport items to be completed:  Perform bathroom transfers, complete dressing, complete feeding, get ready for the day, prepare a simple meal, participate in household tasks, adapt home for safety needs, demonstrate home exercise program, complete caregiver training     Occupational Therapy Goals (Active)       Problem: Dressing       Dates: Start:  08/05/23         Goal: STG-Within one week, patient will dress UB at a Mod I level.        Dates: Start:  08/05/23         Goal Note filed on 08/09/23 1437 by Vidhi Mott MS,OTR/L       Requires Min A              Goal: STG-Within one week, patient will dress LB at a Min A level with AE/AD PRN.        Dates: Start:  08/05/23         Goal Note filed on 08/09/23 1437 by Vidhi Mott MS,OTR/L       Inconsistent                  Problem: Functional Transfers       Dates: Start:  08/05/23         Goal: STG-Within one week, patient will transfer to toilet at a CGA level with AE/AD PRN.        Dates: Start:  08/05/23         Goal Note filed on 08/09/23 1437 by Vidhi Mott MS,OTR/L       Requires Min A              Goal: STG-Within one week, patient will transfer to tub/shower at a CGA level with AE/AD/DME PRN.        Dates: Start:  08/05/23         Goal Note filed on 08/09/23 1437 by Vidhi Mott MS,OTR/L       Requires Min A                 Problem: OT Long Term Goals       Dates: Start:  08/05/23         Goal: LTG-By discharge, patient will complete basic self care tasks at a SUP/Mod I level with AE/AD/DME PRN.        Dates: Start:  08/05/23            Goal: LTG-By discharge, patient will perform bathroom transfers at a  SUP/Mod I level with AE/AD/DME PRN.        Dates: Start:  08/05/23            Goal: LTG-By discharge, patient will complete basic home management at a Min A to Mod I level with LRD and AE PRN.        Dates: Start:  08/05/23               Problem: Toileting       Dates: Start:  08/05/23         Goal: STG-Within one week, patient will complete toileting tasks at a CGA level with AE/AD/DME PRN.        Dates: Start:  08/05/23         Goal Note filed on 08/09/23 1437 by Vidhi Mott MS,OTR/L       Requires Mod A

## 2023-08-14 NOTE — PROGRESS NOTES
"Rehab Progress Note     Date of Service: 8/14/2023  Chief Complaint: Follow-up stroke    Interval Events (Subjective)    Patient seen and examined today in the therapy gym.  She is putting together a puzzle with occupational therapy.  She is a bit faster than she was previously.  She reports some intermittent spasms in her right leg at night but these are not bothersome.  She reports the rash on her face has resolved.    Patient has no other new questions, concerns, or complaints today.         Objective:  VITAL SIGNS: BP (!) 142/72   Pulse 74   Temp 36.2 °C (97.2 °F) (Oral)   Resp 18   Ht 1.702 m (5' 7.01\")   Wt 64 kg (141 lb 1.5 oz)   SpO2 96%   BMI 22.09 kg/m²   Gen: alert, no apparent distress  CV: Regular rate, regular rhythm  Resp: Clear to auscultation bilaterally  Neuro: Right leg weakness, intermittent left-sided tremor    No results found for this or any previous visit (from the past 72 hour(s)).        Scheduled Medications   Medication Dose Frequency    losartan  50 mg Q DAY    senna-docusate  2 Tablet BID    omeprazole  20 mg QAM AC    apixaban  5 mg BID    atorvastatin  80 mg Q EVENING    lamoTRIgine  150 mg Q EVENING       Current Diet Order   Procedures    Diet Order Diet: Regular; Tray Modifications (optional): SLP - Deliver to Nursing Station       Assessment:    This patient is a 67 y.o. female admitted for acute inpatient rehabilitation with Ischemic stroke (HCC).      I, Leona Pleitez M.D./MD., was present and led the interdisciplinary team conference on 8/9/2023.  I led the IDT conference and agree with the IDT conference documentation and plan of care as noted below.     Nursing:  Diet Current Diet Order   Procedures    Diet Order Diet: Regular; Tray Modifications (optional): SLP - Deliver to Nursing Station       Eating ADL Supervision  Increased time   % of Last Meal  Oral Nutrition: Lunch, Between 50-75% Consumed (75%)   Sleep No issues   Bowel Last BM: 08/07/23   Bladder Post " Void  13   Barriers to Discharge Home: No issues      Physical Therapy:  Bed Mobility    Transfers Moderate Assist (up to max A when pt fatigued after amb)  Adaptive equipment, Increased time, Initial preparation for task, Set-up of equipment, Supervision for safety, Verbal cueing (stand step FWW, max verbal cues for sequencing especially foot placement)   Mobility Moderate Assist (min A when walking in straight line, mod A when turning to remain upright)   Stairs Unable to Participate   Barriers to Discharge Home: Right hemiparesis, lives alone      Occupational Therapy:  Grooming Supervision, Seated   Bathing Minimal Assist (standing balance/safety when washing buttocks in standing, completed remainder of bathing tasks while seated on fold down shower bench)   UB Dressing Minimal Assist (min A for bra, set-up for t-shirt)   LB Dressing Minimal Assist (to don/doff scrub bottoms while incorporating sitting and standing w/ FWW)   Toileting Moderate Assist   Shower & Tub Transfer Minimal Assist (Min to CGA for wc <> fold down shower bench (stand pivot w/ GB))   Barriers to Discharge Home: Right hemiparesis, impaired problem-solving      Speech-Language Pathology:  To assess patient    Respiratory Therapy:  O2 (LPM): 0  O2 Delivery Device: None - Room Air    Case Management:  Continues to work on disposition and DME needs.      Discharge Date/Disposition:    8/22    HH: versus outpatient    Equip: TBD    Follow-ups: PCP, stroke bridge, cardiology, neurology       Problem List/Medical Decision Making and Plan:      L CAROL ANN stroke  Right hemiparesis  Right neglect  PT/OT, 1.5 hr each discipline, 5 days per week  Access for cognitive impairment, speech therapy ordered    Continue Zio patch, return in 2 weeks    Continue Eliquis and atorvastatin    Outpatient follow-up with stroke Bridge clinic, cardiology, referrals made    Right spasticity  Currently very mild and only at night  Continue to monitor    Seizure  disorder  Last seizure November 2022  Generalized tonic-clonic  Outpatient follow-up with a provider in [place]  Continue lamotrigine     Tremor, left-sided  History of right MCA stroke  Outpatient referral to movement disorder as needed     Hypertension, continues  Continue losartan  Continue hydralazine as needed  Monitor for more day before increasing losartan again    Rash, resolved  Looks like rosacea  Given patient handout information  Outpatient follow-up with dermatology as needed     Hyperlipidemia  Continue atorvastatin    GI prophylaxis  Continue omeprazole    DVT prophylaxis  Continue Merari Pleitez M.D.  Physical Medicine and Rehabilitation

## 2023-08-14 NOTE — THERAPY
"Physical Therapy   Daily Treatment     Patient Name: Doreen Noe  Age:  67 y.o., Sex:  female  Medical Record #: [id number]  Today's Date: 8/14/2023     Precautions  Precautions: Fall Risk  Comments: R hemiparesis, mild R neglect, hx of seizures and L side CVA, Zio patch    Subjective    Pt received up in  talking on the phone, pleasant and agreeable to participate. Reported that she plans to move her bedroom downstairs so she can live on the first floor upon d/c home.     Objective       08/14/23 1431   PT Charge Group   PT Gait Training (Units) 1   PT Therapeutic Exercise (Units) 1   PT Total Time Spent   PT Individual Total Time Spent (Mins) 30   Stairs Functional Level of Assist   Level of Assist with Stairs Standby Assist  (CGA fading to SBA)   # of Stairs Climbed 12  (12x2 sets on 6\" stairs - x1 set BHR and x1 set RHR sidestepping to simulate home entrance)   Stairs Description Extra time;Hand rails;Supervision for safety;Verbal cueing  (step to vs through asc, step to desc)   Sitting Lower Body Exercises   Sit to Stand   (2x5 no UE support from , required up to min A for balance d/t tendency for R sided lateral lean)   Bed Mobility    Sit to Stand Minimal Assist  (d/t tendency for R sided LOB)   Interdisciplinary Plan of Care Collaboration   IDT Collaboration with  Family / Caregiver   Patient Position at End of Therapy Seated;Family / Friend in Room;Other (Comments)  (family member wheeling pt back to room from main gym)   Collaboration Comments family member arrived during last 10 min of session         Assessment    Pt tolerated session well focused on stair training and sit<>stands. Demo'ed best safety with step to pattern asc/desc vs step through pattern on stairs d/t RLE weakness. Would benefit from cont RLE strengthening plus standing balance training away from tendency for R sided and posterior LOB.    Strengths: Able to follow instructions, Effective communication skills, Good insight into " deficits/needs, Independent prior level of function, Pleasant and cooperative, Motivated for self care and independence  Barriers: Decreased endurance, Fatigue, Generalized weakness, Hemiparesis, Home accessibility, Impaired activity tolerance, Impaired balance, Spasticity    Plan    Gait training with FWW (trial cane as appropriate), continue treadmill training vs R Bioness gait training, stairs (lives in [facility] 2SH 5 DENZEL RHR), reactive standing balance, sit<>stands with emphasis on sequencing/safety/avoiding retropulsion, mat program, RLE and trunk NRE, RLE forced use, txfer training FWW, bed mobility, R sided awareness, floor recovery when appropriate. RT-300 RLE adjunct c/ techs 2-3x/wk as able. Vector profile in [facility].      Passport items to be completed:  Get in/out of bed safely, in/out of a vehicle, safely use mobility device, walk or wheel around home/community, navigate up and down stairs, show how to get up/down from the ground, ensure home is accessible, demonstrate HEP, complete caregiver training    Physical Therapy Problems (Active)       Problem: Mobility       Dates: Start:  08/05/23         Goal: STG-Within one week, patient will ascend and descend four to six stairs with MOD A and B handrails.        Dates: Start:  08/05/23         Goal Note filed on 08/09/23 1323 by Jonelle Tabares, PT       Not yet assessed                 Problem: Mobility Transfers       Dates: Start:  08/05/23         Goal: STG-Within one week, patient will perform bed mobility including rolling L/R and supine<>sit with MIN A.        Dates: Start:  08/05/23         Goal Note filed on 08/09/23 1323 by Jonelle Tabares, PT       Needs to be reassessed given recent functional progress              Goal: STG-Within one week, patient will transfer bed to chair with FWW and MIN A.        Dates: Start:  08/05/23         Goal Note filed on 08/09/23 1323 by Jonelle Tabares, PT       Mod-max A c/ FWW                 Problem: PT-Long Term  Goals       Dates: Start:  08/05/23         Goal: LTG-By discharge, patient will ambulate 75' with FWW and SBA       Dates: Start:  08/05/23            Goal: LTG-By discharge, patient will transfer one surface to another with FWW independently.        Dates: Start:  08/05/23            Goal: LTG-By discharge, patient will ambulate up/down 4-6 stairs with B handrails and SBA       Dates: Start:  08/05/23            Goal: LTG-By discharge, patient will transfer in/out of a car with FWW and supervision.        Dates: Start:  08/05/23

## 2023-08-14 NOTE — DISCHARGE PLANNING
Case Management / Initial authorization:  Auth # [id number]  Days authorized:8/4 to 8/10  Clinical concurrent review faxed via [employer] Fax today on 8/9     Name: John  Phone: [phone]  Fax:[phone]   Outcome: pending      8/11/2023  Tc to [facility] re status of concurrent review; they did not receive; re faxed this am.             Addendum 8/14/2023  Tc from [place] @ [facility].   Pt approved from 8/11 to 8/22 with dc date planned for 8/22.  Dc summary to be faxed to

## 2023-08-14 NOTE — THERAPY
Speech Language Pathology  Daily Treatment     Patient Name: Doreen Noe  Age:  67 y.o., Sex:  female  Medical Record #: [id number]  Today's Date: 8/14/2023     Precautions  Precautions: Fall Risk  Comments: R hemiparesis, mild R neglect, hx of seizures and L side CVA, Zio patch    Subjective    Pt pleasant and cooperative.      Objective       08/14/23 1403   Treatment Charges   SLP Cognitive Skill Development First 15 Minutes 1   SLP Cognitive Skill Development Additional 15 Minutes 1   SLP Total Time Spent   SLP Individual Total Time Spent (Mins) 30         Assessment    Functional medication sort completed at this time, pt correctly sorted 5/6 medications. Pt with single error sorting one tablet two times a days vs the prescribed two tablets two times a day. Pt indep able to identify error and verbalize how to correct.     Strengths: Able to follow instructions, Alert and oriented, Effective communication skills, Independent prior level of function, Manages pain appropriately, Pleasant and cooperative, Supportive family, Motivated for self care and independence, Willingly participates in therapeutic activities (chronic tremors, some visual impairment from cataracts per patient report)  Barriers: Hemiparesis    Plan  Repeat medication sort     Speech Therapy Problems (Active)       Problem: Memory STGs       Dates: Start:  08/10/23         Goal: STG-Within one week, patient will use compensatory memory strategies and external memory aids to recall daily events and safety sequencing with 80% acc with min cues.       Dates: Start:  08/10/23            Goal: STG-Within one week, patient will perform medication management tasks with 90% acc or better with task set up.       Dates: Start:  08/10/23               Problem: Speech/Swallowing LTGs       Dates: Start:  08/10/23         Goal: LTG-By discharge, patient will solve complex problem and recall new training for safety with 90% acc mod I for safe discharge  home.       Dates: Start:  08/10/23

## 2023-08-14 NOTE — CARE PLAN
"  Problem: Knowledge Deficit - Standard  Goal: Patient and family/care givers will demonstrate understanding of plan of care, disease process/condition, diagnostic tests and medications  Outcome: Progressing  Note: Pt agrees with plan of care tonight regarding medications and safety.  Will continue to monitor patient.      Problem: Fall Risk - Rehab  Goal: Patient will remain free from falls  Outcome: Progressing  Note: Mariela Rodgers Fall risk Assessment Score:  16    High fall risk Interventions   - Alarming seatbelt  - Wander guard  - Bed and strip alarm   - Yellow sign by the door   - Yellow wrist band \"Fall risk\"  - Room near to the nurse station  - Do not leave patient unattended in the bathroom  - Fall risk education provided           The patient is Stable - Low risk of patient condition declining or worsening    Shift Goals  Clinical Goals: Safety  Patient Goals: Sleep well  Family Goals: Education    Progress made toward(s) clinical / shift goals:  progressing            "

## 2023-08-14 NOTE — THERAPY
"Physical Therapy   Daily Treatment     Patient Name: Doreen Noe  Age:  67 y.o., Sex:  female  Medical Record #: [id number]  Today's Date: 8/14/2023     Precautions  Precautions: Fall Risk  Comments: R hemiparesis, mild R neglect, hx of seizures and L side CVA, Zio patch    Subjective    Pt received up in  in cafeteria, pleasant and agreeable to participate. Reported fatigue at end of session, \"I feel weak.\" Provided encouragement that mat program is intended to be challenging to maximize strength and NMR gains.      Objective       08/14/23 0831   PT Charge Group   PT Therapeutic Exercise (Units) 2   PT Neuromuscular Re-Education / Balance (Units) 2   PT Total Time Spent   PT Individual Total Time Spent (Mins) 60   Transfer Functional Level of Assist   Bed, Chair, Wheelchair Transfer Contact Guard Assist  (CGA-min A when fatigued at end of session)   Bed Chair Wheelchair Transfer Description Increased time;Initial preparation for task;Set-up of equipment;Squat pivot transfer to wheelchair;Supervision for safety;Verbal cueing  (squat pivot mat table<>wc)   Supine Lower Body Exercise   Comments see PT note for details of mat program   Bed Mobility    Supine to Sit Minimal Assist  (from supine>short sitting EOM, min A to bring trunk upright d/t fatigue at end of session)   Sit to Supine Standby Assist  (mat table and bed)   Scooting Standby Assist  (mat table)   Rolling Standby Assist  (mat table supine<>prone)   Neuro-Muscular Treatments   Neuro-Muscular Treatments Anterior weight shift;Compensatory Strategies;Facilitation;Joint Approximation;Sequencing;Tactile Cuing;Verbal Cuing;Weight Shift Right;Weight Shift Left   Comments see PT note for details of mat program   Interdisciplinary Plan of Care Collaboration   IDT Collaboration with  Nursing   Patient Position at End of Therapy In Bed;Bed Alarm On;Call Light within Reach;Tray Table within Reach;Phone within Reach   Collaboration Comments RN gave meds     Mat " "program:  Prone strengthening exercises/stretches:  R knee hike x12 and x5  R HS curl x10  R hip ext x10 - required AAROM to achieve upper thigh lift off mat table  Marie pose stretch x30-60 sec  NMR/strengthening exercises/RLE and RUE joint approximation:  Quadruped forw/bwd weigt shifts x12 - required tactile cues/support at R elbow to prevent collapse  Tall kneeling modified squat c/ BUE support on large theraball x15  Quadruped alternating leg extension x5 and x3 - required tactile cues/support at R elbow to prevent collapse  Tall kneeling forw/bwd \"walking\" x12 ft total c/ BUE support on large theraball and 1 RB  Tall kneeling sideways \"walking\" 5 ft x4 in both directions c/ BUE support on large theraball and 1 RB    Assessment    Pt tolerated session well focused on initiation of mat program for core and RLE/RUE strengthening, R sided awareness, and RLE/RUE joint approximation. Required assist at R elbow in quadruped to prevent RUE collapse but otherwise performed all exercises and positioning with grossly CGA-SBA.    Strengths: Able to follow instructions, Effective communication skills, Good insight into deficits/needs, Independent prior level of function, Pleasant and cooperative, Motivated for self care and independence  Barriers: Decreased endurance, Fatigue, Generalized weakness, Hemiparesis, Home accessibility, Impaired activity tolerance, Impaired balance, Spasticity    Plan    Gait training with FWW and ant shelf AFO vs R Bioness (trial cane as appropriate), continue treadmill training, stairs (lives in [facility] 2SH 5 DENZEL), reactive standing balance, sit<>stands with emphasis on sequencing/safety/avoiding retropulsion, mat program, RLE and trunk NRE, RLE forced use, txfer training FWW, bed mobility, R sided awareness, floor recovery when appropriate. RT-300 RLE adjunct c/ techs 2-3x/wk as able. Vector profile in [facility].      Passport items to be completed:  Get in/out of bed safely, in/out of a " vehicle, safely use mobility device, walk or wheel around home/community, navigate up and down stairs, show how to get up/down from the ground, ensure home is accessible, demonstrate HEP, complete caregiver training    Physical Therapy Problems (Active)       Problem: Mobility       Dates: Start:  08/05/23         Goal: STG-Within one week, patient will ascend and descend four to six stairs with MOD A and B handrails.        Dates: Start:  08/05/23         Goal Note filed on 08/09/23 1323 by Jonelle Tabares, PT       Not yet assessed                 Problem: Mobility Transfers       Dates: Start:  08/05/23         Goal: STG-Within one week, patient will perform bed mobility including rolling L/R and supine<>sit with MIN A.        Dates: Start:  08/05/23         Goal Note filed on 08/09/23 1323 by Jonelle Tabares, PT       Needs to be reassessed given recent functional progress              Goal: STG-Within one week, patient will transfer bed to chair with FWW and MIN A.        Dates: Start:  08/05/23         Goal Note filed on 08/09/23 1323 by Jonelle Tabares, PT       Mod-max A c/ FWW                 Problem: PT-Long Term Goals       Dates: Start:  08/05/23         Goal: LTG-By discharge, patient will ambulate 75' with FWW and SBA       Dates: Start:  08/05/23            Goal: LTG-By discharge, patient will transfer one surface to another with FWW independently.        Dates: Start:  08/05/23            Goal: LTG-By discharge, patient will ambulate up/down 4-6 stairs with B handrails and SBA       Dates: Start:  08/05/23            Goal: LTG-By discharge, patient will transfer in/out of a car with FWW and supervision.        Dates: Start:  08/05/23

## 2023-08-15 ENCOUNTER — APPOINTMENT (OUTPATIENT)
Dept: SPEECH THERAPY | Facility: REHABILITATION | Age: 68
DRG: 057 | End: 2023-08-15
Attending: PHYSICAL MEDICINE & REHABILITATION
Payer: COMMERCIAL

## 2023-08-15 ENCOUNTER — APPOINTMENT (OUTPATIENT)
Dept: OCCUPATIONAL THERAPY | Facility: REHABILITATION | Age: 68
DRG: 057 | End: 2023-08-15
Attending: PHYSICAL MEDICINE & REHABILITATION
Payer: COMMERCIAL

## 2023-08-15 ENCOUNTER — APPOINTMENT (OUTPATIENT)
Dept: PHYSICAL THERAPY | Facility: REHABILITATION | Age: 68
DRG: 057 | End: 2023-08-15
Attending: PHYSICAL MEDICINE & REHABILITATION
Payer: COMMERCIAL

## 2023-08-15 PROCEDURE — 97530 THERAPEUTIC ACTIVITIES: CPT

## 2023-08-15 PROCEDURE — 97129 THER IVNTJ 1ST 15 MIN: CPT

## 2023-08-15 PROCEDURE — A9270 NON-COVERED ITEM OR SERVICE: HCPCS | Performed by: PHYSICAL MEDICINE & REHABILITATION

## 2023-08-15 PROCEDURE — 97116 GAIT TRAINING THERAPY: CPT

## 2023-08-15 PROCEDURE — 97110 THERAPEUTIC EXERCISES: CPT

## 2023-08-15 PROCEDURE — 97112 NEUROMUSCULAR REEDUCATION: CPT

## 2023-08-15 PROCEDURE — 97130 THER IVNTJ EA ADDL 15 MIN: CPT

## 2023-08-15 PROCEDURE — 700102 HCHG RX REV CODE 250 W/ 637 OVERRIDE(OP): Performed by: PHYSICAL MEDICINE & REHABILITATION

## 2023-08-15 PROCEDURE — 99232 SBSQ HOSP IP/OBS MODERATE 35: CPT | Performed by: PHYSICAL MEDICINE & REHABILITATION

## 2023-08-15 PROCEDURE — 770010 HCHG ROOM/CARE - REHAB SEMI PRIVAT*

## 2023-08-15 PROCEDURE — 97535 SELF CARE MNGMENT TRAINING: CPT

## 2023-08-15 RX ADMIN — APIXABAN 5 MG: 5 TABLET, FILM COATED ORAL at 09:11

## 2023-08-15 RX ADMIN — OMEPRAZOLE 20 MG: 20 CAPSULE, DELAYED RELEASE ORAL at 09:11

## 2023-08-15 RX ADMIN — ATORVASTATIN CALCIUM 80 MG: 40 TABLET, FILM COATED ORAL at 20:00

## 2023-08-15 RX ADMIN — APIXABAN 5 MG: 5 TABLET, FILM COATED ORAL at 20:01

## 2023-08-15 RX ADMIN — LAMOTRIGINE 150 MG: 100 TABLET ORAL at 20:00

## 2023-08-15 RX ADMIN — LOSARTAN POTASSIUM 50 MG: 50 TABLET, FILM COATED ORAL at 05:30

## 2023-08-15 ASSESSMENT — BALANCE ASSESSMENTS
STANDING UNSUPPORTED WITH FEET TOGETHER: 3
TURN 360 DEGREES: 0
SITTING TO STANDING: 3
REACHING FORWARD WITH OUTSTRETCHED ARM WHILE STANDING: 3
SITTING UNSUPPORTED: 4
LONG VERSION TOTAL SCORE (MAX 56): 35
STANDING ON ONE LEG: 1
MEDICARE IMPAIRMENT PERCENTAGE: 38
LONG VERSION TOTAL SCORE (MAX 56): 35
STANDING UNSUPPORTED WITH EYES CLOSED: 4
STANDING UNSUPPORTED: 4
STANDING UNSUPPORTED ONE FOOT IN FRONT: 1
LOOK OVER LEFT AND RIGHT SHOULDERS WHILE STANDING: 4
STANDING TO SITTING: 3
PLACE ALTERNATE FOOT ON STEP OR STOOL WHILE STANDING UNSUPPORTED: 0
TRANSFERS: 2
PICK UP OBJECT FROM THE FLOOR FROM A STANDING POSITION: 3

## 2023-08-15 ASSESSMENT — GAIT ASSESSMENTS
DISTANCE (FEET): 240
DEVIATION: DECREASED BASE OF SUPPORT;BRADYKINETIC;DECREASED HEEL STRIKE;DECREASED TOE OFF
ASSISTIVE DEVICE: SINGLE POINT CANE
DEVIATION: DECREASED BASE OF SUPPORT;BRADYKINETIC;DECREASED HEEL STRIKE;DECREASED TOE OFF
DEVIATION: DECREASED BASE OF SUPPORT;DECREASED HEEL STRIKE;DECREASED TOE OFF
GAIT LEVEL OF ASSIST: CONTACT GUARD ASSIST
DISTANCE (FEET): 200
ASSISTIVE DEVICE: SINGLE POINT CANE
GAIT LEVEL OF ASSIST: CONTACT GUARD ASSIST
DISTANCE (FEET): 100
GAIT LEVEL OF ASSIST: CONTACT GUARD ASSIST

## 2023-08-15 ASSESSMENT — ACTIVITIES OF DAILY LIVING (ADL)
TOILET_TRANSFER_DESCRIPTION: GRAB BAR;INCREASED TIME;SET-UP OF EQUIPMENT;SUPERVISION FOR SAFETY
BED_CHAIR_WHEELCHAIR_TRANSFER_DESCRIPTION: ADAPTIVE EQUIPMENT;INCREASED TIME;INITIAL PREPARATION FOR TASK;SUPERVISION FOR SAFETY;VERBAL CUEING
BED_CHAIR_WHEELCHAIR_TRANSFER_DESCRIPTION: ADAPTIVE EQUIPMENT;INCREASED TIME;SUPERVISION FOR SAFETY;VERBAL CUEING
BED_CHAIR_WHEELCHAIR_TRANSFER_DESCRIPTION: ADAPTIVE EQUIPMENT;INCREASED TIME;SET-UP OF EQUIPMENT;SUPERVISION FOR SAFETY
TOILETING_LEVEL_OF_ASSIST_DESCRIPTION: GRAB BAR;INCREASED TIME;SET-UP OF EQUIPMENT;SUPERVISION FOR SAFETY

## 2023-08-15 ASSESSMENT — PAIN DESCRIPTION - PAIN TYPE: TYPE: ACUTE PAIN

## 2023-08-15 NOTE — PROGRESS NOTES
NURSING DAILY NOTE    Name: Doreen Noe   Date of Admission: 8/4/2023   Admitting Diagnosis: Ischemic stroke (HCC)  Attending Physician: Leona Pleitez M.d.  Allergies: Patient has no known allergies.    Safety  Patient Assist  Min/CGA Assist  Patient Precautions  Fall Risk  Precaution Comments  R hemiparesis, mild R neglect, hx of seizures and L side CVA, Zio patch  Bed Transfer Status  Contact Guard Assist (CGA-min A when fatigued at end of session)  Toilet Transfer Status   Contact Guard Assist  Assistive Devices  Rails, Wheelchair  Oxygen  None - Room Air  Diet/Therapeutic Dining  Current Diet Order   Procedures    Diet Order Diet: Regular; Tray Modifications (optional): SLP - Deliver to Nursing Station     Pill Administration  whole and one at a time   Agitated Behavioral Scale     ABS Level of Severity       Fall Risk  Has the patient had a fall this admission?   No  Mariela Rodgers Fall Risk Scoring  16, HIGH RISK  Fall Risk Safety Measures  bed alarm, poor balance, and low vision/ hearing    Vitals  Temperature: 36.6 °C (97.9 °F)  Temp src: Oral  Pulse: 68  Respiration: 17  Blood Pressure : 127/77  Blood Pressure MAP (Calculated): 94 MM HG  BP Location: Left, Upper Arm  Patient BP Position: Supine     Oxygen  Pulse Oximetry: 96 %  O2 (LPM): 0  O2 Delivery Device: None - Room Air    Bowel and Bladder  Last Bowel Movement  08/13/23  Stool Type  Type 4: Like a sausage or snake, smooth and soft  Bowel Device  Bathroom, Other (Comment) (Bowel Meds)  Continent  Bladder: Continent void   Bowel: Continent movement  Bladder Function  Urine Void (mL):  (Moderate)  Number of Times Voided: 1  Urinary Options: Yes  Urine Color: Yellow  Genitourinary Assessment   Bladder Assessment (WDL):  Within Defined Limits  Engel Catheter: Not Applicable  Urine Color: Yellow  Bladder Device: Bathroom  Bladder Scan: Post Void  $ Bladder Scan Results (mL):  13    Skin  Angus Score   17  Sensory Interventions   Bed Types: Standard/Trauma Mattress with Overlay  Skin Preventative Measures: Pillows in Use for Support / Positioning  Moisture Interventions  Moisturizers/Barriers: Barrier Wipes      Pain  Pain Rating Scale  0 - No Pain  Pain Location  Neck  Pain Location Orientation     Pain Interventions   Declines    ADLs    Bathing   Shower, * * With Assistance from, Self Care, No Assistance Required  Linen Change      Personal Hygiene  Moist Preeti Wipes  Chlorhexidine Bath      Oral Care  Brushed Teeth  Teeth/Dentures     Shave     Nutrition Percentage Eaten  Between % Consumed  Environmental Precautions  Treaded Slipper Socks on Patient, Bed in Low Position  Patient Turns/Positioning  Patient Turns Self from Side to Side  Patient Turns Assistance/Tolerance     Bed Positions  Bed Controls On, Bed Locked  Head of Bed Elevated  Self regulated      Psychosocial/Neurologic Assessment  Psychosocial Assessment  Psychosocial (WDL):  Within Defined Limits  Neurologic Assessment  Neuro (WDL): Exceptions to WDL  Level of Consciousness: Alert  Orientation Level: Oriented X4  Cognition: Appropriate judgement, Appropriate safety awareness, Appropriate attention/concentration, Appropriate for developmental age, Follows commands  Speech: Clear  Pupil Assesment: No  Motor Function/Sensation Assessment: Dorsiflexion, Motor response, Sensation, Motor strength  R Foot Dorsiflexion: Weak  L Foot Dorsiflexion: Strong  RUE Motor Response: Responds to commands  RUE Sensation: Full sensation  Muscle Strength Right Arm: Good Strength Against Gravity and Moderate Resistance  LUE Motor Response: Tremors, Responds to commands  LUE Sensation: Full sensation  Muscle Strength Left Arm: Normal Strength Against Gravity and Full Resistance  RLE Motor Response: Responds to commands  RLE Sensation: Numbness  Muscle Strength Right Leg: Weak Movement but Not Against Gravity or Resistance  LLE Motor  Response: Responds to commands  LLE Sensation: Full sensation  Muscle Strength Left Leg: Normal Strength Against Gravity and Full Resistance  EENT (WDL):  WDL Except    Cardio/Pulmonary Assessment  Edema      Respiratory Breath Sounds  RUL Breath Sounds: Clear  RML Breath Sounds: Clear  RLL Breath Sounds: Clear  MONIKA Breath Sounds: Clear  LLL Breath Sounds: Clear  Cardiac Assessment   Cardiac (WDL):  WDL Except (H/O HTN.)

## 2023-08-15 NOTE — THERAPY
Physical Therapy   Daily Treatment     Patient Name: Doreen Noe  Age:  67 y.o., Sex:  female  Medical Record #: [id number]  Today's Date: 8/15/2023     Precautions  Precautions: Fall Risk  Comments: R hemiparesis, mild R neglect, hx of seizures and L side CVA, Zio patch    Subjective    Pt received up in wc ready for therapy, pleasant and agreeable to participate.      Objective       08/15/23 0701   PT Charge Group   PT Gait Training (Units) 2   PT Neuromuscular Re-Education / Balance (Units) 2   PT Total Time Spent   PT Individual Total Time Spent (Mins) 60   Gait Functional Level of Assist    Gait Level Of Assist Contact Guard Assist  (x1-2 instances of min A for balance)   Assistive Device Front Wheel Walker;Quad Cane;Single Point Cane   Distance (Feet) 240   # of Times Distance was Traveled 3  (x1 time with ea AD)   Deviation Decreased Base Of Support;Bradykinetic;Decreased Heel Strike;Decreased Toe Off   Wheelchair Functional Level of Assist   Wheelchair Assist Supervised   Distance Wheelchair (Feet or Distance) 80   Wheelchair Description Extra time;Impaired coordination;Supervision for safety  (BUE/BLE propulsion)   Transfer Functional Level of Assist   Bed, Chair, Wheelchair Transfer Contact Guard Assist   Bed Chair Wheelchair Transfer Description Adaptive equipment;Increased time;Supervision for safety;Verbal cueing  (stand step various ADs vs no AD)   Sitting Lower Body Exercises   Sit to Stand   (2x12 no UE support from lowered EOM, B toes on wedge to encourage anterior lean)   Bed Mobility    Sit to Stand Contact Guard Assist  (min A-SBA d/t tendency for retropulsion and R sided LOB)   Ventura Balance Scale   Sitting Unsupported (Score 0-4) 4   Change Of Positon: Sitting To Standing (Score 0-4) 3   Change Of Positon: Standing To Sitting (Score 0-4) 3   Transfers (Score 0-4) 2   Standing Unsupported (Score 0-4) 4   Standing With Eyes Closed (Score 0-4) 4   Standing With Feet Together (Score 0-4) 3    Tandem Standing (Score 0-4) 1  (28 sec before R sided LOB, R foot leading)   Standing On One Leg (Score 0-4) 1  (lifting L foot)   Turning Trunk (Feet Fixed) (Score 0-4) 4   Retrieving Objects From Floor (Score 0-4) 3   Turning 360 Degrees (Score 0-4) 0  (min A to correct LOB when turning L)   Stool Stepping (Score 0-4) 0   Reaching Forward While Standing (Score 0-4) 3   Ventura Balance Total Score (0-56) 35   Interdisciplinary Plan of Care Collaboration   Patient Position at End of Therapy Seated;Chair Alarm On;Self Releasing Lap Belt Applied;Other (Comments)  (pt self-propelling from main gym to cafeteria for bfast)         Assessment    Pt tolerated session well focused on gait training with various AD options plus balance assessment. Required only CGA to amb with FWW, SBQC, and SPC. Pt expressed preference for SPC and would benefit from cont training to improve balance. Scored 35/56 on the Ventura which indicates that pt is an inc fall risk at this time.    Strengths: Able to follow instructions, Effective communication skills, Good insight into deficits/needs, Independent prior level of function, Pleasant and cooperative, Motivated for self care and independence  Barriers: Decreased endurance, Fatigue, Generalized weakness, Hemiparesis, Home accessibility, Impaired activity tolerance, Impaired balance, Spasticity    Plan    Gait training with FWW vs SPC, continue treadmill training vs R Bioness gait training, stairs (lives in [facility] 2SH 5 DENZEL RHR), reactive standing balance, sit<>stands with emphasis on sequencing/safety/avoiding retropulsion, mat program, RLE and trunk NRE, RLE forced use, txfer training FWW, bed mobility, R sided awareness, floor recovery when appropriate. RT-300 RLE adjunct c/ techs 2-3x/wk as able. Vector profile in [facility].      Passport items to be completed:  Get in/out of bed safely, in/out of a vehicle, safely use mobility device, walk or wheel around home/community, navigate up and  down stairs, show how to get up/down from the ground, ensure home is accessible, demonstrate HEP, complete caregiver training    Physical Therapy Problems (Active)       Problem: Mobility       Dates: Start:  08/05/23         Goal: STG-Within one week, patient will ascend and descend four to six stairs with MOD A and B handrails.        Dates: Start:  08/05/23         Goal Note filed on 08/09/23 1323 by Jonelle Tabares, PT       Not yet assessed                 Problem: Mobility Transfers       Dates: Start:  08/05/23         Goal: STG-Within one week, patient will perform bed mobility including rolling L/R and supine<>sit with MIN A.        Dates: Start:  08/05/23         Goal Note filed on 08/09/23 1323 by Jonelle Tabares, PT       Needs to be reassessed given recent functional progress              Goal: STG-Within one week, patient will transfer bed to chair with FWW and MIN A.        Dates: Start:  08/05/23         Goal Note filed on 08/09/23 1323 by Jonelle Tabares, PT       Mod-max A c/ FWW                 Problem: PT-Long Term Goals       Dates: Start:  08/05/23         Goal: LTG-By discharge, patient will ambulate 75' with FWW and SBA       Dates: Start:  08/05/23            Goal: LTG-By discharge, patient will transfer one surface to another with FWW independently.        Dates: Start:  08/05/23            Goal: LTG-By discharge, patient will ambulate up/down 4-6 stairs with B handrails and SBA       Dates: Start:  08/05/23            Goal: LTG-By discharge, patient will transfer in/out of a car with FWW and supervision.        Dates: Start:  08/05/23

## 2023-08-15 NOTE — THERAPY
"Physical Therapy   Daily Treatment     Patient Name: Doreen Noe  Age:  67 y.o., Sex:  female  Medical Record #: [id number]  Today's Date: 8/15/2023     Precautions  Precautions: Fall Risk  Comments: R hemiparesis, mild R neglect, hx of seizures and L side CVA, Zio patch    Subjective    \"I'm good\"     Objective       08/15/23 0831   PT Charge Group   PT Gait Training (Units) 1   PT Therapeutic Exercise (Units) 1   PT Total Time Spent   PT Individual Total Time Spent (Mins) 30   Pain 0 - 10 Group   Pain Rating Scale (NPRS) 0   Gait Functional Level of Assist    Gait Level Of Assist Contact Guard Assist   Assistive Device Single Point Cane   Distance (Feet) 200   # of Times Distance was Traveled 1  (additional bout of 120ft with SPC)   Deviation Decreased Base Of Support;Decreased Heel Strike;Decreased Toe Off  (intermittent cross over stepping with CG to maintain balance, cues for upward gaze and RUE arm swing)   Sitting Lower Body Exercises   Ankle Pumps 3 sets of 10;Bilateral   Hip Flexion 1 set of 10;Bilateral   Long Arc Quad 1 set of 10;Bilateral   Sit to Stand 1 set of 10  (with LLE on 4\" step, no UE support, cues for R side weight acceptance and eccentric control. CG, with intermittent min-modA d/t posterior LOB)   Bed Mobility    Sit to Stand Minimal Assist  (to SPC, Christy for steadying)   Neuro-Muscular Treatments   Comments alternating toe taps to 4\" block, initially with SPC and progressed to no AD. Christy throughout, initially for L weight shift and progressed to intermittent Christy d/t consistent posterior LOB   Interdisciplinary Plan of Care Collaboration   Patient Position at End of Therapy Seated;Chair Alarm On;Call Light within Reach         Assessment    Pt tolerated session well and participatory throughout. Pt reports feeling good with progress. Education provided on AD progression, LE strengthening, and gait deviations.     Strengths: Able to follow instructions, Effective communication skills, " Good insight into deficits/needs, Independent prior level of function, Pleasant and cooperative, Motivated for self care and independence  Barriers: Decreased endurance, Fatigue, Generalized weakness, Hemiparesis, Home accessibility, Impaired activity tolerance, Impaired balance, Spasticity    Plan    Gait training with FWW vs SPC, continue treadmill training vs R Bioness gait training, stairs (lives in [facility] 2SH 5 DENZEL [facility]), reactive standing balance, sit<>stands with emphasis on sequencing/safety/avoiding retropulsion, mat program, RLE and trunk NRE, RLE forced use, txfer training FWW, bed mobility, R sided awareness, floor recovery when appropriate. RT-300 RLE adjunct c/ techs 2-3x/wk as able. Vector profile in [facility].     Passport items to be completed:  Get in/out of bed safely, in/out of a vehicle, safely use mobility device, walk or wheel around home/community, navigate up and down stairs, show how to get up/down from the ground, ensure home is accessible, demonstrate HEP, complete caregiver training    Physical Therapy Problems (Active)       Problem: Mobility       Dates: Start:  08/05/23         Goal: STG-Within one week, patient will ascend and descend four to six stairs with MOD A and B handrails.        Dates: Start:  08/05/23         Goal Note filed on 08/09/23 1323 by Jonelle Tabares, PT       Not yet assessed                 Problem: Mobility Transfers       Dates: Start:  08/05/23         Goal: STG-Within one week, patient will perform bed mobility including rolling L/R and supine<>sit with MIN A.        Dates: Start:  08/05/23         Goal Note filed on 08/09/23 1323 by Jonelle Tabares, PT       Needs to be reassessed given recent functional progress              Goal: STG-Within one week, patient will transfer bed to chair with FWW and MIN A.        Dates: Start:  08/05/23         Goal Note filed on 08/09/23 1323 by Jonelle Tabares, PT       Mod-max A c/ FWW                 Problem: PT-Long Term Goals        Dates: Start:  08/05/23         Goal: LTG-By discharge, patient will ambulate 75' with FWW and SBA       Dates: Start:  08/05/23            Goal: LTG-By discharge, patient will transfer one surface to another with FWW independently.        Dates: Start:  08/05/23            Goal: LTG-By discharge, patient will ambulate up/down 4-6 stairs with B handrails and SBA       Dates: Start:  08/05/23            Goal: LTG-By discharge, patient will transfer in/out of a car with FWW and supervision.        Dates: Start:  08/05/23

## 2023-08-15 NOTE — PROGRESS NOTES
NURSING DAILY NOTE    Name: Doreen Noe   Date of Admission: 8/4/2023   Admitting Diagnosis: Ischemic stroke (HCC)  Attending Physician: Leona Pleitez M.d.  Allergies: Patient has no known allergies.    Safety  Patient Assist  Min/CGA Assist  Patient Precautions  Fall Risk  Precaution Comments  R hemiparesis, mild R neglect, hx of seizures and L side CVA, Zio patch  Bed Transfer Status  Contact Guard Assist (CGA-min A when fatigued at end of session)  Toilet Transfer Status   Contact Guard Assist  Assistive Devices  Wheelchair  Oxygen  None - Room Air  Diet/Therapeutic Dining  Current Diet Order   Procedures    Diet Order Diet: Regular; Tray Modifications (optional): SLP - Deliver to Nursing Station     Pill Administration  whole  Agitated Behavioral Scale     ABS Level of Severity       Fall Risk  Has the patient had a fall this admission?   No  Mariela Rodgers Fall Risk Scoring  16, HIGH RISK  Fall Risk Safety Measures  bed alarm, poor balance, low vision/ hearing, and ok to leave pt in bathroom    Vitals  Temperature: 36.3 °C (97.4 °F)  Temp src: Oral  Pulse: 76  Respiration: 18  Blood Pressure : 134/76  Blood Pressure MAP (Calculated): 95 MM HG  BP Location: Right, Upper Arm  Patient BP Position: Supine     Oxygen  Pulse Oximetry: 93 %  O2 (LPM): 0  O2 Delivery Device: None - Room Air    Bowel and Bladder  Last Bowel Movement  08/13/23  Stool Type  Type 4: Like a sausage or snake, smooth and soft  Bowel Device  Bathroom, Other (Comment) (Bowel Meds)  Continent  Bladder: Continent void   Bowel: Continent movement  Bladder Function  Urine Void (mL):  (large)  Number of Times Voided: 1  Urinary Options: Yes  Urine Color: Unable To Evaluate  Genitourinary Assessment   Bladder Assessment (WDL):  Within Defined Limits  Engel Catheter: Not Applicable  Urine Color: Unable To Evaluate  Bladder Device: Bathroom  Bladder Scan: Post Void  $ Bladder Scan  Results (mL): 13    Skin  Angus Score   17  Sensory Interventions   Bed Types: Standard/Trauma Mattress  Skin Preventative Measures: Pillows in Use for Support / Positioning  Moisture Interventions  Moisturizers/Barriers: Barrier Wipes      Pain  Pain Rating Scale  0 - No Pain  Pain Location  Neck  Pain Location Orientation     Pain Interventions   Declines    ADLs    Bathing   Shower, * * With Assistance from, Self Care, No Assistance Required  Linen Change      Personal Hygiene  Moist Preeti Wipes  Chlorhexidine Bath      Oral Care  Brushed Teeth  Teeth/Dentures     Shave     Nutrition Percentage Eaten  Between % Consumed  Environmental Precautions  Treaded Slipper Socks on Patient, Bed in Low Position  Patient Turns/Positioning  Patient Turns Self from Side to Side  Patient Turns Assistance/Tolerance     Bed Positions  Bed Controls On, Bed Locked  Head of Bed Elevated  Self regulated      Psychosocial/Neurologic Assessment  Psychosocial Assessment  Psychosocial (WDL):  Within Defined Limits  Neurologic Assessment  Neuro (WDL): Exceptions to WDL  Level of Consciousness: Alert  Orientation Level: Oriented X4  Cognition: Appropriate judgement, Appropriate safety awareness, Appropriate attention/concentration, Appropriate for developmental age, Follows commands  Speech: Clear  Pupil Assesment: No  Motor Function/Sensation Assessment: Dorsiflexion, Motor response, Sensation, Motor strength  R Foot Dorsiflexion: Weak  L Foot Dorsiflexion: Strong  RUE Motor Response: Responds to commands  RUE Sensation: Full sensation  Muscle Strength Right Arm: Good Strength Against Gravity and Moderate Resistance  LUE Motor Response: Tremors, Responds to commands  LUE Sensation: Full sensation  Muscle Strength Left Arm: Normal Strength Against Gravity and Full Resistance  RLE Motor Response: Responds to commands  RLE Sensation: Numbness  Muscle Strength Right Leg: Weak Movement but Not Against Gravity or Resistance  LLE Motor  Response: Responds to commands  LLE Sensation: Full sensation  Muscle Strength Left Leg: Normal Strength Against Gravity and Full Resistance  EENT (WDL):  WDL Except    Cardio/Pulmonary Assessment  Edema      Respiratory Breath Sounds  RUL Breath Sounds: Clear  RML Breath Sounds: Clear  RLL Breath Sounds: Clear  MONIKA Breath Sounds: Clear  LLL Breath Sounds: Clear  Cardiac Assessment   Cardiac (WDL):  WDL Except (H/O HTN.)

## 2023-08-15 NOTE — PROGRESS NOTES
"Rehab Progress Note     Date of Service: 8/15/2023  Chief Complaint: Follow-up stroke    Interval Events (Subjective)    Patient seen and examined today in her room.  She continues to report improvement in the strength in her right leg.  She moved her bowels this morning.  She denies any pain.  She is sleeping well.    Patient has no other new questions, concerns, or complaints today.         Objective:  VITAL SIGNS: /73   Pulse 70   Temp 36.6 °C (97.9 °F) (Oral)   Resp 15   Ht 1.702 m (5' 7.01\")   Wt 64 kg (141 lb 1.5 oz)   SpO2 90%   BMI 22.09 kg/m²   Gen: alert, no apparent distress  CV: Regular rate, regular rhythm  Resp: Clear to auscultation bilaterally  Neuro: 4/5 MMT of the right upper and lower extremity, intermittent resting tremor of the left hand    No results found for this or any previous visit (from the past 72 hour(s)).        Scheduled Medications   Medication Dose Frequency    losartan  50 mg Q DAY    senna-docusate  2 Tablet BID    omeprazole  20 mg QAM AC    apixaban  5 mg BID    atorvastatin  80 mg Q EVENING    lamoTRIgine  150 mg Q EVENING       Current Diet Order   Procedures    Diet Order Diet: Regular; Tray Modifications (optional): SLP - Deliver to Nursing Station       Assessment:    This patient is a 67 y.o. female admitted for acute inpatient rehabilitation with Ischemic stroke (HCC).      I, Leona Pleitez M.D./MD., was present and led the interdisciplinary team conference on 8/9/2023.  I led the IDT conference and agree with the IDT conference documentation and plan of care as noted below.     Nursing:  Diet Current Diet Order   Procedures    Diet Order Diet: Regular; Tray Modifications (optional): SLP - Deliver to Nursing Station       Eating ADL Supervision  Increased time   % of Last Meal  Oral Nutrition: Lunch, Between 50-75% Consumed (75%)   Sleep No issues   Bowel Last BM: 08/07/23   Bladder Post Void  13   Barriers to Discharge Home: No issues      Physical " Therapy:  Bed Mobility    Transfers Moderate Assist (up to max A when pt fatigued after amb)  Adaptive equipment, Increased time, Initial preparation for task, Set-up of equipment, Supervision for safety, Verbal cueing (stand step FWW, max verbal cues for sequencing especially foot placement)   Mobility Moderate Assist (min A when walking in straight line, mod A when turning to remain upright)   Stairs Unable to Participate   Barriers to Discharge Home: Right hemiparesis, lives alone      Occupational Therapy:  Grooming Supervision, Seated   Bathing Minimal Assist (standing balance/safety when washing buttocks in standing, completed remainder of bathing tasks while seated on fold down shower bench)   UB Dressing Minimal Assist (min A for bra, set-up for t-shirt)   LB Dressing Minimal Assist (to don/doff scrub bottoms while incorporating sitting and standing w/ FWW)   Toileting Moderate Assist   Shower & Tub Transfer Minimal Assist (Min to CGA for wc <> fold down shower bench (stand pivot w/ GB))   Barriers to Discharge Home: Right hemiparesis, impaired problem-solving      Speech-Language Pathology:  To assess patient    Respiratory Therapy:  O2 (LPM): 0  O2 Delivery Device: None - Room Air    Case Management:  Continues to work on disposition and DME needs.      Discharge Date/Disposition:    8/22    HH: versus outpatient    Equip: TBD    Follow-ups: PCP, stroke bridge, cardiology, neurology       Problem List/Medical Decision Making and Plan:      L CAROL ANN stroke  Right hemiparesis, improved  Right neglect, improved  PT/OT, 1.5 hr each discipline, 5 days per week  Access for cognitive impairment, speech therapy ordered    Continue Zio patch, return in 2 weeks on 8/18    Continue Eliquis and atorvastatin    Outpatient follow-up with stroke Bridge clinic, cardiology, referrals made    Right spasticity  Currently very mild and only at night  Continue to monitor    Seizure disorder  Last seizure November  2022  Generalized tonic-clonic  Outpatient follow-up with a provider in [place]  Continue lamotrigine     Tremor, left-sided  History of right MCA stroke  Outpatient referral to movement disorder as needed     Hypertension, improved  Continue losartan  Continue hydralazine as needed, not requiring    Rash, resolved  Looks like rosacea  Given patient handout information  Outpatient follow-up with dermatology as needed     Hyperlipidemia  Continue atorvastatin    GI prophylaxis  Continue omeprazole while on Eliquis    DVT prophylaxis  Continue Eliquis    Leona Pleitez M.D.  Physical Medicine and Rehabilitation

## 2023-08-15 NOTE — THERAPY
Physical Therapy   Daily Treatment     Patient Name: Doreen Noe  Age:  67 y.o., Sex:  female  Medical Record #: [id number]  Today's Date: 8/15/2023     Precautions  Precautions: Fall Risk  Comments: R hemiparesis, mild R neglect, hx of seizures and L side CVA, Zio patch    Subjective    Pt received up in , pleasant and agreeable to participate.      Objective       08/15/23 1101   PT Charge Group   PT Gait Training (Units) 1   PT Therapeutic Activities (Units) 1   PT Total Time Spent   PT Individual Total Time Spent (Mins) 30   Gait Functional Level of Assist    Gait Level Of Assist Contact Guard Assist   Assistive Device Single Point Cane   Distance (Feet) 100   # of Times Distance was Traveled 1  (plus 80 ft)   Deviation Decreased Base Of Support;Bradykinetic;Decreased Heel Strike;Decreased Toe Off  (intermittent cross over stepping with CG to maintain balance, cues for RUE arm swing, good 2 point step through sequencing with SPC)   Transfer Functional Level of Assist   Bed, Chair, Wheelchair Transfer Contact Guard Assist   Bed Chair Wheelchair Transfer Description Adaptive equipment;Increased time;Initial preparation for task;Supervision for safety;Verbal cueing  (stand step SPC)   Bed Mobility    Sit to Stand Minimal Assist  (min A-CGA to SPC for steadying d/t tendency for retropulsion and R sided LOB)   Interdisciplinary Plan of Care Collaboration   Patient Position at End of Therapy Seated;Chair Alarm On;Other (Comments)  (in cafeteria for lunch)     Floor recovery: Performed in full x2 times at EOM using supine>side sitting>quadruped>tall kneeling>half kneeling c/ LLE leading>stand pivot to sit progression. Required min A fading to CGA by 2nd bout for side sitting>tall kneeling transition using RUE to push trunk upright.    Assessment    Pt tolerated session well focused on continued gait training with SPC and initiation of floor recovery. Demo'ed good 2 point sequencing with SPC but still requires CGA  for steadying d/t tendency for posterior and R sided imbalance.    Strengths: Able to follow instructions, Effective communication skills, Good insight into deficits/needs, Independent prior level of function, Pleasant and cooperative, Motivated for self care and independence  Barriers: Decreased endurance, Fatigue, Generalized weakness, Hemiparesis, Home accessibility, Impaired activity tolerance, Impaired balance, Spasticity    Plan    Gait training with FWW vs SPC, continue treadmill training vs R Bioness gait training, stairs (lives in [facility] 2SH 5 DENZEL R[facility]), reactive standing balance, sit<>stands with emphasis on sequencing/safety/avoiding retropulsion, mat program, RLE and trunk NRE, RLE forced use, txfer training SPC, bed mobility, R sided awareness, floor recovery. RT-300 RLE adjunct c/ techs 2-3x/wk as able. Vector profile in [facility].      Passport items to be completed:  Get in/out of bed safely, in/out of a vehicle, safely use mobility device, walk or wheel around home/community, navigate up and down stairs, show how to get up/down from the ground, ensure home is accessible, demonstrate HEP, complete caregiver training    Physical Therapy Problems (Active)       Problem: Mobility       Dates: Start:  08/05/23         Goal: STG-Within one week, patient will ascend and descend four to six stairs with MOD A and B handrails.        Dates: Start:  08/05/23         Goal Note filed on 08/09/23 1323 by Jonelle Tabares, PT       Not yet assessed                 Problem: Mobility Transfers       Dates: Start:  08/05/23         Goal: STG-Within one week, patient will perform bed mobility including rolling L/R and supine<>sit with MIN A.        Dates: Start:  08/05/23         Goal Note filed on 08/09/23 1323 by Jonelle Tabares, PT       Needs to be reassessed given recent functional progress              Goal: STG-Within one week, patient will transfer bed to chair with FWW and MIN A.        Dates: Start:  08/05/23          Goal Note filed on 08/09/23 1323 by Jonelle Tabares, PT       Mod-max A c/ FWW                 Problem: PT-Long Term Goals       Dates: Start:  08/05/23         Goal: LTG-By discharge, patient will ambulate 75' with FWW and SBA       Dates: Start:  08/05/23            Goal: LTG-By discharge, patient will transfer one surface to another with FWW independently.        Dates: Start:  08/05/23            Goal: LTG-By discharge, patient will ambulate up/down 4-6 stairs with B handrails and SBA       Dates: Start:  08/05/23            Goal: LTG-By discharge, patient will transfer in/out of a car with FWW and supervision.        Dates: Start:  08/05/23

## 2023-08-15 NOTE — THERAPY
Speech Language Pathology  Daily Treatment     Patient Name: Doreen Noe  Age:  67 y.o., Sex:  female  Medical Record #: [id number]  Today's Date: 8/15/2023     Precautions  Precautions: Fall Risk  Comments: R hemiparesis, mild R neglect, hx of seizures and L side CVA, Zio patch    Subjective    Patient agreeable to therapy.     Objective       08/15/23 0931   Treatment Charges   SLP Cognitive Skill Development First 15 Minutes 1   SLP Cognitive Skill Development Additional 15 Minutes 1   SLP Total Time Spent   SLP Individual Total Time Spent (Mins) 30   Cognition   Functional Memory Activities Within Functional Limits (6-7)   Medication Management  Within Functional Limits (6-7)         Assessment    Patient recalled errors on previous med sort task attempt.  Able to complete a med sort today with 100% acc.    Strengths: Able to follow instructions, Alert and oriented, Effective communication skills, Independent prior level of function, Manages pain appropriately, Pleasant and cooperative, Supportive family, Motivated for self care and independence, Willingly participates in therapeutic activities (chronic tremors, some visual impairment from cataracts per patient report)  Barriers: Hemiparesis    Plan    Target recall of safety sequencing.    Passport items to be completed:  Express basic needs, understand food/liquid recommendations, consistently follow swallow precautions, manage finances, manage medications, arrive to therapy appointments on time, complete daily memory log entries, solve problems related to safety situations, review education related to hospitalization, complete caregiver training     Speech Therapy Problems (Active)       Problem: Memory STGs       Dates: Start:  08/10/23         Goal: STG-Within one week, patient will use compensatory memory strategies and external memory aids to recall daily events and safety sequencing with 80% acc with min cues.       Dates: Start:  08/10/23             Goal: STG-Within one week, patient will perform medication management tasks with 90% acc or better with task set up.       Dates: Start:  08/10/23               Problem: Speech/Swallowing LTGs       Dates: Start:  08/10/23         Goal: LTG-By discharge, patient will solve complex problem and recall new training for safety with 90% acc mod I for safe discharge home.       Dates: Start:  08/10/23

## 2023-08-15 NOTE — CARE PLAN
The patient is Stable - Low risk of patient condition declining or worsening    Shift Goals  Clinical Goals: Safety  Patient Goals: Sleep well  Family Goals: Education    Progress made toward(s) clinical / shift goals:  Pt's VSS, RV, pills whole, denies pain, able to participate in therapy.

## 2023-08-15 NOTE — CARE PLAN
"The patient is Stable - Low risk of patient condition declining or worsening    Shift Goals  Clinical Goals: Safety  Patient Goals: Sleep well  Family Goals: Education    Progress made toward(s) clinical / shift goals:    Problem: Pain - Standard  Goal: Alleviation of pain or a reduction in pain to the patient’s comfort goal  Outcome: Progressing  Note: Patient able to verbalize pain level and verbalize an acceptable level of pain.      Problem: Fall Risk - Rehab  Goal: Patient will remain free from falls  Note: Mariela Rodgers Fall risk Assessment Score: 16    High fall risk Interventions   - Alarming seatbelt  - Bed and strip alarm   - Yellow sign by the door   - Yellow wrist band \"Fall risk\"  - Room near to the nurse station  - Do not leave patient unattended in the bathroom  - Fall risk education provided               "

## 2023-08-15 NOTE — CARE PLAN
Problem: Self Care  Goal: Patient will have the ability to perform ADLs independently or with assistance  Outcome: Progressing  Note: Patient able to perform regular activities this shift.  Pain controlled this shift.  Pain management includes PRN pain meds as well as non-pharmacological measures such as emotional support, rest, and repositioning.  Will continue to monitor.    The patient is Stable - Low risk of patient condition declining or worsening    Shift Goals  Clinical Goals: Safety  Patient Goals: Sleep well  Family Goals: Education    Progress made toward(s) clinical / shift goals:  pt participates with therapy and is cooperative with nursing    Patient is not progressing towards the following goals:

## 2023-08-15 NOTE — THERAPY
Occupational Therapy  Daily Treatment     Patient Name: Doreen Noe  Age:  67 y.o., Sex:  female  Medical Record #: [id number]  Today's Date: 8/15/2023     Precautions  Precautions: (P) Fall Risk  Comments: (P) R hemiparesis, mild R neglect, hx of seizures and L side CVA, Zio patch         Subjective    Pt seated in w/c upon arrival. Pt pleasant and cooperative, agreeable to therapy. SO present at start of session.     Objective       08/15/23 1501   OT Total Time Spent   OT Individual Total Time Spent (Mins) 30   Precautions   Precautions Fall Risk   Comments R hemiparesis, mild R neglect, hx of seizures and L side CVA, Zio patch   Vitals   O2 Delivery Device None - Room Air   Pain   Intervention Declines   Cognition    Level of Consciousness Alert   Sleep/Wake Cycle   Sleep & Rest Awake;Out of bed   Functional Level of Assist   Toileting Standby Assist   Toileting Description Grab bar;Increased time;Set-up of equipment;Supervision for safety   Bed, Chair, Wheelchair Transfer Contact Guard Assist   Bed Chair Wheelchair Transfer Description Adaptive equipment;Increased time;Set-up of equipment;Supervision for safety  (stand pivot from w/c to bed)   Toilet Transfers Contact Guard Assist   Toilet Transfer Description Grab bar;Increased time;Set-up of equipment;Supervision for safety  (stand pivot from w/c <> toilet)   Bed Mobility    Sit to Supine Standby Assist   Interdisciplinary Plan of Care Collaboration   Patient Position at End of Therapy In Bed;Bed Alarm On;Call Light within Reach;Tray Table within Reach;Phone within Reach     Standing tolerance- pt completed ring toss standing in front of mat table on blue foam pad with CGA. Pt used RUE to reach far for rings on R and L side. LUE was behind back. Pt completed two rounds with no rest breaks.     Assessment    Pt tolerated standing tolerance activity well. Pt was successful in reaching to both sides without needing to use the LUE for support on the mat table.  "Pt required one verbal cue to widen base of support when standing on foam pad. Pt had 1 posterior LOB into w/c when trying to sit back in w/c following activity. Pt stated she was, \"in a rush\" when trying to sit.        Plan    Continue increasing overall strength and endurance. ADL/IADL training.    Passport items to be completed:  Perform bathroom transfers, complete dressing, complete feeding, get ready for the day, prepare a simple meal, participate in household tasks, adapt home for safety needs, demonstrate home exercise program, complete caregiver training     Occupational Therapy Goals (Active)       Problem: Dressing       Dates: Start:  08/05/23         Goal: STG-Within one week, patient will dress UB at a Mod I level.        Dates: Start:  08/05/23         Goal Note filed on 08/09/23 1437 by Vidhi Mott MS,OTR/L       Requires Min A              Goal: STG-Within one week, patient will dress LB at a Min A level with AE/AD PRN.        Dates: Start:  08/05/23         Goal Note filed on 08/09/23 1437 by Vidhi Mott MS,OTR/L       Inconsistent                  Problem: Functional Transfers       Dates: Start:  08/05/23         Goal: STG-Within one week, patient will transfer to toilet at a CGA level with AE/AD PRN.        Dates: Start:  08/05/23         Goal Note filed on 08/09/23 1437 by Vidhi Mott MS,OTR/L       Requires Min A              Goal: STG-Within one week, patient will transfer to tub/shower at a CGA level with AE/AD/DME PRN.        Dates: Start:  08/05/23         Goal Note filed on 08/09/23 1437 by Vidhi Mott MS,OTR/L       Requires Min A                 Problem: OT Long Term Goals       Dates: Start:  08/05/23         Goal: LTG-By discharge, patient will complete basic self care tasks at a SUP/Mod I level with AE/AD/DME PRN.        Dates: Start:  08/05/23            Goal: LTG-By discharge, patient will perform bathroom transfers at a SUP/Mod I level with " AE/AD/DME PRN.        Dates: Start:  08/05/23            Goal: LTG-By discharge, patient will complete basic home management at a Min A to Mod I level with LRD and AE PRN.        Dates: Start:  08/05/23               Problem: Toileting       Dates: Start:  08/05/23         Goal: STG-Within one week, patient will complete toileting tasks at a CGA level with AE/AD/DME PRN.        Dates: Start:  08/05/23         Goal Note filed on 08/09/23 8377 by Vidhi Mott MS,OTR/L       Requires Mod A

## 2023-08-16 ENCOUNTER — APPOINTMENT (OUTPATIENT)
Dept: PHYSICAL THERAPY | Facility: REHABILITATION | Age: 68
DRG: 057 | End: 2023-08-16
Attending: PHYSICAL MEDICINE & REHABILITATION
Payer: COMMERCIAL

## 2023-08-16 ENCOUNTER — APPOINTMENT (OUTPATIENT)
Dept: OCCUPATIONAL THERAPY | Facility: REHABILITATION | Age: 68
DRG: 057 | End: 2023-08-16
Attending: PHYSICAL MEDICINE & REHABILITATION
Payer: COMMERCIAL

## 2023-08-16 ENCOUNTER — APPOINTMENT (OUTPATIENT)
Dept: SPEECH THERAPY | Facility: REHABILITATION | Age: 68
DRG: 057 | End: 2023-08-16
Attending: PHYSICAL MEDICINE & REHABILITATION
Payer: COMMERCIAL

## 2023-08-16 PROCEDURE — 97530 THERAPEUTIC ACTIVITIES: CPT

## 2023-08-16 PROCEDURE — 700102 HCHG RX REV CODE 250 W/ 637 OVERRIDE(OP): Performed by: PHYSICAL MEDICINE & REHABILITATION

## 2023-08-16 PROCEDURE — 97110 THERAPEUTIC EXERCISES: CPT

## 2023-08-16 PROCEDURE — 97129 THER IVNTJ 1ST 15 MIN: CPT

## 2023-08-16 PROCEDURE — 97130 THER IVNTJ EA ADDL 15 MIN: CPT

## 2023-08-16 PROCEDURE — 97116 GAIT TRAINING THERAPY: CPT

## 2023-08-16 PROCEDURE — A9270 NON-COVERED ITEM OR SERVICE: HCPCS | Performed by: PHYSICAL MEDICINE & REHABILITATION

## 2023-08-16 PROCEDURE — 97112 NEUROMUSCULAR REEDUCATION: CPT

## 2023-08-16 PROCEDURE — 99232 SBSQ HOSP IP/OBS MODERATE 35: CPT | Performed by: PHYSICAL MEDICINE & REHABILITATION

## 2023-08-16 PROCEDURE — 770010 HCHG ROOM/CARE - REHAB SEMI PRIVAT*

## 2023-08-16 RX ADMIN — SENNOSIDES AND DOCUSATE SODIUM 2 TABLET: 50; 8.6 TABLET ORAL at 20:40

## 2023-08-16 RX ADMIN — LOSARTAN POTASSIUM 50 MG: 50 TABLET, FILM COATED ORAL at 05:18

## 2023-08-16 RX ADMIN — APIXABAN 5 MG: 5 TABLET, FILM COATED ORAL at 08:13

## 2023-08-16 RX ADMIN — OMEPRAZOLE 20 MG: 20 CAPSULE, DELAYED RELEASE ORAL at 08:13

## 2023-08-16 RX ADMIN — ATORVASTATIN CALCIUM 80 MG: 40 TABLET, FILM COATED ORAL at 20:40

## 2023-08-16 RX ADMIN — APIXABAN 5 MG: 5 TABLET, FILM COATED ORAL at 20:40

## 2023-08-16 RX ADMIN — LAMOTRIGINE 150 MG: 100 TABLET ORAL at 20:40

## 2023-08-16 ASSESSMENT — ACTIVITIES OF DAILY LIVING (ADL)
BED_CHAIR_WHEELCHAIR_TRANSFER_DESCRIPTION: ADAPTIVE EQUIPMENT;INCREASED TIME;SUPERVISION FOR SAFETY;VERBAL CUEING
TUB_SHOWER_TRANSFER_DESCRIPTION: GRAB BAR;SHOWER BENCH;INCREASED TIME;INITIAL PREPARATION FOR TASK;SET-UP OF EQUIPMENT;SUPERVISION FOR SAFETY;VERBAL CUEING

## 2023-08-16 ASSESSMENT — GAIT ASSESSMENTS
DISTANCE (FEET): 200
GAIT LEVEL OF ASSIST: CONTACT GUARD ASSIST
GAIT LEVEL OF ASSIST: CONTACT GUARD ASSIST
DISTANCE (FEET): 380
ASSISTIVE DEVICE: SINGLE POINT CANE
DEVIATION: DECREASED BASE OF SUPPORT;BRADYKINETIC;DECREASED HEEL STRIKE;DECREASED TOE OFF
ASSISTIVE DEVICE: SINGLE POINT CANE

## 2023-08-16 NOTE — THERAPY
"Physical Therapy   Daily Treatment     Patient Name: Doreen Noe  Age:  67 y.o., Sex:  female  Medical Record #: [id number]  Today's Date: 8/16/2023     Precautions  Precautions: Fall Risk  Comments: R hemiparesis, mild R neglect, hx of seizures and L side CVA, Zio patch    Subjective    Patient reports she wants to work on walking     Objective       08/16/23 0831   PT Charge Group   PT Gait Training (Units) 2   PT Therapeutic Exercise (Units) 1   PT Therapeutic Activities (Units) 1   PT Total Time Spent   PT Individual Total Time Spent (Mins) 60   Precautions   Precautions Fall Risk   Comments R hemiparesis, mild R neglect, hx of seizures and L side CVA, Zio patch   Gait Functional Level of Assist    Gait Level Of Assist Contact Guard Assist   Assistive Device Single Point Cane   Distance (Feet) 200  (100ft, 200ft indoors CGA, 100ft x 2 outdoors CGA)   Deviation   (intermittent scissoring; occasional right foot \"scuffing\" with fatigue, patient able to feel and correct right foot drag)   Stairs Functional Level of Assist   Level of Assist with Stairs Standby Assist   # of Stairs Climbed 8   Stairs Description Hand rails  (step to patterning)       Assessment    Patient requires close CGA with outdoor ambulation with SPC as outdoor terrain increases right toe drag frequency and patient had to slow forward momentum down for self corrections    Strengths: Able to follow instructions, Effective communication skills, Good insight into deficits/needs, Independent prior level of function, Pleasant and cooperative, Motivated for self care and independence  Barriers: Decreased endurance, Fatigue, Generalized weakness, Hemiparesis, Home accessibility, Impaired activity tolerance, Impaired balance, Spasticity    Plan    Gait training with FWW vs SPC, continue treadmill training vs R Bioness gait training, stairs (lives in [facility] [address] [facility]), reactive standing balance, sit<>stands with emphasis on " sequencing/safety/avoiding retropulsion, mat program, RLE and trunk NRE, RLE forced use, txfer training FWW, bed mobility, R sided awareness, floor recovery when appropriate. RT-300 RLE adjunct c/ techs 2-3x/wk as able. Vector profile in [facility].      Passport items to be completed:  Get in/out of bed safely, in/out of a vehicle, safely use mobility device, walk or wheel around home/community, navigate up and down stairs, show how to get up/down from the ground, ensure home is accessible, demonstrate HEP, complete caregiver training    Physical Therapy Problems (Active)       Problem: PT-Long Term Goals       Dates: Start:  08/05/23         Goal: LTG-By discharge, patient will ambulate 75' with FWW and SBA       Dates: Start:  08/05/23            Goal: LTG-By discharge, patient will transfer one surface to another with FWW independently.        Dates: Start:  08/05/23            Goal: LTG-By discharge, patient will ambulate up/down 4-6 stairs with B handrails and SBA       Dates: Start:  08/05/23            Goal: LTG-By discharge, patient will transfer in/out of a car with FWW and supervision.        Dates: Start:  08/05/23

## 2023-08-16 NOTE — CARE PLAN
Problem: Memory STGs  Goal: STG-Within one week, patient will use compensatory memory strategies and external memory aids to recall daily events and safety sequencing with 80% acc with min cues.  Outcome: Met  Note: 80% with min A.  Goal: STG-Within one week, patient will perform medication management tasks with 90% acc or better with task set up.  Outcome: Met  Note: 100% acc.

## 2023-08-16 NOTE — PROGRESS NOTES
Received bedside shift report from Sophie TREVIÑO RN regarding patient and assumed care. Patient awake, calm and stable, currently positioned in bed for comfort and safety; call light within reach. Denies pain or discomfort at this time. Will continue to monitor.

## 2023-08-16 NOTE — CARE PLAN
Problem: Skin Integrity  Goal: Patient's skin integrity will be maintained or improve  Outcome: Progressing  Note: Patient's skin remains intact and free from new or accidental injury this shift.  Will continue to monitor.    The patient is Stable - Low risk of patient condition declining or worsening    Shift Goals  Clinical Goals: Safety  Patient Goals: Sleep well  Family Goals: Education    Progress made toward(s) clinical / shift goals:  pt skin intact, no injury or breakdown    Patient is not progressing towards the following goals:

## 2023-08-16 NOTE — DISCHARGE PLANNING
"Case Management/IDT follow up.   IDT continues to recommend IRF level of care as patient continue to make progress with all therapies.   dc date set for 8/22    Recommendations made for out pt PT/OT - choice is [facility]/ [facility] \" locaton; follow up with pcp and neurology a single point cane will be ordered.  Met with pt  providing update from IDT and discussed plan of care. She reports her brother will be able to stay w/ for a few days if needed.  Pt plans on getting [employer]; provided her w/ info on applying for [employer].     Plan:  8/16/2023   "

## 2023-08-16 NOTE — THERAPY
Occupational Therapy  Daily Treatment     Patient Name: Doreen Noe  Age:  67 y.o., Sex:  female  Medical Record #: [id number]  Today's Date: 8/16/2023     Precautions  Precautions: Fall Risk  Comments: R hemiparesis, mild R neglect, hx of seizures and L side CVA, Zio patch         Subjective    Pt up in gym, ready for OT session.      Objective     08/16/23 0931   OT Charge Group   OT Therapy Activity (Units) 2   OT Total Time Spent   OT Individual Total Time Spent (Mins) 30   Fine Motor / Dexterity    Fine Motor / Dexterity Yes   Fine Motor / Dexterity Interventions Pt completed Purdue Pegboard initially with R pinch and then progressed to using tweezers to pick pieces up; resistive clothespin tower placing all colors with a 3-point pinch   Interdisciplinary Plan of Care Collaboration   IDT Collaboration with  Physician   Patient Position at End of Therapy Seated;Chair Alarm On;Self Releasing Lap Belt Applied;Call Light within Reach;Tray Table within Reach;Phone within Reach   Collaboration Comments Dr. Pleitez rounds       Assessment    Pt tolerated OT session well and demo's good motor control with C activities. Able to use tweezers to  each small piece on the Purdue Pegboard with only a few dropped pieces.        Plan    Refer to primary OT POC    Passport items to be completed:  Perform bathroom transfers, complete dressing, complete feeding, get ready for the day, prepare a simple meal, participate in household tasks, adapt home for safety needs, demonstrate home exercise program, complete caregiver training     Occupational Therapy Goals (Active)       Problem: Dressing       Dates: Start:  08/05/23         Goal: STG-Within one week, patient will dress UB at a Mod I level.        Dates: Start:  08/05/23         Goal Note filed on 08/09/23 1437 by Vidhi Mott MS,OTR/L       Requires Min A              Goal: STG-Within one week, patient will dress LB at a Min A level with AE/AD PRN.         Dates: Start:  08/05/23         Goal Note filed on 08/09/23 1437 by Vidhi Mott, MS,OTR/L       Inconsistent                  Problem: Functional Transfers       Dates: Start:  08/05/23         Goal: STG-Within one week, patient will transfer to toilet at a CGA level with AE/AD PRN.        Dates: Start:  08/05/23         Goal Note filed on 08/09/23 1437 by Vidhi Mott MS,OTR/L       Requires Min A              Goal: STG-Within one week, patient will transfer to tub/shower at a CGA level with AE/AD/DME PRN.        Dates: Start:  08/05/23         Goal Note filed on 08/09/23 1437 by Vidhi Mott MS,OTR/L       Requires Min A                 Problem: OT Long Term Goals       Dates: Start:  08/05/23         Goal: LTG-By discharge, patient will complete basic self care tasks at a SUP/Mod I level with AE/AD/DME PRN.        Dates: Start:  08/05/23            Goal: LTG-By discharge, patient will perform bathroom transfers at a SUP/Mod I level with AE/AD/DME PRN.        Dates: Start:  08/05/23            Goal: LTG-By discharge, patient will complete basic home management at a Min A to Mod I level with LRD and AE PRN.        Dates: Start:  08/05/23               Problem: Toileting       Dates: Start:  08/05/23         Goal: STG-Within one week, patient will complete toileting tasks at a CGA level with AE/AD/DME PRN.        Dates: Start:  08/05/23         Goal Note filed on 08/09/23 1437 by Vidhi Mott MS,OTR/L       Requires Mod A

## 2023-08-16 NOTE — CARE PLAN
Problem: Dressing  Goal: STG-Within one week, patient will dress UB at a Mod I level.   Outcome: Not Met  Note: SPV     Problem: Dressing  Goal: STG-Within one week, patient will dress LB at a Min A level with AE/AD PRN.   Outcome: Met     Problem: Toileting  Goal: STG-Within one week, patient will complete toileting tasks at a CGA level with AE/AD/DME PRN.   Outcome: Met     Problem: Functional Transfers  Goal: STG-Within one week, patient will transfer to toilet at a CGA level with AE/AD PRN.   Outcome: Met  Goal: STG-Within one week, patient will transfer to tub/shower at a CGA level with AE/AD/DME PRN.   Outcome: Met

## 2023-08-16 NOTE — CARE PLAN
Problem: Mobility  Goal: STG-Within one week, patient will ascend and descend four to six stairs with MOD A and B handrails.   Outcome: Met     Problem: Mobility Transfers  Goal: STG-Within one week, patient will perform bed mobility including rolling L/R and supine<>sit with MIN A.   Outcome: Met  Goal: STG-Within one week, patient will transfer bed to chair with FWW and MIN A.   Outcome: Met

## 2023-08-16 NOTE — PROGRESS NOTES
NURSING DAILY NOTE    Name: Doreen Noe   Date of Admission: 8/4/2023   Admitting Diagnosis: Ischemic stroke (HCC)  Attending Physician: Leona Pleitez M.d.  Allergies: Patient has no known allergies.    Safety  Patient Assist  cga-min  Patient Precautions  Fall Risk  Precaution Comments  R hemiparesis, mild R neglect, hx of seizures and L side CVA, Zio patch  Bed Transfer Status  Contact Guard Assist  Toilet Transfer Status   Contact Guard Assist  Assistive Devices  Wheelchair  Oxygen  None - Room Air  Diet/Therapeutic Dining  Current Diet Order   Procedures    Diet Order Diet: Regular; Tray Modifications (optional): SLP - Deliver to Nursing Station     Pill Administration  whole  Agitated Behavioral Scale     ABS Level of Severity       Fall Risk  Has the patient had a fall this admission?   No  Mariela Rodgers Fall Risk Scoring  16, HIGH RISK  Fall Risk Safety Measures  bed alarm, chair alarm, and poor balance    Vitals  Temperature: 36.6 °C (97.8 °F)  Temp src: Oral  Pulse: 93  Respiration: 18  Blood Pressure : 118/74  Blood Pressure MAP (Calculated): 89 MM HG  BP Location: Right, Upper Arm  Patient BP Position: Sitting     Oxygen  Pulse Oximetry: 93 %  O2 (LPM): 0  O2 Delivery Device: None - Room Air    Bowel and Bladder  Last Bowel Movement  08/15/23  Stool Type  Type 4: Like a sausage or snake, smooth and soft  Bowel Device  Bathroom  Continent  Bladder: Continent void   Bowel: Continent movement  Bladder Function  Urine Void (mL):  (Moderate)  Number of Times Voided: 1  Urinary Options: Yes  Urine Color: Unable To Evaluate  Genitourinary Assessment   Bladder Assessment (WDL):  WDL Except  Engel Catheter: Not Applicable  Urine Color: Unable To Evaluate  Bladder Device: Bathroom  Bladder Scan: Post Void  $ Bladder Scan Results (mL): 13    Skin  Angus Score   18  Sensory Interventions   Bed Types: Standard/Trauma Mattress  Skin Preventative  Measures: Pillows in Use for Support / Positioning  Moisture Interventions  Moisturizers/Barriers: Barrier Wipes      Pain  Pain Rating Scale  0 - No Pain  Pain Location  Neck  Pain Location Orientation     Pain Interventions   Declines    ADLs    Bathing   Shower, * * With Assistance from, Self Care, No Assistance Required  Linen Change      Personal Hygiene  Moist Preeti Wipes  Chlorhexidine Bath      Oral Care  Brushed Teeth  Teeth/Dentures     Shave     Nutrition Percentage Eaten  Between % Consumed  Environmental Precautions  Treaded Slipper Socks on Patient, Personal Belongings, Wastebasket, Call Bell etc. in Easy Reach, Transferred to Stronger Side, Report Given to Other Health Care Providers Regarding Fall Risk, Bed in Low Position  Patient Turns/Positioning  Patient Turns Self from Side to Side  Patient Turns Assistance/Tolerance     Bed Positions  Bed Controls On  Head of Bed Elevated  Self regulated      Psychosocial/Neurologic Assessment  Psychosocial Assessment  Psychosocial (WDL):  Within Defined Limits  Neurologic Assessment  Neuro (WDL): Exceptions to WDL  Level of Consciousness: Alert  Orientation Level: Oriented X4  Cognition: Appropriate judgement, Appropriate safety awareness, Appropriate attention/concentration, Appropriate for developmental age, Follows commands  Speech: Clear  Pupil Assesment: No  Motor Function/Sensation Assessment: Dorsiflexion, Motor response, Sensation, Motor strength  R Foot Dorsiflexion: Weak  L Foot Dorsiflexion: Strong  RUE Motor Response: Responds to commands  RUE Sensation: Full sensation  Muscle Strength Right Arm: Good Strength Against Gravity and Moderate Resistance  LUE Motor Response: Tremors, Responds to commands  LUE Sensation: Full sensation  Muscle Strength Left Arm: Normal Strength Against Gravity and Full Resistance  RLE Motor Response: Responds to commands  RLE Sensation: Numbness  Muscle Strength Right Leg: Weak Movement but Not Against Gravity or  Resistance  LLE Motor Response: Responds to commands  LLE Sensation: Full sensation  Muscle Strength Left Leg: Normal Strength Against Gravity and Full Resistance  EENT (WDL):  WDL Except    Cardio/Pulmonary Assessment  Edema      Respiratory Breath Sounds  RUL Breath Sounds: Clear  RML Breath Sounds: Clear  RLL Breath Sounds: Clear  MONIKA Breath Sounds: Clear  LLL Breath Sounds: Clear  Cardiac Assessment   Cardiac (WDL):  WDL Except (hx htn, hld)

## 2023-08-16 NOTE — PROGRESS NOTES
NURSING DAILY NOTE    Name: Doreen Noe   Date of Admission: 8/4/2023   Admitting Diagnosis: Ischemic stroke (HCC)  Attending Physician: Leona Pleitez M.d.  Allergies: Patient has no known allergies.    Safety  Patient Assist   (CGA/Min Assist)  Patient Precautions  Fall Risk  Precaution Comments  R hemiparesis, mild R neglect, hx of seizures and L side CVA, Zio patch  Bed Transfer Status  Contact Guard Assist  Toilet Transfer Status   Contact Guard Assist  Assistive Devices  Rails, Wheelchair  Oxygen  None - Room Air  Diet/Therapeutic Dining  Current Diet Order   Procedures    Diet Order Diet: Regular; Tray Modifications (optional): SLP - Deliver to Nursing Station     Pill Administration  whole  Agitated Behavioral Scale     ABS Level of Severity       Fall Risk  Has the patient had a fall this admission?   No  Mariela Rodgers Fall Risk Scoring  16, HIGH RISK  Fall Risk Safety Measures  bed alarm and Bed strip alarm.    Vitals  Temperature: 36.5 °C (97.7 °F)  Temp src: Oral  Pulse: 84  Respiration: 18  Blood Pressure : 126/80  Blood Pressure MAP (Calculated): 95 MM HG  BP Location: Right, Upper Arm  Patient BP Position: Supine     Oxygen  Pulse Oximetry: 92 %  O2 (LPM): 0  O2 Delivery Device: None - Room Air    Bowel and Bladder  Last Bowel Movement  08/15/23  Stool Type  Type 4: Like a sausage or snake, smooth and soft  Bowel Device  Bathroom, Other (Comment) (Bowel Meds)  Continent  Bladder: Continent void   Bowel: Continent movement  Bladder Function  Urine Void (mL):  (Moderate)  Number of Times Voided: 1  Urinary Options: Yes  Urine Color: Unable To Evaluate  Genitourinary Assessment   Bladder Assessment (WDL):  Within Defined Limits  Engel Catheter: Not Applicable  Urine Color: Unable To Evaluate  Bladder Device: Bathroom  Bladder Scan: Post Void  $ Bladder Scan Results (mL): 13    Skin  Angus Score   18  Sensory Interventions   Bed Types:  Standard/Trauma Mattress  Skin Preventative Measures: Pillows in Use for Support / Positioning  Moisture Interventions  Moisturizers/Barriers: Barrier Wipes      Pain  Pain Rating Scale  0 - No Pain  Pain Location  Neck  Pain Location Orientation     Pain Interventions   Declines    ADLs    Bathing   Shower, * * With Assistance from, Self Care, No Assistance Required  Linen Change      Personal Hygiene  Moist Preeti Wipes  Chlorhexidine Bath      Oral Care  Brushed Teeth  Teeth/Dentures     Shave     Nutrition Percentage Eaten  Dinner, Less than 25% Consumed  Environmental Precautions  Treaded Slipper Socks on Patient, Bed in Low Position  Patient Turns/Positioning  Patient Turns Self from Side to Side  Patient Turns Assistance/Tolerance     Bed Positions  Bed Controls On, Bed Locked  Head of Bed Elevated  Self regulated      Psychosocial/Neurologic Assessment  Psychosocial Assessment  Psychosocial (WDL):  Within Defined Limits  Neurologic Assessment  Neuro (WDL): Exceptions to WDL  Level of Consciousness: Alert  Orientation Level: Oriented X4  Cognition: Appropriate judgement, Appropriate safety awareness, Appropriate attention/concentration, Appropriate for developmental age, Follows commands  Speech: Clear  Pupil Assesment: No  Motor Function/Sensation Assessment: Dorsiflexion, Motor response, Sensation, Motor strength  R Foot Dorsiflexion: Weak  L Foot Dorsiflexion: Strong  RUE Motor Response: Responds to commands  RUE Sensation: Full sensation  Muscle Strength Right Arm: Good Strength Against Gravity and Moderate Resistance  LUE Motor Response: Tremors, Responds to commands  LUE Sensation: Full sensation  Muscle Strength Left Arm: Normal Strength Against Gravity and Full Resistance  RLE Motor Response: Responds to commands  RLE Sensation: Numbness  Muscle Strength Right Leg: Weak Movement but Not Against Gravity or Resistance  LLE Motor Response: Responds to commands  LLE Sensation: Full sensation  Muscle  Strength Left Leg: Normal Strength Against Gravity and Full Resistance  EENT (WDL):  WDL Except    Cardio/Pulmonary Assessment  Edema      Respiratory Breath Sounds  RUL Breath Sounds: Clear  RML Breath Sounds: Clear  RLL Breath Sounds: Clear  MONIKA Breath Sounds: Clear  LLL Breath Sounds: Clear  Cardiac Assessment   Cardiac (WDL):  WDL Except (H/O HTN.)

## 2023-08-16 NOTE — THERAPY
Physical Therapy   Daily Treatment     Patient Name: Doreen Noe  Age:  67 y.o., Sex:  female  Medical Record #: [id number]  Today's Date: 8/16/2023     Precautions  Precautions: Fall Risk  Comments: R hemiparesis, mild R neglect, hx of seizures and L side CVA, Zio patch    Subjective    Pt received up in  talking on the phone, pleasant and agreeable to participate.      Objective       08/16/23 1031   PT Charge Group   PT Gait Training (Units) 1   PT Therapeutic Exercise (Units) 1   PT Neuromuscular Re-Education / Balance (Units) 2   PT Total Time Spent   PT Individual Total Time Spent (Mins) 60   Gait Functional Level of Assist    Gait Level Of Assist Contact Guard Assist   Assistive Device Single Point Cane   Distance (Feet) 380   # of Times Distance was Traveled 1  (outdoors over grass, sidewalk, inclines/ramps; plus 180 ft and 100 ft indoors)   Deviation Decreased Base Of Support;Bradykinetic;Decreased Heel Strike;Decreased Toe Off   Transfer Functional Level of Assist   Bed, Chair, Wheelchair Transfer Contact Guard Assist   Bed Chair Wheelchair Transfer Description Adaptive equipment;Increased time;Supervision for safety;Verbal cueing  (stand step SPC)   Sitting Lower Body Exercises   Nustep Resistance Level 4  (x10 min at approx 50 spm, 0.25 mi)   Bed Mobility    Sit to Stand Minimal Assist  (min A-CGA to SPC for steadying d/t tendency for retropulsion and R sided LOB)   Neuro-Muscular Treatments   Neuro-Muscular Treatments Anterior weight shift;Biofeedback;Compensatory Strategies;Postural Facilitation;Sequencing;Tactile Cuing;Verbal Cuing;Weight Shift Right;Weight Shift Left   Comments Dynamic seated balance/core stability training plus dynamic gait training outdoors (see PT note for details)   Interdisciplinary Plan of Care Collaboration   Patient Position at End of Therapy Seated;Other (Comments)  (in cafeteria for lunch)     Dynamic seated balance/core stability training while seated on large  "Theraball c/ mirror feedback for postural awareness x20-25 min total:  Static sitting balance - therapist support faded from \"locking in\" ball to no physical support  Hip circles both directions  Seated marches - required assist to stabilize ball d/t posterior trunk lean  Wood chops c/ B hands clasped x10 both directions  OH press c/ B hands clasped x20  Chest press c/ B hands clasped x20  Required close SBA-mod A throughout training to stabilize ball and prevent rolling hips off ball laterally/anteriorly    With 2nd person assist to stabilize ball and assist pt into standing for safety.    Gait training outdoors: Incorporated horiz head turns when amb over flat sidewalk, pt able to take 2-3 steps while maintaining head turn before returning head to neutral.    Assessment    Pt tolerated session well focused on dynamic seated balance/core stability training and gait training. Cont to present with inconsistent sit<>stand ranging from SBA to min A d/t retropulsion. Would benefit from balance/gait training in busy environments to address impaired attention.    Strengths: Able to follow instructions, Effective communication skills, Good insight into deficits/needs, Independent prior level of function, Pleasant and cooperative, Motivated for self care and independence  Barriers: Decreased endurance, Fatigue, Generalized weakness, Hemiparesis, Home accessibility, Impaired activity tolerance, Impaired balance, Spasticity    Plan    Gait training with SPC - incorporate head turns/visual disturbances/busy environments for balance and attention training, sit<>stands with emphasis on sequencing/safety/avoiding retropulsion, continue treadmill training vs R Bioness gait training, stairs (lives in [facility]SH [facility]), reactive standing balance, mat program, RLE and trunk NRE, RLE forced use, txfer training SPC, bed mobility, R sided awareness, floor recovery. RT-300 RLE adjunct c/ techs 2-3x/wk as able. Vector profile in [place]. Aquatic therapy on Sun 8/20.     Passport items to be completed:  Get in/out of bed safely, in/out of a vehicle, safely use mobility device, walk or wheel around home/community, navigate up and down stairs, show how to get up/down from the ground, ensure home is accessible, demonstrate HEP, complete caregiver training    Physical Therapy Problems (Active)       Problem: PT-Long Term Goals       Dates: Start:  08/05/23         Goal: LTG-By discharge, patient will ambulate 75' with FWW and SBA       Dates: Start:  08/05/23            Goal: LTG-By discharge, patient will transfer one surface to another with FWW independently.        Dates: Start:  08/05/23            Goal: LTG-By discharge, patient will ambulate up/down 4-6 stairs with B handrails and SBA       Dates: Start:  08/05/23            Goal: LTG-By discharge, patient will transfer in/out of a car with FWW and supervision.        Dates: Start:  08/05/23

## 2023-08-16 NOTE — THERAPY
Speech Language Pathology  Daily Treatment     Patient Name: Doreen Noe  Age:  67 y.o., Sex:  female  Medical Record #: [id number]  Today's Date: 8/16/2023     Precautions  Precautions: Fall Risk  Comments: R hemiparesis, mild R neglect, hx of seizures and L side CVA, Zio patch    Subjective    Patient agreeable to therapy.     Objective       08/16/23 0731   Treatment Charges   SLP Cognitive Skill Development First 15 Minutes 1   SLP Cognitive Skill Development Additional 15 Minutes 1   SLP Total Time Spent   SLP Individual Total Time Spent (Mins) 30   Cognition   Prospective Memory Supervision (5)   Functional Level of Assist   Comprehension Modified Independent   Comprehension Description Glasses   Expression Independent   Social Interaction Independent   Problem Solving Modified Independent   Problem Solving Description Increased time;Supervision;Therapy schedule;Verbal cueing   Memory Supervision   Memory Description Increased time;Supervision;Therapy schedule;Verbal cueing         Assessment    Patient re-educated on use of memory log and memory strategies.  Advised patient to use memory log to record those skills she has not mastered yet for safe balance maintenance.  Patient was able to identify 3 skills she is currently working on and recorden in log.  Patient educated on use of memory strategies and was able to recall 3/5 unrelated words after 10 min delay increasing to 5/5 after 10 min delay.    Strengths: Able to follow instructions, Alert and oriented, Effective communication skills, Independent prior level of function, Manages pain appropriately, Pleasant and cooperative, Supportive family, Motivated for self care and independence, Willingly participates in therapeutic activities (chronic tremors, some visual impairment from cataracts per patient report)  Barriers: Hemiparesis    Plan    Target recall.    Passport items to be completed:  Express basic needs, understand food/liquid recommendations,  consistently follow swallow precautions, manage finances, manage medications, arrive to therapy appointments on time, complete daily memory log entries, solve problems related to safety situations, review education related to hospitalization, complete caregiver training     Speech Therapy Problems (Active)       Problem: Memory STGs       Dates: Start:  08/10/23         Goal: STG-Within one week, patient will use compensatory memory strategies and external memory aids to recall daily events and safety sequencing with 80% acc with min cues.       Dates: Start:  08/10/23            Goal: STG-Within one week, patient will perform medication management tasks with 90% acc or better with task set up.       Dates: Start:  08/10/23               Problem: Speech/Swallowing LTGs       Dates: Start:  08/10/23         Goal: LTG-By discharge, patient will solve complex problem and recall new training for safety with 90% acc mod I for safe discharge home.       Dates: Start:  08/10/23

## 2023-08-16 NOTE — PROGRESS NOTES
"Rehab Progress Note     Date of Service: 8/16/2023  Chief Complaint: Follow-up stroke    Interval Events (Subjective)    Patient seen and examined today in the therapy gym.  She is waiting to start occupational therapy.  She has been transition from a walker to a straight cane for ambulation.  She denies any pain.  She left and was her bowels yesterday.    Patient has no other new questions, concerns, or complaints today.           Objective:  VITAL SIGNS: BP (!) 148/78   Pulse 92   Temp 36.4 °C (97.6 °F) (Oral)   Resp 18   Ht 1.702 m (5' 7.01\")   Wt 64 kg (141 lb 1.5 oz)   SpO2 96%   BMI 22.09 kg/m²   Gen: alert, no apparent distress  CV: Regular rate, regular rhythm, cardiac monitor in left upper chest wall  Resp: Clear to auscultation bilaterally  Neuro: Improving right hemiparesis, chronic left upper extremity tremor/dyskinesias    No results found for this or any previous visit (from the past 72 hour(s)).        Scheduled Medications   Medication Dose Frequency    losartan  50 mg Q DAY    senna-docusate  2 Tablet BID    omeprazole  20 mg QAM AC    apixaban  5 mg BID    atorvastatin  80 mg Q EVENING    lamoTRIgine  150 mg Q EVENING       Current Diet Order   Procedures    Diet Order Diet: Regular; Tray Modifications (optional): SLP - Deliver to Nursing Station       Assessment:    This patient is a 67 y.o. female admitted for acute inpatient rehabilitation with Ischemic stroke (HCC).      I, Leona Pleitez M.D./MD., was present and led the interdisciplinary team conference on 8/16/2023.  I led the IDT conference and agree with the IDT conference documentation and plan of care as noted below.     Nursing:  Diet Current Diet Order   Procedures    Diet Order Diet: Regular; Tray Modifications (optional): SLP - Deliver to Nursing Station       Eating ADL Supervision  Increased time   % of Last Meal  Oral Nutrition: *  * Meal *  *, Lunch, Between % Consumed (80%)   Sleep No issues   Bowel Last BM: " 08/15/23   Bladder Post Void  13   Barriers to Discharge Home: none      Physical Therapy:  Bed Mobility    Transfers Contact Guard Assist  Adaptive equipment, Increased time, Set-up of equipment, Supervision for safety (stand pivot from w/c to bed)   Mobility Contact Guard Assist   Stairs Standby Assist   Barriers to Discharge Home: right sided weakness, impaired balance      Occupational Therapy:  Grooming Modified Independent, Seated   Bathing Contact Guard Assist (last assessed 8/10)   UB Dressing Supervision (last assessed 8/10)   LB Dressing Minimal Assist (last assessed 8/10)   Toileting Standby Assist   Shower & Tub Transfer Contact Guard Assist (last assessed 8/10)   Barriers to Discharge Home: right sided weakness      Speech-Language Pathology:  Comprehension:  Modified Independent  Comprehension Description:  Glasses  Expression:  Independent  Expression Description:     Social Interaction:  Independent  Social Interaction Description:     Problem Solving:  Modified Independent  Problem Solving Description:  Increased time, Supervision, Therapy schedule, Verbal cueing  Memory:  Supervision  Memory Description:  Increased time, Supervision, Therapy schedule, Verbal cueing  Barriers to Discharge Home: none    Respiratory Therapy:  O2 (LPM): 0  O2 Delivery Device: None - Room Air    Case Management:  Continues to work on disposition and DME needs.      Discharge Date/Disposition:    8/22    Outpatient PT/OT    Equip: SPC    Follow-ups: PCP, stroke bridge, cardiology, neurology       Problem List/Medical Decision Making and Plan:      L CAROL ANN stroke  Right hemiparesis, improved  Right neglect, improved  Mild cognitive impairment   PT/OT, 1.5 hr each discipline, 5 days per week  8/10: SLP picked up for 30 min, share other 30 min with OT/PT  Aqua therapy ordered -to start after Zio patch is removed  Recreational therapy ordered    Continue Zio patch, return in 2 weeks on 8/18    Continue Eliquis and  atorvastatin    Outpatient follow-up with stroke Bridge clinic, cardiology, referrals made    Right spasticity  Currently very mild and only at night  Continue to monitor    Seizure disorder  Last seizure November 2022  Generalized tonic-clonic  Outpatient follow-up with a provider in [place]  Continue lamotrigine     Tremor, left-sided  History of right MCA stroke  Outpatient referral to movement disorder as needed     Hypertension, resolved  Continue losartan  Continue hydralazine as needed, not requiring    Rash, resolved  Looks like rosacea  Given patient handout information  Outpatient follow-up with dermatology as needed     Hyperlipidemia  Continue atorvastatin    GI prophylaxis  Continue omeprazole    DVT prophylaxis  Continue Kevenis    Leona Pleitez M.D.  Physical Medicine and Rehabilitation

## 2023-08-17 ENCOUNTER — APPOINTMENT (OUTPATIENT)
Dept: OCCUPATIONAL THERAPY | Facility: REHABILITATION | Age: 68
DRG: 057 | End: 2023-08-17
Attending: PHYSICAL MEDICINE & REHABILITATION
Payer: COMMERCIAL

## 2023-08-17 ENCOUNTER — APPOINTMENT (OUTPATIENT)
Dept: SPEECH THERAPY | Facility: REHABILITATION | Age: 68
DRG: 057 | End: 2023-08-17
Attending: PHYSICAL MEDICINE & REHABILITATION
Payer: COMMERCIAL

## 2023-08-17 ENCOUNTER — APPOINTMENT (OUTPATIENT)
Dept: PHYSICAL THERAPY | Facility: REHABILITATION | Age: 68
DRG: 057 | End: 2023-08-17
Attending: PHYSICAL MEDICINE & REHABILITATION
Payer: COMMERCIAL

## 2023-08-17 PROCEDURE — 97129 THER IVNTJ 1ST 15 MIN: CPT

## 2023-08-17 PROCEDURE — 99232 SBSQ HOSP IP/OBS MODERATE 35: CPT | Performed by: PHYSICAL MEDICINE & REHABILITATION

## 2023-08-17 PROCEDURE — 770010 HCHG ROOM/CARE - REHAB SEMI PRIVAT*

## 2023-08-17 PROCEDURE — A9270 NON-COVERED ITEM OR SERVICE: HCPCS | Performed by: PHYSICAL MEDICINE & REHABILITATION

## 2023-08-17 PROCEDURE — 97112 NEUROMUSCULAR REEDUCATION: CPT

## 2023-08-17 PROCEDURE — 97130 THER IVNTJ EA ADDL 15 MIN: CPT

## 2023-08-17 PROCEDURE — 97116 GAIT TRAINING THERAPY: CPT

## 2023-08-17 PROCEDURE — 97535 SELF CARE MNGMENT TRAINING: CPT

## 2023-08-17 PROCEDURE — 700102 HCHG RX REV CODE 250 W/ 637 OVERRIDE(OP): Performed by: PHYSICAL MEDICINE & REHABILITATION

## 2023-08-17 RX ADMIN — ATORVASTATIN CALCIUM 80 MG: 40 TABLET, FILM COATED ORAL at 19:49

## 2023-08-17 RX ADMIN — OMEPRAZOLE 20 MG: 20 CAPSULE, DELAYED RELEASE ORAL at 08:21

## 2023-08-17 RX ADMIN — APIXABAN 5 MG: 5 TABLET, FILM COATED ORAL at 08:21

## 2023-08-17 RX ADMIN — LOSARTAN POTASSIUM 50 MG: 50 TABLET, FILM COATED ORAL at 05:40

## 2023-08-17 RX ADMIN — APIXABAN 5 MG: 5 TABLET, FILM COATED ORAL at 19:49

## 2023-08-17 RX ADMIN — LAMOTRIGINE 150 MG: 100 TABLET ORAL at 19:49

## 2023-08-17 ASSESSMENT — ACTIVITIES OF DAILY LIVING (ADL): BED_CHAIR_WHEELCHAIR_TRANSFER_DESCRIPTION: ADAPTIVE EQUIPMENT;INCREASED TIME;SUPERVISION FOR SAFETY;VERBAL CUEING

## 2023-08-17 ASSESSMENT — GAIT ASSESSMENTS
ASSISTIVE DEVICE: SINGLE POINT CANE
DISTANCE (FEET): 150
GAIT LEVEL OF ASSIST: CONTACT GUARD ASSIST
DEVIATION: DECREASED BASE OF SUPPORT;BRADYKINETIC;DECREASED HEEL STRIKE;DECREASED TOE OFF

## 2023-08-17 NOTE — CARE PLAN
"The patient is Stable - Low risk of patient condition declining or worsening    Problem: Pain - Standard  Goal: Alleviation of pain or a reduction in pain to the patient’s comfort goal  Outcome: Progressing  Note: Patient able to verbalize needs.  Denies pain or discomfort this shift and no s/s same noted.  Will continue to monitor.       Problem: Fall Risk - Rehab  Goal: Patient will remain free from falls  Outcome: Progressing   Mariela Rodgers Fall risk Assessment Score: 16    High fall risk Interventions   - Alarming seatbelt  - Bed and strip alarm   - Yellow sign by the door   - Yellow wrist band \"Fall risk\"  - Do not leave patient unattended in the bathroom  - Fall risk education provided        "

## 2023-08-17 NOTE — THERAPY
Physical Therapy   Daily Treatment     Patient Name: Doreen Noe  Age:  67 y.o., Sex:  female  Medical Record #: [id number]  Today's Date: 8/17/2023     Precautions  Precautions: Fall Risk  Comments: R hemiparesis, mild R neglect, hx of seizures and L side CVA, Zio patch    Subjective    Pt received up in , agreeable to participate. Reported that she only slept for approx 4 hrs d/t roommate taking a shower at 2AM.     Objective       08/17/23 1301   PT Charge Group   PT Gait Training (Units) 2   PT Neuromuscular Re-Education / Balance (Units) 2   PT Total Time Spent   PT Individual Total Time Spent (Mins) 60   Gait Functional Level of Assist    Gait Level Of Assist Contact Guard Assist   Assistive Device Single Point Cane   Distance (Feet) 150   # of Times Distance was Traveled 2   Deviation Decreased Base Of Support;Bradykinetic;Decreased Heel Strike;Decreased Toe Off  (infrequent scissoring)   Transfer Functional Level of Assist   Bed, Chair, Wheelchair Transfer Contact Guard Assist   Bed Chair Wheelchair Transfer Description Adaptive equipment;Increased time;Supervision for safety;Verbal cueing  (stand step SPC vs no AD)   Bed Mobility    Sit to Stand Contact Guard Assist  (for steadying d/t tendency for retropulsion and R sided LOB)   Neuro-Muscular Treatments   Neuro-Muscular Treatments Anterior weight shift;Compensatory Strategies;Postural Changes;Sensory Stimuli;Sequencing;Tactile Cuing;Verbal Cuing;Weight Shift Right;Weight Shift Left   Comments Pregait/reactive balance training c/ no UE support (see PT note for details)   Interdisciplinary Plan of Care Collaboration   IDT Collaboration with  Nursing;Certified Nursing Assistant   Patient Position at End of Therapy Seated;Call Light within Reach;Other (Comments)  (handoff to CNA on toilet)   Collaboration Comments notified RN re: pt request for room change; direct handoff to CNA     Pregait/reactive balance training c/ no UE support and CGA-min A for  balance (unless otherwise noted) approx 30-35 min total:  Amb forw/bwd and sidestepping in arc shape on stable ground c/ BUE holding green TB for additional balance/core stability challenge  Amb forw/bwd and sidestepping on compliant surface c/ BUE holding green TB for additional balance/core stability challenge  Standing c/ 1 LE on dynadisc and other LE lifted into air for 1 sec at a time to facilitate stepping strategy - required mod A fading to min A-CGA as pt improved, used mirror feedback for additional sensory input    Assessment    Pt tolerated session well focused on reactive balance training. Pt demo'ed notable improvement within session from relying solely on ankle and hip strategy (no stepping strategy) and requiring up to mod A to prevent posterior LOB to intermittently applying stepping strategy LLE>RLE. Would benefit from cont practice to dec fall risk and inc safety at home upon d/c.    Strengths: Able to follow instructions, Effective communication skills, Good insight into deficits/needs, Independent prior level of function, Pleasant and cooperative, Motivated for self care and independence  Barriers: Decreased endurance, Fatigue, Generalized weakness, Hemiparesis, Home accessibility, Impaired activity tolerance, Impaired balance, Spasticity    Plan    Gait training with SPC - incorporate head turns/visual disturbances/busy environments for balance and attention training, sit<>stands with emphasis on sequencing/safety/avoiding retropulsion, continue treadmill training, stairs (lives in [facility]), reactive standing balance, mat program, RLE and trunk NRE, RLE forced use, txfer training SPC, bed mobility, R sided awareness, floor recovery. Nustep adjunct c/ techs 2-3x/wk as able. Vector profile in [facility]. Aquatic therapy on Sun 8/20.     Passport items to be completed:  Get in/out of bed safely, in/out of a vehicle, safely use mobility device, walk or wheel around home/community,  navigate up and down stairs, show how to get up/down from the ground, ensure home is accessible, demonstrate HEP, complete caregiver training    Physical Therapy Problems (Active)       Problem: PT-Long Term Goals       Dates: Start:  08/05/23         Goal: LTG-By discharge, patient will ambulate 75' with FWW and SBA       Dates: Start:  08/05/23            Goal: LTG-By discharge, patient will transfer one surface to another with FWW independently.        Dates: Start:  08/05/23            Goal: LTG-By discharge, patient will ambulate up/down 4-6 stairs with B handrails and SBA       Dates: Start:  08/05/23            Goal: LTG-By discharge, patient will transfer in/out of a car with FWW and supervision.        Dates: Start:  08/05/23

## 2023-08-17 NOTE — THERAPY
Speech Language Pathology  Daily Treatment     Patient Name: Doreen Noe  Age:  67 y.o., Sex:  female  Medical Record #: [id number]  Today's Date: 8/17/2023     Precautions  Precautions: Fall Risk  Comments: R hemiparesis, mild R neglect, hx of seizures and L side CVA, Zio patch    Subjective    Patient agreeable to therapy.     Objective       08/17/23 1001   Treatment Charges   SLP Cognitive Skill Development First 15 Minutes 1   SLP Cognitive Skill Development Additional 15 Minutes 1   SLP Total Time Spent   SLP Individual Total Time Spent (Mins) 30   Cognition   Prospective Memory Within Functional Limits (6-7)   Functional Memory Activities Within Functional Limits (6-7)   Functional Level of Assist   Comprehension Modified Independent   Comprehension Description Glasses   Expression Independent   Social Interaction Independent   Problem Solving Modified Independent   Problem Solving Description Increased time   Memory Modified Independent   Memory Description Increased time   Discharge Summary    Progress since Admit Patient has made good progress toward goals.  She is mod I for medication mangagement, and recall and safety problem solving.   Discharge Location  Home   Patient Discharging with Assist of No one, Patient will be Alone;Other (See Comments)  (family checking in.  Patient will continue with PT/OT services at this facility until time of discharge.)   Level of Supervision Required Other (See Comments)  (Suggest family provide intermittent supervision with complex health care or financial management.)   Recommended Services Upon Discharge No Follow-Up Speech Therapy Recommended   Long Term Goals Met Patient has made good progress toward goals.  She is mod I for medication mangagement, and recall and safety problem solving.   Criteria for Termination of Services Maximum Function Achieved for Inpatient Rehabilitation   Discharge Instructions   Supervision Request help from family with any complex  health care or financial management if needed.   Cognition / Communication Recommend purchase a pill box with compartments for A.M and P.M.   Set it up once a week and keep a routine for taking medications.Think about challenges to safety before performing tasks and the strategies and sequences you have learned to perform them safely before starting your activity.  Use RWAVS memory strategies to reinforce new learning.  R - repeat, repeat, repeat.   W - write it down or get it in writing.   A - associate the new information with something that you already know very well.   V - visualize it, practice in your mind's eye, when you aren't able to perform the action physically.  S - say it back, out loud.  This benefits you, as you repeat the information for practice. You also are seeking clarification from the educator, evaluating to see if all of the information was understood, and prompting feedback if needed.  And it slows down the exchange, buying extra time to process the information.         Assessment    Patient was able to independently recall 5/5 unrelated words and compensatory memory strategies trained from yesterday.  Continued patient education on use of memory log to record the skills and sequences she is learning to perform for balance maintenance and record these.  Reviewed safety sequences and patient was able to verbalize back.  Recommended use of pill box at home with compartments for A.M. and P.M.    Strengths: Able to follow instructions, Alert and oriented, Effective communication skills, Independent prior level of function, Manages pain appropriately, Pleasant and cooperative, Supportive family, Motivated for self care and independence, Willingly participates in therapeutic activities (chronic tremors, some visual impairment from cataracts per patient report)  Barriers: Hemiparesis    Plan    Recommend D/C ST at this time.    Passport items completed:      Speech Therapy Problems (Active)        There are no active problems.

## 2023-08-17 NOTE — PROGRESS NOTES
NURSING DAILY NOTE    Name: Doreen Noe   Date of Admission: 8/4/2023   Admitting Diagnosis: Ischemic stroke (HCC)  Attending Physician: Leona Pleitez M.d.  Allergies: Patient has no known allergies.    Safety  Patient Assist  min  Patient Precautions  Fall Risk  Precaution Comments  R hemiparesis, mild R neglect, hx of seizures and L side CVA, Zio patch  Bed Transfer Status  Contact Guard Assist  Toilet Transfer Status   Contact Guard Assist  Assistive Devices  Wheelchair  Oxygen  None - Room Air  Diet/Therapeutic Dining  Current Diet Order   Procedures    Diet Order Diet: Regular; Tray Modifications (optional): SLP - Deliver to Nursing Station     Pill Administration  whole  Agitated Behavioral Scale     ABS Level of Severity       Fall Risk  Has the patient had a fall this admission?   No  Mariela Rodgers Fall Risk Scoring  16, HIGH RISK  Fall Risk Safety Measures  bed alarm and chair alarm    Vitals  Temperature: 36.6 °C (97.8 °F)  Temp src: Oral  Pulse: 66  Respiration: 20  Blood Pressure : 118/68  Blood Pressure MAP (Calculated): 85 MM HG  BP Location: Left, Upper Arm  Patient BP Position: Supine     Oxygen  Pulse Oximetry: 95 %  O2 (LPM): 0  O2 Delivery Device: None - Room Air    Bowel and Bladder  Last Bowel Movement  08/15/23  Stool Type  Type 4: Like a sausage or snake, smooth and soft  Bowel Device  Bathroom  Continent  Bladder: Continent void   Bowel: Continent movement  Bladder Function  Urine Void (mL):  (Moderate)  Number of Times Voided: 1  Urinary Options: Yes  Urine Color: Yellow  Genitourinary Assessment   Bladder Assessment (WDL):  WDL Except  Engel Catheter: Not Applicable  Urine Color: Yellow  Bladder Device: Bathroom  Bladder Scan: Post Void  $ Bladder Scan Results (mL): 13    Skin  Angus Score   18  Sensory Interventions   Bed Types: Standard/Trauma Mattress  Skin Preventative Measures: Pillows in Use for Support /  Positioning  Moisture Interventions  Moisturizers/Barriers: Barrier Wipes      Pain  Pain Rating Scale  0 - No Pain  Pain Location  Neck  Pain Location Orientation     Pain Interventions   Declines    ADLs    Bathing   Shower, * * With Assistance from, Self Care, No Assistance Required  Linen Change      Personal Hygiene  Moist Preeti Wipes  Chlorhexidine Bath      Oral Care  Brushed Teeth  Teeth/Dentures     Shave     Nutrition Percentage Eaten  Dinner, Between % Consumed  Environmental Precautions  Treaded Slipper Socks on Patient, Personal Belongings, Wastebasket, Call Bell etc. in Easy Reach, Transferred to Stronger Side, Report Given to Other Health Care Providers Regarding Fall Risk, Bed in Low Position  Patient Turns/Positioning  Patient Turns Self from Side to Side  Patient Turns Assistance/Tolerance     Bed Positions  Bed Controls On  Head of Bed Elevated  Self regulated      Psychosocial/Neurologic Assessment  Psychosocial Assessment  Psychosocial (WDL):  Within Defined Limits  Neurologic Assessment  Neuro (WDL): Exceptions to WDL  Level of Consciousness: Alert  Orientation Level: Oriented X4  Cognition: Appropriate judgement, Appropriate safety awareness, Appropriate attention/concentration, Appropriate for developmental age, Follows commands  Speech: Clear  Pupil Assesment: No  Motor Function/Sensation Assessment: Dorsiflexion, Motor response, Sensation, Motor strength  R Foot Dorsiflexion: Weak  L Foot Dorsiflexion: Strong  RUE Motor Response: Responds to commands  RUE Sensation: Full sensation  Muscle Strength Right Arm: Good Strength Against Gravity and Moderate Resistance  LUE Motor Response: Tremors, Responds to commands  LUE Sensation: Full sensation  Muscle Strength Left Arm: Normal Strength Against Gravity and Full Resistance  RLE Motor Response: Responds to commands  RLE Sensation: Numbness  Muscle Strength Right Leg: Weak Movement but Not Against Gravity or Resistance  LLE Motor Response:  Responds to commands  LLE Sensation: Full sensation  Muscle Strength Left Leg: Normal Strength Against Gravity and Full Resistance  EENT (WDL):  WDL Except    Cardio/Pulmonary Assessment  Edema      Respiratory Breath Sounds  RUL Breath Sounds: Clear  RML Breath Sounds: Clear  RLL Breath Sounds: Clear  MONIKA Breath Sounds: Clear  LLL Breath Sounds: Clear  Cardiac Assessment   Cardiac (WDL):  WDL Except (hx htn, hld)

## 2023-08-17 NOTE — PROGRESS NOTES
"Rehab Progress Note     Date of Service: 8/17/2023  Chief Complaint: Follow-up stroke    Interval Events (Subjective)    Patient seen and examined today in her room.  She is eating and sleeping well.  She denies any pain.  She last moved her bowels this morning.  We discussed her discharge date.  Her cardiac monitor will be removed tomorrow so she was able to do some aqua therapy over the weekend.    Patient has no other new questions, concerns, or complaints today.           Objective:  VITAL SIGNS: /74   Pulse 68   Temp 36.3 °C (97.3 °F) (Oral)   Resp 20   Ht 1.702 m (5' 7.01\")   Wt 64 kg (141 lb 1.5 oz)   SpO2 94%   BMI 22.09 kg/m²   Gen: alert, no apparent distress  CV: Regular rate, regular rhythm, cardiac monitor in the left upper chest wall  Resp: Clear to auscultation bilaterally  Neuro: Chronic tremor of the left upper extremity, improved right hemiparesis        No results found for this or any previous visit (from the past 72 hour(s)).        Scheduled Medications   Medication Dose Frequency    losartan  50 mg Q DAY    senna-docusate  2 Tablet BID    omeprazole  20 mg QAM AC    apixaban  5 mg BID    atorvastatin  80 mg Q EVENING    lamoTRIgine  150 mg Q EVENING       Current Diet Order   Procedures    Diet Order Diet: Regular; Tray Modifications (optional): SLP - Deliver to Nursing Station       Assessment:    This patient is a 67 y.o. female admitted for acute inpatient rehabilitation with Ischemic stroke (HCC).      I, Leona Pleitez M.D./MD., was present and led the interdisciplinary team conference on 8/16/2023.  I led the IDT conference and agree with the IDT conference documentation and plan of care as noted below.     Nursing:  Diet Current Diet Order   Procedures    Diet Order Diet: Regular; Tray Modifications (optional): SLP - Deliver to Nursing Station       Eating ADL Supervision  Increased time   % of Last Meal  Oral Nutrition: *  * Meal *  *, Lunch, Between % Consumed " (80%)   Sleep No issues   Bowel Last BM: 08/15/23   Bladder Post Void  13   Barriers to Discharge Home: none      Physical Therapy:  Bed Mobility    Transfers Contact Guard Assist  Adaptive equipment, Increased time, Set-up of equipment, Supervision for safety (stand pivot from w/c to bed)   Mobility Contact Guard Assist   Stairs Standby Assist   Barriers to Discharge Home: right sided weakness, impaired balance      Occupational Therapy:  Grooming Modified Independent, Seated   Bathing Contact Guard Assist (last assessed 8/10)   UB Dressing Supervision (last assessed 8/10)   LB Dressing Minimal Assist (last assessed 8/10)   Toileting Standby Assist   Shower & Tub Transfer Contact Guard Assist (last assessed 8/10)   Barriers to Discharge Home: right sided weakness      Speech-Language Pathology:  Comprehension:  Modified Independent  Comprehension Description:  Glasses  Expression:  Independent  Expression Description:     Social Interaction:  Independent  Social Interaction Description:     Problem Solving:  Modified Independent  Problem Solving Description:  Increased time, Supervision, Therapy schedule, Verbal cueing  Memory:  Supervision  Memory Description:  Increased time, Supervision, Therapy schedule, Verbal cueing  Barriers to Discharge Home: none    Respiratory Therapy:  O2 (LPM): 0  O2 Delivery Device: None - Room Air    Case Management:  Continues to work on disposition and DME needs.      Discharge Date/Disposition:    8/22    Outpatient PT/OT    Equip: SPC    Follow-ups: PCP, stroke bridge, cardiology, neurology       Problem List/Medical Decision Making and Plan:      L CAROL ANN stroke  Right hemiparesis, improved  Right neglect, improved  Mild cognitive impairment   PT/OT, 1.5 hr each discipline, 5 days per week  8/10: SLP picked up for 30 min, share other 30 min with OT/PT  Aqua therapy ordered -to start after Zio patch is removed  Recreational therapy ordered    Continue Zio patch, return  tomorrow    Continue Eliquis and atorvastatin    Outpatient follow-up with stroke Bridge clinic, cardiology, referrals made    Right spasticity  Currently very mild and only at night  Continue to monitor    Seizure disorder  Last seizure November 2022  Generalized tonic-clonic  Outpatient follow-up with a provider in [place]  Continue lamotrigine     Tremor, left-sided  History of right MCA stroke  Outpatient referral to movement disorder as needed     Hypertension, resolved  Continue losartan  Continue hydralazine as needed, not requiring    Rash, resolved  Looks like rosacea  Given patient handout information  Outpatient follow-up with dermatology as needed     Hyperlipidemia  Continue atorvastatin    GI prophylaxis  Continue omeprazole while on Eliquis    DVT prophylaxis  Continue Eliquis    Leona Pleitez M.D.  Physical Medicine and Rehabilitation

## 2023-08-17 NOTE — PROGRESS NOTES
NURSING DAILY NOTE    Name: Doreen Noe   Date of Admission: 8/4/2023   Admitting Diagnosis: Ischemic stroke (HCC)  Attending Physician: Leona Pleitez M.d.  Allergies: Patient has no known allergies.    Safety  Patient Assist  min  Patient Precautions  Fall Risk  Precaution Comments  R hemiparesis, mild R neglect, hx of seizures and L side CVA, Zio patch  Bed Transfer Status  Contact Guard Assist  Toilet Transfer Status   Contact Guard Assist  Assistive Devices  Cane - single point  Oxygen  None - Room Air  Diet/Therapeutic Dining  Current Diet Order   Procedures    Diet Order Diet: Regular; Tray Modifications (optional): SLP - Deliver to Nursing Station     Pill Administration  whole and one at a time   Agitated Behavioral Scale     ABS Level of Severity       Fall Risk  Has the patient had a fall this admission?   No  Mariela Rodgers Fall Risk Scoring  16, HIGH RISK  Fall Risk Safety Measures  bed alarm, chair alarm, and seatbelt alarm    Vitals  Temperature: 36.8 °C (98.2 °F)  Temp src: Oral  Pulse: 68  Respiration: 18  Blood Pressure : 125/84  Blood Pressure MAP (Calculated): 98 MM HG  BP Location: Left, Upper Arm  Patient BP Position: Supine     Oxygen  Pulse Oximetry: 96 %  O2 (LPM): 0  O2 Delivery Device: None - Room Air    Bowel and Bladder  Last Bowel Movement  08/15/23  Stool Type  Type 4: Like a sausage or snake, smooth and soft  Bowel Device  Bathroom  Continent  Bladder: Continent void   Bowel: Continent movement  Bladder Function  Urine Void (mL):  (large)  Number of Times Voided: 1  Urinary Options: Yes  Urine Color: Yellow  Genitourinary Assessment   Bladder Assessment (WDL):  WDL Except  Engel Catheter: Not Applicable  Urine Color: Yellow  Bladder Device: Bathroom  Bladder Scan: Post Void  $ Bladder Scan Results (mL): 13    Skin  Angus Score   18  Sensory Interventions   Bed Types: Standard/Trauma Mattress  Skin Preventative Measures:  Pillows in Use for Support / Positioning  Moisture Interventions  Moisturizers/Barriers: Barrier Wipes      Pain  Pain Rating Scale  0 - No Pain  Pain Location  Neck  Pain Location Orientation     Pain Interventions   Declines    ADLs    Bathing   Shower, * * With Assistance from, Self Care, No Assistance Required  Linen Change      Personal Hygiene  Moist Preeti Wipes  Chlorhexidine Bath      Oral Care  Brushed Teeth  Teeth/Dentures     Shave     Nutrition Percentage Eaten  Dinner, Between % Consumed  Environmental Precautions  Treaded Slipper Socks on Patient, Personal Belongings, Wastebasket, Call Bell etc. in Easy Reach, Transferred to Stronger Side, Report Given to Other Health Care Providers Regarding Fall Risk, Bed in Low Position  Patient Turns/Positioning  Patient Turns Self from Side to Side  Patient Turns Assistance/Tolerance     Bed Positions  Bed Controls On  Head of Bed Elevated  Self regulated      Psychosocial/Neurologic Assessment  Psychosocial Assessment  Psychosocial (WDL):  Within Defined Limits  Neurologic Assessment  Neuro (WDL): Exceptions to WDL  Level of Consciousness: Alert  Orientation Level: Oriented X4  Cognition: Appropriate judgement, Appropriate safety awareness, Appropriate attention/concentration, Appropriate for developmental age, Follows commands  Speech: Clear  Pupil Assesment: No  Motor Function/Sensation Assessment: Dorsiflexion, Motor response, Sensation, Motor strength  R Foot Dorsiflexion: Weak  L Foot Dorsiflexion: Strong  RUE Motor Response: Responds to commands  RUE Sensation: Full sensation  Muscle Strength Right Arm: Good Strength Against Gravity and Moderate Resistance  LUE Motor Response: Tremors, Responds to commands  LUE Sensation: Full sensation  Muscle Strength Left Arm: Normal Strength Against Gravity and Full Resistance  RLE Motor Response: Responds to commands  RLE Sensation: Numbness  Muscle Strength Right Leg: Weak Movement but Not Against Gravity or  Resistance  LLE Motor Response: Responds to commands  LLE Sensation: Full sensation  Muscle Strength Left Leg: Normal Strength Against Gravity and Full Resistance  EENT (WDL):  WDL Except    Cardio/Pulmonary Assessment  Edema      Respiratory Breath Sounds  RUL Breath Sounds: Clear  RML Breath Sounds: Clear  RLL Breath Sounds: Clear  MONIKA Breath Sounds: Clear  LLL Breath Sounds: Clear  Cardiac Assessment   Cardiac (WDL):  WDL Except (hx htn. hld)

## 2023-08-17 NOTE — CARE PLAN
Problem: Speech/Swallowing LTGs  Goal: LTG-By discharge, patient will solve complex problem and recall new training for safety with 90% acc mod I for safe discharge home.  Outcome: Met     Problem: Memory STGs  Goal: STG-Within one week, patient will use compensatory memory strategies and external memory aids to recall daily events and safety sequencing with 90% acc with mod I.  Outcome: Met

## 2023-08-17 NOTE — THERAPY
Occupational Therapy  Daily Treatment     Patient Name: Doreen Noe  Age:  67 y.o., Sex:  female  Medical Record #: [id number]  Today's Date: 8/17/2023     Precautions  Precautions: Fall Risk  Comments: R hemiparesis, mild R neglect, hx of seizures and L side CVA, Zio patch    Subjective    Patient seated in w/c in dining room upon arrival, agreeable to participate in OT.      Objective     08/17/23 0831   OT Charge Group   OT Self Care / ADL (Units) 4   OT Total Time Spent   OT Individual Total Time Spent (Mins) 60   Vitals   O2 Delivery Device None - Room Air   Sleep/Wake Cycle   Sleep & Rest Awake   Functional Level of Assist   Grooming Standby Assist;Standing  (to brush teeth and apply face cream while standing @ sink)   Bathing Contact Guard Assist  (while incorporating sitting and standing)   Upper Body Dressing Supervision  (to don sports bra and shirt while seated)   Lower Body Dressing Minimal Assist  (min A to thread RLE through leggings following shower (due to damp), able to don socks and shoes @ supervision level while seated)   Tub / Shower Transfers Contact Guard Assist  (SPC level)   Interdisciplinary Plan of Care Collaboration   Patient Position at End of Therapy Seated;Self Releasing Lap Belt Applied;Call Light within Reach     Max A to regain balance following initial STS from w/c (due to R lateral LOB)     CGA for functional mobility room <> shower w/ SPC.     Ongoing education w/ patient re: adaptive strategies/ functional sequencing (ie widening BRADY, STS sequence)    Assessment    Patient tolerated OT session well with focus on progression with ADLs. 1 significant LOB episode noted at start of session, requiring max A to safely recover. Blocked STS practice recommended to facilitate independence/consistency. Incorporates RUE well into ADL routine.      Plan    Blocked STS practice, standing balance, mobility @ SPC level, continue increasing overall strength and endurance     Passport items  to be completed:  Perform bathroom transfers, complete dressing, complete feeding, get ready for the day, prepare a simple meal, participate in household tasks, adapt home for safety needs, demonstrate home exercise program, complete caregiver training        Occupational Therapy Goals (Active)       Problem: Dressing       Dates: Start:  08/05/23         Goal: STG-Within one week, patient will dress UB at a Mod I level.        Dates: Start:  08/05/23         Goal Note filed on 08/16/23 1042 by April Bowens OT       SPV                 Problem: OT Long Term Goals       Dates: Start:  08/05/23         Goal: LTG-By discharge, patient will complete basic self care tasks at a SUP/Mod I level with AE/AD/DME PRN.        Dates: Start:  08/05/23            Goal: LTG-By discharge, patient will perform bathroom transfers at a SUP/Mod I level with AE/AD/DME PRN.        Dates: Start:  08/05/23            Goal: LTG-By discharge, patient will complete basic home management at a Min A to Mod I level with LRD and AE PRN.        Dates: Start:  08/05/23

## 2023-08-18 ENCOUNTER — APPOINTMENT (OUTPATIENT)
Dept: PHYSICAL THERAPY | Facility: REHABILITATION | Age: 68
DRG: 057 | End: 2023-08-18
Attending: PHYSICAL MEDICINE & REHABILITATION
Payer: COMMERCIAL

## 2023-08-18 ENCOUNTER — APPOINTMENT (OUTPATIENT)
Dept: OCCUPATIONAL THERAPY | Facility: REHABILITATION | Age: 68
DRG: 057 | End: 2023-08-18
Attending: PHYSICAL MEDICINE & REHABILITATION
Payer: COMMERCIAL

## 2023-08-18 PROCEDURE — 97112 NEUROMUSCULAR REEDUCATION: CPT

## 2023-08-18 PROCEDURE — 99232 SBSQ HOSP IP/OBS MODERATE 35: CPT | Performed by: PHYSICAL MEDICINE & REHABILITATION

## 2023-08-18 PROCEDURE — 700102 HCHG RX REV CODE 250 W/ 637 OVERRIDE(OP): Performed by: PHYSICAL MEDICINE & REHABILITATION

## 2023-08-18 PROCEDURE — 97116 GAIT TRAINING THERAPY: CPT

## 2023-08-18 PROCEDURE — A9270 NON-COVERED ITEM OR SERVICE: HCPCS | Performed by: PHYSICAL MEDICINE & REHABILITATION

## 2023-08-18 PROCEDURE — 770010 HCHG ROOM/CARE - REHAB SEMI PRIVAT*

## 2023-08-18 PROCEDURE — 97530 THERAPEUTIC ACTIVITIES: CPT

## 2023-08-18 RX ADMIN — APIXABAN 5 MG: 5 TABLET, FILM COATED ORAL at 08:19

## 2023-08-18 RX ADMIN — LOSARTAN POTASSIUM 50 MG: 50 TABLET, FILM COATED ORAL at 05:18

## 2023-08-18 RX ADMIN — APIXABAN 5 MG: 5 TABLET, FILM COATED ORAL at 20:07

## 2023-08-18 RX ADMIN — ATORVASTATIN CALCIUM 80 MG: 40 TABLET, FILM COATED ORAL at 20:07

## 2023-08-18 RX ADMIN — OMEPRAZOLE 20 MG: 20 CAPSULE, DELAYED RELEASE ORAL at 08:19

## 2023-08-18 RX ADMIN — LAMOTRIGINE 150 MG: 100 TABLET ORAL at 20:07

## 2023-08-18 ASSESSMENT — GAIT ASSESSMENTS
DEVIATION: DECREASED BASE OF SUPPORT;BRADYKINETIC;DECREASED HEEL STRIKE;DECREASED TOE OFF
ASSISTIVE DEVICE: SINGLE POINT CANE;OTHER (COMMENTS)
GAIT LEVEL OF ASSIST: CONTACT GUARD ASSIST
ASSISTIVE DEVICE: SINGLE POINT CANE
GAIT LEVEL OF ASSIST: CONTACT GUARD ASSIST
DISTANCE (FEET): 250
DISTANCE (FEET): 200

## 2023-08-18 ASSESSMENT — PAIN DESCRIPTION - PAIN TYPE: TYPE: ACUTE PAIN

## 2023-08-18 NOTE — THERAPY
"Physical Therapy   Daily Treatment     Patient Name: Doreen Noe  Age:  67 y.o., Sex:  female  Medical Record #: [id number]  Today's Date: 8/18/2023     Precautions  Precautions: Fall Risk  Comments: R hemiparesis, mild R neglect, hx of seizures and L side CVA, Zio patch    Subjective    Patient reports she plans to use SPC indoors, and bilateral trekking poles for outdoor ambulation     Objective       08/18/23 1031   PT Charge Group   PT Gait Training (Units) 2   PT Total Time Spent   PT Individual Total Time Spent (Mins) 30   Gait Functional Level of Assist    Gait Level Of Assist Contact Guard Assist   Assistive Device Single Point Cane;Other (Comments)   Distance (Feet) 200  (200ft SPC SBA indoors, 200ft treking poles SBA indoors, 200ft treking poles CGA outdoors over uneven terrain)   # of Times Distance was Traveled 2   Deviation   (occasional right foot \"scuff\" with fatigue)   Interdisciplinary Plan of Care Collaboration   IDT Collaboration with  Physician   Collaboration Comments discharge planning for Tuesday       Assessment    Able to demonstrate safe ambulation with SPC 200ft SBA indoors, bilateral trekking poles 200ft CGA outdoor on uneven terrain (curb, sand, ramps)    Strengths: Able to follow instructions, Effective communication skills, Good insight into deficits/needs, Independent prior level of function, Pleasant and cooperative, Motivated for self care and independence  Barriers: Decreased endurance, Fatigue, Generalized weakness, Hemiparesis, Home accessibility, Impaired activity tolerance, Impaired balance, Spasticity    Plan    Gait training with SPC - incorporate head turns/visual disturbances/busy environments for balance and attention training, sit<>stands with emphasis on sequencing/safety/avoiding retropulsion, continue treadmill training, stairs ([facility]), reactive standing balance, mat program, RLE and trunk NRE, RLE forced use, txfer training SPC, bed mobility, " R sided awareness, floor recovery. Nustep adjunct c/ techs 2-3x/wk as able. Vector profile in [place]. Aquatic therapy on Sun 8/20.     Passport items to be completed:  Get in/out of bed safely, in/out of a vehicle, safely use mobility device, walk or wheel around home/community, navigate up and down stairs, show how to get up/down from the ground, ensure home is accessible, demonstrate HEP, complete caregiver training    Physical Therapy Problems (Active)       Problem: PT-Long Term Goals       Dates: Start:  08/05/23         Goal: LTG-By discharge, patient will ambulate 75' with FWW and SBA       Dates: Start:  08/05/23            Goal: LTG-By discharge, patient will transfer one surface to another with FWW independently.        Dates: Start:  08/05/23            Goal: LTG-By discharge, patient will ambulate up/down 4-6 stairs with B handrails and SBA       Dates: Start:  08/05/23            Goal: LTG-By discharge, patient will transfer in/out of a car with FWW and supervision.        Dates: Start:  08/05/23

## 2023-08-18 NOTE — THERAPY
Occupational Therapy  Daily Treatment     Patient Name: Doreen Noe  Age:  67 y.o., Sex:  female  Medical Record #: [id number]  Today's Date: 8/18/2023     Precautions  Precautions: (P) Fall Risk  Comments: (P) R hemiparesis, hx of seizures and L side CVA, Zio patch    Subjective    Patient in bed upon arrival for both OT sessions, agreeable to participate in OT.      Objective       08/18/23 1301 08/18/23 1431   OT Charge Group   OT Neuromuscular Re-education / Balance (Units) 2 1   OT Therapy Activity (Units) 2 1   OT Total Time Spent   OT Individual Total Time Spent (Mins) 60 30   Precautions   Precautions Fall Risk Fall Risk   Comments R hemiparesis, hx of seizures and L side CVA, Zio patch R hemiparesis, hx of seizures and L side CVA, Zio patch   Vitals   O2 Delivery Device None - Room Air None - Room Air   Cognition    Level of Consciousness Alert Alert   Sleep/Wake Cycle   Sleep & Rest Awake Awake   IADL Treatments   IADL Treatments  --  Meal preparation   Meal Preparation  --  Patient decorated muffins (using icing and sprinkles) while standing @ SBA level   9 Hole Peg Test   Right Hand (seconds) 32  --    Left Hand (seconds) 29  --    Interdisciplinary Plan of Care Collaboration   Patient Position at End of Therapy In Bed;Call Light within Reach In Bed;Call Light within Reach     CGA to SBA for bed txfrs @ SPC level    Toileting - SBA w/ use of GB     Supervision to wash hands while standing @ sink     CGA for outdoor mobility ~600' x 2 and ~300', initially without SPC, however used cane for majority of mobility outdoors. Min A provided 2-3x due to mild LOB episodes    Floor recovery on grass - patient able to lower down onto grass w/ min - CGA, mod A to return to standing     Reciprocal marching outdoors w/ SPC ~ 100' w/ min-CGA    Reviewed C HEP and provided handout to maximize carryover    Patient opened 2 medication organizers and pill bottles x 5 independently (w/ mildly increased  time)    Assessment    Patient demo's improved safety, independence and consistency w/ STS (SBA from bed, toilet and low bench outdoors). Patient also demo's significant improvement w/ RUE functional use since initial eval.     Plan    Blocked STS practice, standing balance, mobility w/ LRAD, continue increasing overall strength and endurance     Passport items to be completed:  Perform bathroom transfers, complete dressing, complete feeding, get ready for the day, prepare a simple meal, participate in household tasks, adapt home for safety needs, demonstrate home exercise program, complete caregiver training     Occupational Therapy Goals (Active)       Problem: Dressing       Dates: Start:  08/05/23         Goal: STG-Within one week, patient will dress UB at a Mod I level.        Dates: Start:  08/05/23         Goal Note filed on 08/16/23 1042 by April Bowens OT       SPV                 Problem: OT Long Term Goals       Dates: Start:  08/05/23         Goal: LTG-By discharge, patient will complete basic self care tasks at a SUP/Mod I level with AE/AD/DME PRN.        Dates: Start:  08/05/23            Goal: LTG-By discharge, patient will perform bathroom transfers at a SUP/Mod I level with AE/AD/DME PRN.        Dates: Start:  08/05/23            Goal: LTG-By discharge, patient will complete basic home management at a Min A to Mod I level with LRD and AE PRN.        Dates: Start:  08/05/23

## 2023-08-18 NOTE — CARE PLAN
The patient is Stable - Low risk of patient condition declining or worsening      Problem: Pain - Standard  Goal: Alleviation of pain or a reduction in pain to the patient’s comfort goal  Outcome: Progressing   Note: Patient able to verbalize pain level and verbalize an acceptable level of pain.         Problem: Skin Integrity  Goal: Patient's skin integrity will be maintained or improve  Outcome: Progressing   Note: Patient's skin remains intact and free from new or accidental injury this shift.  Will continue to monitor.

## 2023-08-18 NOTE — THERAPY
Physical Therapy   Daily Treatment     Patient Name: Doreen Noe  Age:  67 y.o., Sex:  female  Medical Record #: [id number]  Today's Date: 8/18/2023     Precautions  Precautions: Fall Risk  Comments: R hemiparesis, mild R neglect, hx of seizures and L side CVA, Zio patch    Subjective    Pt was seated in w/c upon arrival and agreeable to treatment.       Objective       08/18/23 0831   PT Charge Group   PT Gait Training (Units) 2   PT Neuromuscular Re-Education / Balance (Units) 2   PT Total Time Spent   PT Individual Total Time Spent (Mins) 60   Precautions   Precautions Fall Risk   Gait Functional Level of Assist    Gait Level Of Assist Contact Guard Assist   Assistive Device Single Point Cane   Distance (Feet) 250   # of Times Distance was Traveled 4   Deviation Decreased Base Of Support;Bradykinetic;Decreased Heel Strike;Decreased Toe Off  (intermittent scissoring, especially with turning)   Interdisciplinary Plan of Care Collaboration   IDT Collaboration with  Occupational Therapist   Patient Position at End of Therapy Seated;Chair Alarm On;Self Releasing Lap Belt Applied;Call Light within Reach;Tray Table within Reach;Phone within Reach   Collaboration Comments CLOF     Gait training completed with focus on dynamic balance for a total of ~1000 feet:  Gait training with SPC with horizontal head turns  Gait training in open environment with focus on obstacle navigation and cognitive dual tasking  Facilitated gait using poles to promote increased B arm swing  STS training with mat table at progressively lower seat height with focus on sequencing and safety with and without UE support x 10 reps; progressing to w/c level   Education provided on gait deviations and balance systems and strategies    Assessment    Pt demonstrated good progression with gait training this session with increased challenge of open environment and more dynamic balance challenges.  Pt continues to demonstrate intermittent scissoring and  narrow Arelis with turning and will continue to benefit from dynamic gait and balance interventions. Pt with improving anterior lean with STS with VCing.   Strengths: Able to follow instructions, Effective communication skills, Good insight into deficits/needs, Independent prior level of function, Pleasant and cooperative, Motivated for self care and independence  Barriers: Decreased endurance, Fatigue, Generalized weakness, Hemiparesis, Home accessibility, Impaired activity tolerance, Impaired balance, Spasticity    Plan    Gait training with SPC - incorporate head turns/visual disturbances/busy environments for balance and attention training, sit<>stands with emphasis on sequencing/safety/avoiding retropulsion, continue treadmill training, stairs (lives in [facility] [address] [facility]), reactive standing balance, mat program, RLE and trunk NRE, RLE forced use, txfer training SPC, bed mobility, R sided awareness, floor recovery. Nustep adjunct c/ techs 2-3x/wk as able. Vector profile in [facility]. Aquatic therapy on Sun 8/20.     Passport items to be completed:  Get in/out of bed safely, in/out of a vehicle, safely use mobility device, walk or wheel around home/community, navigate up and down stairs, show how to get up/down from the ground, ensure home is accessible, demonstrate HEP, complete caregiver training    Physical Therapy Problems (Active)       Problem: PT-Long Term Goals       Dates: Start:  08/05/23         Goal: LTG-By discharge, patient will ambulate 75' with FWW and SBA       Dates: Start:  08/05/23            Goal: LTG-By discharge, patient will transfer one surface to another with FWW independently.        Dates: Start:  08/05/23            Goal: LTG-By discharge, patient will ambulate up/down 4-6 stairs with B handrails and SBA       Dates: Start:  08/05/23            Goal: LTG-By discharge, patient will transfer in/out of a car with FWW and supervision.        Dates: Start:  08/05/23

## 2023-08-18 NOTE — DISCHARGE PLANNING
Resent request for outpatient therapy to [facility] and request for DME to [facility].     Will confirm acceptance.     08/18/2023- 1131. [facility] declined. Sent order to [facility] in [place]. They accept location and insurance. Will confirm acceptance.     08/18/2023- 1324. Spoke with [facility]. Asked if we could call back on Monday for acceptance. Per office it takes at least 24 hours to receive and process fax.

## 2023-08-18 NOTE — CARE PLAN
Problem: Mobility  Goal: Patient's capacity to carry out activities will improve  Outcome: Progressing  Note: Patient able to perform regular activities this shift.  Pain controlled this shift.  Pain management includes PRN pain meds as well as non-pharmacological measures such as emotional support, rest, and repositioning.  Will continue to monitor.    The patient is Stable - Low risk of patient condition declining or worsening    Shift Goals  Clinical Goals: Safety  Patient Goals: Sleep well  Family Goals: Education    Progress made toward(s) clinical / shift goals:  pt participates with therapy and is cooperative with nursing    Patient is not progressing towards the following goals:

## 2023-08-18 NOTE — PROGRESS NOTES
NURSING DAILY NOTE    Name: Doreen Noe   Date of Admission: 8/4/2023   Admitting Diagnosis: Ischemic stroke (HCC)  Attending Physician: Leona Pleitez M.d.  Allergies: Patient has no known allergies.    Safety  Patient Assist  min  Patient Precautions  Fall Risk  Precaution Comments  R hemiparesis, mild R neglect, hx of seizures and L side CVA, Zio patch  Bed Transfer Status  Contact Guard Assist  Toilet Transfer Status   Contact Guard Assist  Assistive Devices  Wheelchair  Oxygen  None - Room Air  Diet/Therapeutic Dining  Current Diet Order   Procedures    Diet Order Diet: Regular; Tray Modifications (optional): SLP - Deliver to Nursing Station     Pill Administration  whole  Agitated Behavioral Scale     ABS Level of Severity       Fall Risk  Has the patient had a fall this admission?   No  Mariela Rodgers Fall Risk Scoring  16, HIGH RISK  Fall Risk Safety Measures  bed alarm and chair alarm    Vitals  Temperature: 36.2 °C (97.2 °F)  Temp src: Oral  Pulse: 84  Respiration: 18  Blood Pressure : 114/72  Blood Pressure MAP (Calculated): 86 MM HG  BP Location: Left, Upper Arm  Patient BP Position: Sitting     Oxygen  Pulse Oximetry: 96 %  O2 (LPM): 0  O2 Delivery Device: None - Room Air    Bowel and Bladder  Last Bowel Movement  08/17/23  Stool Type  Type 6: Fluffy pieces with ragged edges, a mushy stool  Bowel Device  Bathroom  Continent  Bladder: Continent void   Bowel: Continent movement  Bladder Function  Urine Void (mL):  (large)  Number of Times Voided: 1  Urinary Options: Yes  Urine Color: Yellow  Genitourinary Assessment   Bladder Assessment (WDL):  WDL Except  Engel Catheter: Not Applicable  Urine Color: Yellow  Bladder Device: Bathroom  Bladder Scan: Post Void  $ Bladder Scan Results (mL): 13    Skin  Angus Score   18  Sensory Interventions   Bed Types: Standard/Trauma Mattress  Skin Preventative Measures: Pillows in Use for Support /  Positioning  Moisture Interventions  Moisturizers/Barriers: Barrier Wipes      Pain  Pain Rating Scale  0 - No Pain  Pain Location  Neck  Pain Location Orientation     Pain Interventions   Declines    ADLs    Bathing    (shower with ot)  Linen Change      Personal Hygiene  Moist Preeti Wipes  Chlorhexidine Bath      Oral Care  Brushed Teeth  Teeth/Dentures     Shave     Nutrition Percentage Eaten  *  * Meal *  *, Dinner, Between % Consumed (70%)  Environmental Precautions  Treaded Slipper Socks on Patient, Personal Belongings, Wastebasket, Call Bell etc. in Easy Reach, Transferred to Stronger Side, Report Given to Other Health Care Providers Regarding Fall Risk, Bed in Low Position  Patient Turns/Positioning  Patient Turns Self from Side to Side  Patient Turns Assistance/Tolerance     Bed Positions  Bed Controls On  Head of Bed Elevated  Self regulated      Psychosocial/Neurologic Assessment  Psychosocial Assessment  Psychosocial (WDL):  Within Defined Limits  Neurologic Assessment  Neuro (WDL): Exceptions to WDL  Level of Consciousness: Alert  Orientation Level: Oriented X4  Cognition: Appropriate judgement, Appropriate safety awareness, Appropriate attention/concentration, Appropriate for developmental age, Follows commands  Speech: Clear  Pupil Assesment: No  Motor Function/Sensation Assessment: Dorsiflexion, Motor response, Sensation, Motor strength  R Foot Dorsiflexion: Weak  L Foot Dorsiflexion: Strong  RUE Motor Response: Responds to commands  RUE Sensation: Full sensation  Muscle Strength Right Arm: Good Strength Against Gravity and Moderate Resistance  LUE Motor Response: Tremors, Responds to commands  LUE Sensation: Full sensation  Muscle Strength Left Arm: Normal Strength Against Gravity and Full Resistance  RLE Motor Response: Responds to commands  RLE Sensation: Numbness  Muscle Strength Right Leg: Weak Movement but Not Against Gravity or Resistance  LLE Motor Response: Responds to commands  LLE  Sensation: Full sensation  Muscle Strength Left Leg: Normal Strength Against Gravity and Full Resistance  EENT (WDL):  WDL Except    Cardio/Pulmonary Assessment  Edema      Respiratory Breath Sounds  RUL Breath Sounds: Clear  RML Breath Sounds: Clear  RLL Breath Sounds: Clear  MONIKA Breath Sounds: Clear  LLL Breath Sounds: Clear  Cardiac Assessment   Cardiac (WDL):  WDL Except (hx htn, hld)

## 2023-08-18 NOTE — CARE PLAN
The patient is Stable - Low risk of patient condition declining or worsening    Shift Goals  Clinical Goals: Safety, pain control  Patient Goals: Safety  Family Goals: Education    Progress made toward(s) clinical / shift goals:  Pt's VSS, denies pain, able to participate in therapy, contact guard transfer.

## 2023-08-18 NOTE — PROGRESS NOTES
"Rehab Progress Note     Date of Service: 8/18/2023  Chief Complaint: Follow-up stroke    Interval Events (Subjective)    Patient seen and examined today in the therapy gym.  She just ambulated outside using bilateral walking sticks for stability.  Inside she is able to ambulate with a straight cane.  She denies any pain.  We discussed her blood pressure and need to get a blood pressure cuff at home.  She last moved her bowels yesterday.  Cardiac monitor can be removed today so she can have aqua therapy over the weekend.    Patient has no other new questions, concerns, or complaints today.           Objective:  VITAL SIGNS: /82   Pulse 75   Temp 36.3 °C (97.3 °F) (Temporal)   Resp 16   Ht 1.702 m (5' 7.01\")   Wt 64 kg (141 lb 1.5 oz)   SpO2 97%   BMI 22.09 kg/m²   Gen: alert, no apparent distress  CV: Regular rate, regular rhythm, cardiac monitor in left upper chest wall  Resp: Clear to auscultation bilaterally  Neuro: 4/5 MMT right lower extremity, chronic dyskinesias/tremor in the left hand        No results found for this or any previous visit (from the past 72 hour(s)).        Scheduled Medications   Medication Dose Frequency    losartan  50 mg Q DAY    senna-docusate  2 Tablet BID    omeprazole  20 mg QAM AC    apixaban  5 mg BID    atorvastatin  80 mg Q EVENING    lamoTRIgine  150 mg Q EVENING       Current Diet Order   Procedures    Diet Order Diet: Regular; Tray Modifications (optional): SLP - Deliver to Nursing Station       Assessment:    This patient is a 67 y.o. female admitted for acute inpatient rehabilitation with Ischemic stroke (HCC).      ILeona M.D./MD., was present and led the interdisciplinary team conference on 8/16/2023.  I led the IDT conference and agree with the IDT conference documentation and plan of care as noted below.     Nursing:  Diet Current Diet Order   Procedures    Diet Order Diet: Regular; Tray Modifications (optional): SLP - Deliver to Nursing Station "       Eating ADL Supervision  Increased time   % of Last Meal  Oral Nutrition: *  * Meal *  *, Lunch, Between % Consumed (80%)   Sleep No issues   Bowel Last BM: 08/15/23   Bladder Post Void  13   Barriers to Discharge Home: none      Physical Therapy:  Bed Mobility    Transfers Contact Guard Assist  Adaptive equipment, Increased time, Set-up of equipment, Supervision for safety (stand pivot from w/c to bed)   Mobility Contact Guard Assist   Stairs Standby Assist   Barriers to Discharge Home: right sided weakness, impaired balance      Occupational Therapy:  Grooming Modified Independent, Seated   Bathing Contact Guard Assist (last assessed 8/10)   UB Dressing Supervision (last assessed 8/10)   LB Dressing Minimal Assist (last assessed 8/10)   Toileting Standby Assist   Shower & Tub Transfer Contact Guard Assist (last assessed 8/10)   Barriers to Discharge Home: right sided weakness      Speech-Language Pathology:  Comprehension:  Modified Independent  Comprehension Description:  Glasses  Expression:  Independent  Expression Description:     Social Interaction:  Independent  Social Interaction Description:     Problem Solving:  Modified Independent  Problem Solving Description:  Increased time, Supervision, Therapy schedule, Verbal cueing  Memory:  Supervision  Memory Description:  Increased time, Supervision, Therapy schedule, Verbal cueing  Barriers to Discharge Home: none    Respiratory Therapy:  O2 (LPM): 0  O2 Delivery Device: None - Room Air    Case Management:  Continues to work on disposition and DME needs.      Discharge Date/Disposition:    8/22    Outpatient PT/OT    Equip: SPC    Follow-ups: PCP, stroke bridge, cardiology, neurology       Problem List/Medical Decision Making and Plan:      L CAROL ANN stroke  Right hemiparesis, improved  Right neglect, improved  Mild cognitive impairment   PT/OT, 1.5 hr each discipline, 5 days per week  8/10: SLP picked up for 30 min, share other 30 min with OT/PT  Aqua  therapy ordered -to start after Zio patch is removed  Recreational therapy ordered    Continue Zio patch, return today    Continue Eliquis and atorvastatin    Outpatient follow-up with stroke Bridge clinic, cardiology, referrals made    Right spasticity  Currently very mild and only at night  Continue to monitor    Seizure disorder  Last seizure November 2022  Generalized tonic-clonic  Outpatient follow-up with a provider in [place]  Continue lamotrigine     Tremor, left-sided  History of right MCA stroke  Outpatient referral to movement disorder as needed     Hypertension, resolved  Continue losartan  Continue hydralazine as needed, not requiring  Patient advised to get a blood pressure cuff for home monitoring    Rash, resolved  Looks like rosacea  Given patient handout information  Outpatient follow-up with dermatology as needed     Hyperlipidemia  Continue atorvastatin    GI prophylaxis  Continue omeprazole    DVT prophylaxis  Continue Eliquis    Leona Pleitez M.D.  Physical Medicine and Rehabilitation

## 2023-08-19 ENCOUNTER — APPOINTMENT (OUTPATIENT)
Dept: PHYSICAL THERAPY | Facility: REHABILITATION | Age: 68
DRG: 057 | End: 2023-08-19
Attending: PHYSICAL MEDICINE & REHABILITATION
Payer: COMMERCIAL

## 2023-08-19 ENCOUNTER — APPOINTMENT (OUTPATIENT)
Dept: OCCUPATIONAL THERAPY | Facility: REHABILITATION | Age: 68
DRG: 057 | End: 2023-08-19
Attending: PHYSICAL MEDICINE & REHABILITATION
Payer: COMMERCIAL

## 2023-08-19 PROCEDURE — 700102 HCHG RX REV CODE 250 W/ 637 OVERRIDE(OP): Performed by: PHYSICAL MEDICINE & REHABILITATION

## 2023-08-19 PROCEDURE — 97116 GAIT TRAINING THERAPY: CPT

## 2023-08-19 PROCEDURE — 770010 HCHG ROOM/CARE - REHAB SEMI PRIVAT*

## 2023-08-19 PROCEDURE — A9270 NON-COVERED ITEM OR SERVICE: HCPCS | Performed by: PHYSICAL MEDICINE & REHABILITATION

## 2023-08-19 PROCEDURE — 97535 SELF CARE MNGMENT TRAINING: CPT

## 2023-08-19 RX ADMIN — LOSARTAN POTASSIUM 50 MG: 50 TABLET, FILM COATED ORAL at 05:17

## 2023-08-19 RX ADMIN — LAMOTRIGINE 150 MG: 100 TABLET ORAL at 21:55

## 2023-08-19 RX ADMIN — APIXABAN 5 MG: 5 TABLET, FILM COATED ORAL at 08:43

## 2023-08-19 RX ADMIN — ATORVASTATIN CALCIUM 80 MG: 40 TABLET, FILM COATED ORAL at 21:55

## 2023-08-19 RX ADMIN — OMEPRAZOLE 20 MG: 20 CAPSULE, DELAYED RELEASE ORAL at 08:43

## 2023-08-19 RX ADMIN — APIXABAN 5 MG: 5 TABLET, FILM COATED ORAL at 21:55

## 2023-08-19 ASSESSMENT — GAIT ASSESSMENTS
DISTANCE (FEET): 850
ASSISTIVE DEVICE: SINGLE POINT CANE
GAIT LEVEL OF ASSIST: CONTACT GUARD ASSIST
DEVIATION: DECREASED BASE OF SUPPORT;BRADYKINETIC;DECREASED HEEL STRIKE;DECREASED TOE OFF

## 2023-08-19 ASSESSMENT — PAIN DESCRIPTION - PAIN TYPE
TYPE: ACUTE PAIN
TYPE: ACUTE PAIN

## 2023-08-19 NOTE — PROGRESS NOTES
NURSING DAILY NOTE    Name: Doreen Noe   Date of Admission: 8/4/2023   Admitting Diagnosis: Ischemic stroke (HCC)  Attending Physician: Leona Pleitez M.d.  Allergies: Patient has no known allergies.    Safety  Patient Assist  min  Patient Precautions  Fall Risk  Precaution Comments  R hemiparesis, hx of seizures and L side CVA, Zio patch  Bed Transfer Status  Contact Guard Assist  Toilet Transfer Status   Contact Guard Assist  Assistive Devices  Rails, Wheelchair  Oxygen  None - Room Air  Diet/Therapeutic Dining  Current Diet Order   Procedures    Diet Order Diet: Regular; Tray Modifications (optional): SLP - Deliver to Nursing Station     Pill Administration  whole and one at a time   Agitated Behavioral Scale     ABS Level of Severity       Fall Risk  Has the patient had a fall this admission?   No  Mariela Rodgers Fall Risk Scoring  16, HIGH RISK  Fall Risk Safety Measures  bed alarm, chair alarm, and seatbelt alarm    Vitals  Temperature: 36.9 °C (98.4 °F)  Temp src: Temporal  Pulse: 73  Respiration: 18  Blood Pressure : 131/68  Blood Pressure MAP (Calculated): 89 MM HG  BP Location: Left, Upper Arm  Patient BP Position: Sitting     Oxygen  Pulse Oximetry: 94 %  O2 (LPM): 0  O2 Delivery Device: None - Room Air    Bowel and Bladder  Last Bowel Movement  08/17/23  Stool Type  Type 6: Fluffy pieces with ragged edges, a mushy stool  Bowel Device  Bathroom  Continent  Bladder: Continent void   Bowel: Continent movement  Bladder Function  Urine Void (mL):  (large)  Number of Times Voided: 1  Urinary Options: Yes  Urine Color: Yellow  Genitourinary Assessment   Bladder Assessment (WDL):  WDL Except  Engel Catheter: Not Applicable  Urine Color: Yellow  Bladder Device: Bathroom  Bladder Scan: Post Void  $ Bladder Scan Results (mL): 13    Skin  Angus Score   18  Sensory Interventions   Bed Types: Standard/Trauma Mattress  Skin Preventative Measures:  Pillows in Use for Support / Positioning  Moisture Interventions  Moisturizers/Barriers: Barrier Wipes      Pain  Pain Rating Scale  0 - No Pain  Pain Location  Neck  Pain Location Orientation     Pain Interventions   Declines    ADLs    Bathing    (shower with ot)  Linen Change      Personal Hygiene  Moist Preeti Wipes  Chlorhexidine Bath      Oral Care  Brushed Teeth  Teeth/Dentures     Shave     Nutrition Percentage Eaten  Lunch, Between % Consumed (100%)  Environmental Precautions  Treaded Slipper Socks on Patient, Personal Belongings, Wastebasket, Call Bell etc. in Easy Reach, Transferred to Stronger Side, Report Given to Other Health Care Providers Regarding Fall Risk, Bed in Low Position  Patient Turns/Positioning  Patient Turns Self from Side to Side  Patient Turns Assistance/Tolerance     Bed Positions  Bed Controls On  Head of Bed Elevated  Self regulated      Psychosocial/Neurologic Assessment  Psychosocial Assessment  Psychosocial (WDL):  Within Defined Limits  Neurologic Assessment  Neuro (WDL): Exceptions to WDL  Level of Consciousness: Alert  Orientation Level: Oriented X4  Cognition: Appropriate judgement, Appropriate safety awareness, Appropriate attention/concentration, Appropriate for developmental age, Follows commands  Speech: Clear  Pupil Assesment: No  Motor Function/Sensation Assessment: Dorsiflexion, Motor response, Sensation, Motor strength  R Foot Dorsiflexion: Weak  L Foot Dorsiflexion: Strong  RUE Motor Response: Responds to commands  RUE Sensation: Full sensation  Muscle Strength Right Arm: Good Strength Against Gravity and Moderate Resistance  LUE Motor Response: Tremors, Responds to commands  LUE Sensation: Full sensation  Muscle Strength Left Arm: Normal Strength Against Gravity and Full Resistance  RLE Motor Response: Responds to commands  RLE Sensation: Numbness  Muscle Strength Right Leg: Weak Movement but Not Against Gravity or Resistance  LLE Motor Response: Responds to  commands  LLE Sensation: Full sensation  Muscle Strength Left Leg: Normal Strength Against Gravity and Full Resistance  EENT (WDL):  WDL Except    Cardio/Pulmonary Assessment  Edema      Respiratory Breath Sounds  RUL Breath Sounds: Clear  RML Breath Sounds: Clear  RLL Breath Sounds: Clear  MONIKA Breath Sounds: Clear  LLL Breath Sounds: Clear  Cardiac Assessment   Cardiac (WDL):  WDL Except (hx htn, hld)

## 2023-08-19 NOTE — CARE PLAN
The patient is Stable - Low risk of patient condition declining or worsening    Shift Goals  Clinical Goals: Safety, pain control  Patient Goals: Safety  Family Goals: Education    Progress made toward(s) clinical / shift goals:  VSS, pt denies pain, able to participate in therapy on RA.

## 2023-08-19 NOTE — CARE PLAN
The patient is Stable - Low risk of patient condition declining or worsening      Problem: Pain - Standard  Goal: Alleviation of pain or a reduction in pain to the patient’s comfort goal  Outcome: Progressing   Note:Patient denies pain or discomfort this shift and no s/s same noted.  Will continue to monitor.       Problem: Skin Integrity  Goal: Patient's skin integrity will be maintained or improve  Outcome: Progressing   Note: Patient's skin remains intact and free from new or accidental injury this shift.  Will continue to monitor.

## 2023-08-19 NOTE — THERAPY
Occupational Therapy  Daily Treatment     Patient Name: Doreen Noe  Age:  67 y.o., Sex:  female  Medical Record #: [id number]  Today's Date: 8/19/2023     Precautions  Precautions: (P) Fall Risk  Comments: (P) R hemiparesis, hx of seizures and L side CVA, Zio patch    Subjective    Received patient seated in gym following PT family training. DIL and son present for OT family training.      Objective     08/19/23 1401   OT Charge Group   OT Self Care / ADL (Units) 2   OT Total Time Spent   OT Individual Total Time Spent (Mins) 30   Precautions   Precautions Fall Risk   Comments R hemiparesis, hx of seizures and L side CVA, Zio patch   Vitals   O2 Delivery Device None - Room Air   Functional Level of Assist   Tub / Shower Transfers Contact Guard Assist  (dry tub/shower txfr w/ tub bench)   Interdisciplinary Plan of Care Collaboration   IDT Collaboration with  Family / Caregiver   Patient Position at End of Therapy Seated   Collaboration Comments son/DIL present for family training     Family training completed w/ focus on ADL safety/independence. Reviewed safety recommendations and adaptive techniques w/ emphasis on shower safety. Patient completed dry tub/shower txfr @ CGA level using tub/txfr bench to simulate home setup.     Assessment    OT session focused on family training w/ son and DIL. Patient and family verbalized understanding re: DC recommendations including tub txfr bench, installation of GBs, hand held shower head and non skid shower tish. Family verbalized understanding that supervision recommended for showers initially and at least daily check ins due to balance impairment. Family also stated they can cont caring for patient's dog at this time to maximize safety/fall prevention.     Plan    Family requested HHOT vs OPOT, CM/IDT notified    DC IRF [facility] on Monday in anticipation of DC Tuesday, standing balance    Occupational Therapy Goals (Active)       Problem: Dressing       Dates: Start:   08/05/23         Goal: STG-Within one week, patient will dress UB at a Mod I level.        Dates: Start:  08/05/23         Goal Note filed on 08/16/23 1042 by April Bowens OT       SPV                 Problem: OT Long Term Goals       Dates: Start:  08/05/23         Goal: LTG-By discharge, patient will complete basic self care tasks at a SUP/Mod I level with AE/AD/DME PRN.        Dates: Start:  08/05/23            Goal: LTG-By discharge, patient will perform bathroom transfers at a SUP/Mod I level with AE/AD/DME PRN.        Dates: Start:  08/05/23            Goal: LTG-By discharge, patient will complete basic home management at a Min A to Mod I level with LRD and AE PRN.        Dates: Start:  08/05/23

## 2023-08-19 NOTE — PROGRESS NOTES
NURSING DAILY NOTE    Name: Doreen Noe   Date of Admission: 8/4/2023   Admitting Diagnosis: Ischemic stroke (HCC)  Attending Physician: Leona Pleitez M.d.  Allergies: Patient has no known allergies.    Safety  Patient Assist  min  Patient Precautions  Fall Risk  Precaution Comments  R hemiparesis, hx of seizures and L side CVA, Zio patch  Bed Transfer Status  Contact Guard Assist  Toilet Transfer Status   Contact Guard Assist  Assistive Devices  Rails, Wheelchair  Oxygen  None - Room Air  Diet/Therapeutic Dining  Current Diet Order   Procedures    Diet Order Diet: Regular; Tray Modifications (optional): SLP - Deliver to Nursing Station     Pill Administration  whole  Agitated Behavioral Scale     ABS Level of Severity       Fall Risk  Has the patient had a fall this admission?   No  Mariela Rodgers Fall Risk Scoring  16, HIGH RISK  Fall Risk Safety Measures  chair alarm, poor balance, and low vision/ hearing    Vitals  Temperature: 36.3 °C (97.3 °F)  Temp src: Temporal  Pulse: 79  Respiration: 15  Blood Pressure : 118/73  Blood Pressure MAP (Calculated): 88 MM HG  BP Location: Left, Upper Arm  Patient BP Position: Supine     Oxygen  Pulse Oximetry: 93 %  O2 (LPM): 0  O2 Delivery Device: None - Room Air    Bowel and Bladder  Last Bowel Movement  08/17/23  Stool Type  Type 6: Fluffy pieces with ragged edges, a mushy stool  Bowel Device  Bathroom  Continent  Bladder: Continent void   Bowel: Continent movement  Bladder Function  Urine Void (mL):  (large)  Number of Times Voided: 1  Urinary Options: Yes  Urine Color: Yellow  Genitourinary Assessment   Bladder Assessment (WDL):  WDL Except  Engel Catheter: Not Applicable  Urine Color: Yellow  Bladder Device: Bathroom  Bladder Scan: Post Void  $ Bladder Scan Results (mL): 13    Skin  Angus Score   18  Sensory Interventions   Bed Types: Standard/Trauma Mattress  Skin Preventative Measures: Pillows in Use for  Support / Positioning  Moisture Interventions  Moisturizers/Barriers: Barrier Wipes      Pain  Pain Rating Scale  0 - No Pain  Pain Location  Neck  Pain Location Orientation     Pain Interventions   Declines    ADLs    Bathing    (shower with ot)  Linen Change      Personal Hygiene  Moist Preeti Wipes  Chlorhexidine Bath      Oral Care  Brushed Teeth  Teeth/Dentures     Shave     Nutrition Percentage Eaten  *  * Meal *  *, Breakfast, Between % Consumed  Environmental Precautions  Treaded Slipper Socks on Patient, Personal Belongings, Wastebasket, Call Bell etc. in Easy Reach, Transferred to Stronger Side, Report Given to Other Health Care Providers Regarding Fall Risk, Bed in Low Position  Patient Turns/Positioning  Patient Turns Self from Side to Side  Patient Turns Assistance/Tolerance  * * Assistance, * * Tolerance  Bed Positions  Bed Controls On  Head of Bed Elevated  Self regulated      Psychosocial/Neurologic Assessment  Psychosocial Assessment  Psychosocial (WDL):  Within Defined Limits  Neurologic Assessment  Neuro (WDL): Exceptions to WDL  Level of Consciousness: Alert  Orientation Level: Oriented X4  Cognition: Appropriate judgement, Appropriate safety awareness, Appropriate attention/concentration, Appropriate for developmental age, Follows commands  Speech: Clear  Pupil Assesment: No  Motor Function/Sensation Assessment: Dorsiflexion, Motor response, Sensation, Motor strength  R Foot Dorsiflexion: Weak  L Foot Dorsiflexion: Strong  RUE Motor Response: Responds to commands  RUE Sensation: Full sensation  Muscle Strength Right Arm: Good Strength Against Gravity and Moderate Resistance  LUE Motor Response: Tremors, Responds to commands  LUE Sensation: Full sensation  Muscle Strength Left Arm: Normal Strength Against Gravity and Full Resistance  RLE Motor Response: Responds to commands  RLE Sensation: Numbness  Muscle Strength Right Leg: Weak Movement but Not Against Gravity or Resistance  LLE Motor  Response: Responds to commands  LLE Sensation: Full sensation  Muscle Strength Left Leg: Normal Strength Against Gravity and Full Resistance  EENT (WDL):  WDL Except    Cardio/Pulmonary Assessment  Edema      Respiratory Breath Sounds  RUL Breath Sounds: Clear  RML Breath Sounds: Clear  RLL Breath Sounds: Clear  MONIKA Breath Sounds: Clear  LLL Breath Sounds: Clear  Cardiac Assessment   Cardiac (WDL):  WDL Except (hx htn, hld)

## 2023-08-19 NOTE — THERAPY
"Physical Therapy   Daily Treatment     Patient Name: Doreen Noe  Age:  67 y.o., Sex:  female  Medical Record #: [id number]  Today's Date: 8/19/2023     Precautions  Precautions: (P) Fall Risk  Comments: (P) R hemiparesis, hx of seizures and L side CVA, Zio patch    Subjective    Patient is found seated up in wheelchair, is agreeable to participate. Willing to work on walking with head turns \"I've mostly been working on walking and paying attention to my feet and hands\"     Objective       08/19/23 1431   PT Charge Group   PT Gait Training (Units) 2   PT Total Time Spent   PT Individual Total Time Spent (Mins) 30   Gait Functional Level of Assist    Gait Level Of Assist Contact Guard Assist   Assistive Device Single Point Cane   Distance (Feet) 850   # of Times Distance was Traveled 1   Deviation Decreased Base Of Support;Bradykinetic;Decreased Heel Strike;Decreased Toe Off  (lateral losses of balance with head turns, slow gait speed with head turns, meandering gait path with head turns)   Interdisciplinary Plan of Care Collaboration   IDT Collaboration with  Occupational Therapist   Patient Position at End of Therapy Seated;Family / Friend in Room   Collaboration Comments in gym for OT session     Ambulated in halls to find all the pictures with animals in them, then circled the building again to find the pictures with buildings. Had one near loss of balance when cued to look over left shoulder to identify a picture she missed, was able to recover with min assist at gait belt and stepping response.     Assessment    Patient requires cues to attend to pictures in the byrd as she fatigues, ambulates while turning head but keeping eyes fixed on distant target. Gait speed slows, gait path meanders, has lateral near losses of balance requiring stepping response when ambulating with head turns. Requires cues to try to keep speed consistent. She is a fall risk; I suspect patient may be less of a fall risk in a familiar " environment, recommend she have regular check ins from family if the plan continues to be d/c to home alone.     Strengths: Able to follow instructions, Effective communication skills, Good insight into deficits/needs, Independent prior level of function, Pleasant and cooperative, Motivated for self care and independence  Barriers: Decreased endurance, Fatigue, Generalized weakness, Hemiparesis, Home accessibility, Impaired activity tolerance, Impaired balance, Spasticity    Plan    Gait training with SPC - incorporate head turns/visual disturbances/busy environments for balance and attention training, sit<>stands with emphasis on sequencing/safety/avoiding retropulsion, continue treadmill training, stairs (lives in [facility] 2SH 5 DENZEL [facility]), reactive standing balance, mat program, RLE and trunk NRE, RLE forced use, txfer training SPC, bed mobility, R sided awareness, floor recovery. Nustep adjunct c/ techs 2-3x/wk as able. Vector profile in [facility]. Aquatic therapy on Sun 8/20.       Passport items to be completed:  Get in/out of bed safely, in/out of a vehicle, safely use mobility device, walk or wheel around home/community, navigate up and down stairs, show how to get up/down from the ground, ensure home is accessible, demonstrate HEP, complete caregiver training    Physical Therapy Problems (Active)       Problem: PT-Long Term Goals       Dates: Start:  08/05/23         Goal: LTG-By discharge, patient will ambulate 75' with FWW and SBA       Dates: Start:  08/05/23            Goal: LTG-By discharge, patient will transfer one surface to another with FWW independently.        Dates: Start:  08/05/23            Goal: LTG-By discharge, patient will ambulate up/down 4-6 stairs with B handrails and SBA       Dates: Start:  08/05/23            Goal: LTG-By discharge, patient will transfer in/out of a car with FWW and supervision.        Dates: Start:  08/05/23

## 2023-08-20 ENCOUNTER — APPOINTMENT (OUTPATIENT)
Dept: OCCUPATIONAL THERAPY | Facility: REHABILITATION | Age: 68
DRG: 057 | End: 2023-08-20
Attending: PHYSICAL MEDICINE & REHABILITATION
Payer: COMMERCIAL

## 2023-08-20 ENCOUNTER — APPOINTMENT (OUTPATIENT)
Dept: PHYSICAL THERAPY | Facility: REHABILITATION | Age: 68
DRG: 057 | End: 2023-08-20
Attending: PHYSICAL MEDICINE & REHABILITATION
Payer: COMMERCIAL

## 2023-08-20 PROCEDURE — 700102 HCHG RX REV CODE 250 W/ 637 OVERRIDE(OP): Performed by: PHYSICAL MEDICINE & REHABILITATION

## 2023-08-20 PROCEDURE — 97530 THERAPEUTIC ACTIVITIES: CPT

## 2023-08-20 PROCEDURE — 97113 AQUATIC THERAPY/EXERCISES: CPT

## 2023-08-20 PROCEDURE — A9270 NON-COVERED ITEM OR SERVICE: HCPCS | Performed by: PHYSICAL MEDICINE & REHABILITATION

## 2023-08-20 PROCEDURE — 770010 HCHG ROOM/CARE - REHAB SEMI PRIVAT*

## 2023-08-20 PROCEDURE — 97535 SELF CARE MNGMENT TRAINING: CPT

## 2023-08-20 RX ADMIN — APIXABAN 5 MG: 5 TABLET, FILM COATED ORAL at 09:15

## 2023-08-20 RX ADMIN — ATORVASTATIN CALCIUM 80 MG: 40 TABLET, FILM COATED ORAL at 20:24

## 2023-08-20 RX ADMIN — APIXABAN 5 MG: 5 TABLET, FILM COATED ORAL at 20:25

## 2023-08-20 RX ADMIN — OMEPRAZOLE 20 MG: 20 CAPSULE, DELAYED RELEASE ORAL at 09:15

## 2023-08-20 RX ADMIN — LAMOTRIGINE 150 MG: 100 TABLET ORAL at 20:24

## 2023-08-20 RX ADMIN — LOSARTAN POTASSIUM 50 MG: 50 TABLET, FILM COATED ORAL at 05:45

## 2023-08-20 ASSESSMENT — ACTIVITIES OF DAILY LIVING (ADL): TOILET_TRANSFER_DESCRIPTION: GRAB BAR;INCREASED TIME;INITIAL PREPARATION FOR TASK;SUPERVISION FOR SAFETY

## 2023-08-20 ASSESSMENT — PAIN DESCRIPTION - PAIN TYPE
TYPE: ACUTE PAIN
TYPE: ACUTE PAIN

## 2023-08-20 NOTE — PROGRESS NOTES
Assumed care of patient. Bedside report received from Rica VASQUEZ. Patient is in bed. Fall precautions in place. Call light and belongings within reach. Updated on POC, all questions and concerns answered at this time. No other needs at this time.

## 2023-08-20 NOTE — PROGRESS NOTES
NURSING DAILY NOTE    Name: Doreen Noe   Date of Admission: 8/4/2023   Admitting Diagnosis: Ischemic stroke (HCC)  Attending Physician: Leona Pleitez M.d.  Allergies: Patient has no known allergies.    Safety  Patient Assist  sba  Patient Precautions  Fall Risk  Precaution Comments  R hemiparesis, hx of seizures and L side CVA, Zio patch  Bed Transfer Status  Contact Guard Assist  Toilet Transfer Status   Contact Guard Assist  Assistive Devices  Rails, Wheelchair  Oxygen  None - Room Air  Diet/Therapeutic Dining  Current Diet Order   Procedures    Diet Order Diet: Regular; Tray Modifications (optional): SLP - Deliver to Nursing Station     Pill Administration  whole and one at a time   Agitated Behavioral Scale     ABS Level of Severity       Fall Risk  Has the patient had a fall this admission?   No  Mariela Rodgers Fall Risk Scoring  16, HIGH RISK  Fall Risk Safety Measures  bed alarm, chair alarm, and seatbelt alarm    Vitals  Temperature: 36.9 °C (98.4 °F)  Temp src: Oral  Pulse: 74  Respiration: 18  Blood Pressure : 136/85  Blood Pressure MAP (Calculated): 102 MM HG  BP Location: Left, Upper Arm  Patient BP Position: Supine     Oxygen  Pulse Oximetry: 92 %  O2 (LPM): 0  O2 Delivery Device: None - Room Air    Bowel and Bladder  Last Bowel Movement  08/18/23 (per pt)  Stool Type  Type 6: Fluffy pieces with ragged edges, a mushy stool  Bowel Device  Bathroom  Continent  Bladder: Continent void   Bowel: Continent movement  Bladder Function  Urine Void (mL):  (moderate)  Number of Times Voided: 2  Urinary Options: Yes  Urine Color: Yellow  Genitourinary Assessment   Bladder Assessment (WDL):  WDL Except  Engel Catheter: Not Applicable  Urine Color: Yellow  Bladder Device: Bathroom  Bladder Scan: Post Void  $ Bladder Scan Results (mL): 13    Skin  Angus Score   18  Sensory Interventions   Bed Types: Standard/Trauma Mattress  Skin Preventative  Measures: Pillows in Use for Support / Positioning  Moisture Interventions  Moisturizers/Barriers: Barrier Cream      Pain  Pain Rating Scale  0 - No Pain  Pain Location  Neck  Pain Location Orientation     Pain Interventions   Declines    ADLs    Bathing    (shower done in AM shift)  Linen Change   Complete  Personal Hygiene  Moist Preeti Wipes, Perineal Care  Chlorhexidine Bath      Oral Care  Brushed Teeth  Teeth/Dentures     Shave     Nutrition Percentage Eaten  Dinner, Between 50-75% Consumed  Environmental Precautions  Bed in Low Position, Treaded Slipper Socks on Patient  Patient Turns/Positioning  Patient Turns Self from Side to Side  Patient Turns Assistance/Tolerance  * * Assistance, * * Tolerance  Bed Positions  Bed Controls On  Head of Bed Elevated  Self regulated      Psychosocial/Neurologic Assessment  Psychosocial Assessment  Psychosocial (WDL):  Within Defined Limits  Neurologic Assessment  Neuro (WDL): Exceptions to WDL  Level of Consciousness: Alert  Orientation Level: Oriented X4  Cognition: Appropriate judgement, Appropriate safety awareness, Appropriate attention/concentration, Appropriate for developmental age, Follows commands  Speech: Clear  Pupil Assesment: No  Motor Function/Sensation Assessment: Dorsiflexion, Motor response, Sensation, Motor strength  R Foot Dorsiflexion: Weak  L Foot Dorsiflexion: Strong  RUE Motor Response: Responds to commands  RUE Sensation: Full sensation  Muscle Strength Right Arm: Good Strength Against Gravity and Moderate Resistance  LUE Motor Response: Tremors, Responds to commands  LUE Sensation: Full sensation  Muscle Strength Left Arm: Normal Strength Against Gravity and Full Resistance  RLE Motor Response: Responds to commands  RLE Sensation: Numbness  Muscle Strength Right Leg: Weak Movement but Not Against Gravity or Resistance  LLE Motor Response: Responds to commands  LLE Sensation: Full sensation  Muscle Strength Left Leg: Normal Strength Against Gravity  and Full Resistance  EENT (WDL):  WDL Except    Cardio/Pulmonary Assessment  Edema      Respiratory Breath Sounds  RUL Breath Sounds: Clear  RML Breath Sounds: Clear  RLL Breath Sounds: Clear  MONIKA Breath Sounds: Clear  LLL Breath Sounds: Clear  Cardiac Assessment   Cardiac (WDL):  WDL Except

## 2023-08-20 NOTE — THERAPY
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 and then my stroke happened Tuesday.\" \"It was really weird at the end of the night when I was sorting the cards and putting things away, my hands were not working well and I thought something was off, but then the next day I was fine and went for a couple of walks and then Tuesday is when I wasn't able to stand up or get out of bed.\"      Objective       08/20/23 1001   OT Charge Group   OT Self Care / ADL (Units) 2   OT Therapy Activity (Units) 2   OT Total Time Spent   OT Individual Total Time Spent (Mins) 60   Cognition    Level of Consciousness Alert   Functional Level of Assist   Grooming Modified Independent   Grooming Description Increased time;Seated in wheelchair at sink  (apply face cream, brush teeth and hair)   Bathing Standby Assist   Bathing Description Grab bar;Hand held shower;Tub bench;Increased time;Initial preparation for task;Set-up of equipment;Supervision for safety;Verbal cueing  (cues for sitting during LB bathing tasks for safety)   Upper Body Dressing Modified Independent   Upper Body Dressing Description Increased time  (doff/don pull over shirt and bra seated in w/c)   Lower Body Dressing Contact Guard Assist   Lower Body Dressing Description Grab bar;Increased time;Initial preparation for task;Set-up of equipment;Supervision for safety;Verbal cueing  (cues needed to have pt sit during LB- attempting to don/doff underwear and shorts in standing holding on with 1 hand to grab bar)   Toileting Standby Assist   Toilet Transfers Standby Assist   Toilet Transfer Description Grab bar;Increased time;Initial preparation for " task;Supervision for safety   Tub / Shower Transfers Contact Guard Assist   Tub Shower Transfer Description Grab bar;Shower bench;Initial preparation for task;Increased time;Set-up of equipment;Supervision for safety   Fine Motor / Dexterity    Fine Motor / Dexterity Interventions Pt engaged in FMC exercises on tabletop with opening/closing various pill bottles of big/small sizes; threading medium beads onto string; picking up 10 small beads and then translating to palm and then back to fingre tips 1 at a time; stacking poker chips   IADL Treatments   IADL Treatments Home management   Meal Preparation Pt completed laundry task- putting clothing in to washer, turning knobs and putting soap in washer with SBA- cues for how to turn knobs and how much soap to use   Interdisciplinary Plan of Care Collaboration   Patient Position at End of Therapy Seated;Chair Alarm On;Self Releasing Lap Belt Applied  (pt wheeled herself to lunch)     Pt reported that she has a TTB coming, grab bars and a hand-held shower head.     Assessment    Pt tolerated session well. Pt requesting a shower this session 2/2 having pool therapy later today. Pt completed all bathing/dressing tasks at supervision-CGA level. Pt requires cues for safety to sit rather than stand during LB bathing and dressing tasks. Pt engaged in FMC/dexterity tasks at tabletop. Noted no difficulty with threading medium beads or opening/closing pill bottles. Pt dropping 2/10 small beads when attempting to translate them from palm to finger tips. Pt completed laundry task in standing using washer only with SBA.        Plan    Family requested HHOT vs OPOT, CM/IDT notified     DC IRF [facility] on Monday in anticipation of DC Tuesday, standing balance    Occupational Therapy Goals (Active)       Problem: Dressing       Dates: Start:  08/05/23         Goal: STG-Within one week, patient will dress UB at a Mod I level.        Dates: Start:  08/05/23         Goal Note filed on 08/16/23  1042 by April Bowens, OT       SPV                 Problem: OT Long Term Goals       Dates: Start:  08/05/23         Goal: LTG-By discharge, patient will complete basic self care tasks at a SUP/Mod I level with AE/AD/DME PRN.        Dates: Start:  08/05/23            Goal: LTG-By discharge, patient will perform bathroom transfers at a SUP/Mod I level with AE/AD/DME PRN.        Dates: Start:  08/05/23            Goal: LTG-By discharge, patient will complete basic home management at a Min A to Mod I level with LRD and AE PRN.        Dates: Start:  08/05/23

## 2023-08-20 NOTE — CARE PLAN
The patient is Stable - Low risk of patient condition declining or worsening    Shift Goals  Clinical Goals: Safety  Patient Goals: sleep  Family Goals: Education    Progress made toward(s) clinical / shift goals:    Problem: Knowledge Deficit - Standard  Goal: Patient and family/care givers will demonstrate understanding of plan of care, disease process/condition, diagnostic tests and medications  Outcome: Progressing   Patient educated on the POC and medications administered. All questions and concerns addressed at this time.   Problem: Pain - Standard  Goal: Alleviation of pain or a reduction in pain to the patient’s comfort goal  Outcome: Progressing   Use of 0-10 pain scale used. Patient is able to verbalize pain and discomfort appropriately.   Problem: Fall Risk - Rehab  Goal: Patient will remain free from falls  Outcome: Progressing   Patient calls appropriately for assistance     Patient is not progressing towards the following goals:

## 2023-08-20 NOTE — THERAPY
Physical Therapy   Daily Treatment     Patient Name: Doreen Noe  Age:  67 y.o., Sex:  female  Medical Record #: [id number]  Today's Date: 8/20/2023     Precautions  Precautions: Fall Risk  Comments: R hemiparesis, hx of seizures and L side CVA, Zio patch    Subjective    Pt received up in wc at door to pool room, agreeable to participate in aquatic therapy. Reported that her SO Michael will live with pt for first few days after d/c home.     Objective       08/20/23 1401   PT Charge Group   PT Aquatic Therapy  4   PT Total Time Spent   PT Individual Total Time Spent (Mins) 60   Interdisciplinary Plan of Care Collaboration   IDT Collaboration with  Family / Caregiver   Patient Position at End of Therapy Seated;Chair Alarm On;Family / Friend in Room;Other (Comments)  (sister wheeling pt back to room from pool)   Collaboration Comments pt received from 3 family members, sister observed session     Pt participated in aquatic therapy with 25% weightbearing submerged to sternum.     Pt amb with SBA-min A and no UE support. Patient amb forw across length of pool x2 times, bwd across length of pool x2 times, and sidestepping across length of pool x1 time ea direction. With the most difficulty c/ sidestepping to the L>R as evidenced by multiple LOB posteriorly. Exercises included:  Static standing x30-60 sec at a time - benefited from verbal cues to weight shift anteriorly  Progressed to static standing x30-60 sec at a time against whirlpool effect created by therapist for additional balance challenge  Standing shoulder horiz abd/add at water surface level and shoulder flexion/ext c/ BUE holding aqua paddles approx x30 reps ea  Seated exercises including bicycles and flutter kicks 1 min x2 ea  Standing lateral step outs/ins approx x15 ea direction    Pt entered and exited the pool via stairs with CGA.     Assessment    Pt tolerated therapy session well focused on buoyancy assisted amb c/ no UE support plus static  progressing to dynamic standing balance. Pt c/ worse than expected balance upon first entering the pool c/ multiple LOB requiring up to min A to correct despite intact LLE stepping strategy. However pt showed improvement after balance activities targeted to facilitate anterior weight shift. Pt will benefit from aquatic therapy to enhance overall functional outcome with overground amb and balance to dec fall risk upon d/c home.     Strengths: Able to follow instructions, Effective communication skills, Good insight into deficits/needs, Independent prior level of function, Pleasant and cooperative, Motivated for self care and independence  Barriers: Decreased endurance, Fatigue, Generalized weakness, Hemiparesis, Home accessibility, Impaired activity tolerance, Impaired balance, Spasticity    Plan    Collect d/c irf iqra for anticipated d/c to home on 8/22/23 with SO and family support     Gait training with SPC - incorporate head turns/visual disturbances/busy environments for balance and attention training, sit<>stands with emphasis on sequencing/safety/avoiding retropulsion, continue treadmill training, stairs (lives in [facility] 2SH 5 DENZEL R[facility]), reactive standing balance, mat program, RLE and trunk NRE, RLE forced use, txfer training SPC, bed mobility, R sided awareness, floor recovery. Nustep adjunct c/ techs 2-3x/wk as able. Vector profile in [facility].        Passport items to be completed:  Get in/out of bed safely, in/out of a vehicle, safely use mobility device, walk or wheel around home/community, navigate up and down stairs, show how to get up/down from the ground, ensure home is accessible, demonstrate HEP, complete caregiver training    Physical Therapy Problems (Active)       Problem: PT-Long Term Goals       Dates: Start:  08/05/23         Goal: LTG-By discharge, patient will ambulate 75' with FWW and SBA       Dates: Start:  08/05/23            Goal: LTG-By discharge, patient will transfer one surface to  another with FWW independently.        Dates: Start:  08/05/23            Goal: LTG-By discharge, patient will ambulate up/down 4-6 stairs with B handrails and SBA       Dates: Start:  08/05/23            Goal: LTG-By discharge, patient will transfer in/out of a car with FWW and supervision.        Dates: Start:  08/05/23

## 2023-08-21 ENCOUNTER — APPOINTMENT (OUTPATIENT)
Dept: PHYSICAL THERAPY | Facility: REHABILITATION | Age: 68
DRG: 057 | End: 2023-08-21
Attending: PHYSICAL MEDICINE & REHABILITATION
Payer: COMMERCIAL

## 2023-08-21 ENCOUNTER — PHARMACY VISIT (OUTPATIENT)
Dept: PHARMACY | Facility: MEDICAL CENTER | Age: 68
End: 2023-08-21
Payer: COMMERCIAL

## 2023-08-21 ENCOUNTER — APPOINTMENT (OUTPATIENT)
Dept: OCCUPATIONAL THERAPY | Facility: REHABILITATION | Age: 68
DRG: 057 | End: 2023-08-21
Attending: PHYSICAL MEDICINE & REHABILITATION
Payer: COMMERCIAL

## 2023-08-21 PROCEDURE — 99232 SBSQ HOSP IP/OBS MODERATE 35: CPT | Performed by: PHYSICAL MEDICINE & REHABILITATION

## 2023-08-21 PROCEDURE — 97535 SELF CARE MNGMENT TRAINING: CPT

## 2023-08-21 PROCEDURE — A9270 NON-COVERED ITEM OR SERVICE: HCPCS | Performed by: PHYSICAL MEDICINE & REHABILITATION

## 2023-08-21 PROCEDURE — 770010 HCHG ROOM/CARE - REHAB SEMI PRIVAT*

## 2023-08-21 PROCEDURE — 97116 GAIT TRAINING THERAPY: CPT

## 2023-08-21 PROCEDURE — 97112 NEUROMUSCULAR REEDUCATION: CPT

## 2023-08-21 PROCEDURE — 97530 THERAPEUTIC ACTIVITIES: CPT

## 2023-08-21 PROCEDURE — RXMED WILLOW AMBULATORY MEDICATION CHARGE: Performed by: PHYSICAL MEDICINE & REHABILITATION

## 2023-08-21 PROCEDURE — 700102 HCHG RX REV CODE 250 W/ 637 OVERRIDE(OP): Performed by: PHYSICAL MEDICINE & REHABILITATION

## 2023-08-21 RX ORDER — OMEPRAZOLE 20 MG/1
20 CAPSULE, DELAYED RELEASE ORAL
Qty: 30 CAPSULE | Refills: 0 | Status: SHIPPED | OUTPATIENT
Start: 2023-08-22 | End: 2023-09-14 | Stop reason: SDUPTHER

## 2023-08-21 RX ORDER — LOSARTAN POTASSIUM 50 MG/1
50 TABLET ORAL DAILY
Qty: 30 TABLET | Refills: 0 | Status: SHIPPED | OUTPATIENT
Start: 2023-08-22 | End: 2023-09-14 | Stop reason: SDUPTHER

## 2023-08-21 RX ORDER — ATORVASTATIN CALCIUM 80 MG/1
80 TABLET, FILM COATED ORAL EVERY EVENING
Qty: 30 TABLET | Refills: 0 | Status: SHIPPED | OUTPATIENT
Start: 2023-08-21 | End: 2023-09-14 | Stop reason: SDUPTHER

## 2023-08-21 RX ADMIN — APIXABAN 5 MG: 5 TABLET, FILM COATED ORAL at 08:10

## 2023-08-21 RX ADMIN — APIXABAN 5 MG: 5 TABLET, FILM COATED ORAL at 19:52

## 2023-08-21 RX ADMIN — LAMOTRIGINE 150 MG: 100 TABLET ORAL at 19:52

## 2023-08-21 RX ADMIN — ATORVASTATIN CALCIUM 80 MG: 40 TABLET, FILM COATED ORAL at 19:52

## 2023-08-21 RX ADMIN — SENNOSIDES AND DOCUSATE SODIUM 2 TABLET: 50; 8.6 TABLET ORAL at 19:52

## 2023-08-21 RX ADMIN — OMEPRAZOLE 20 MG: 20 CAPSULE, DELAYED RELEASE ORAL at 08:10

## 2023-08-21 RX ADMIN — LOSARTAN POTASSIUM 50 MG: 50 TABLET, FILM COATED ORAL at 05:12

## 2023-08-21 ASSESSMENT — 10 METER WALK TEST (10METWT)
AVERAGE TIME - SECONDS: 5.33
TRIAL 3: TIME TO WALK 10 METERS: 5
TRIAL 2: TIME TO WALK 10 METERS: 5
AVERAGE VELOCITY - METERS PER SECOND: 1.13
TRIAL 1: TIME TO WALK 10 METERS: 6

## 2023-08-21 ASSESSMENT — BALANCE ASSESSMENTS
PLACE ALTERNATE FOOT ON STEP OR STOOL WHILE STANDING UNSUPPORTED: 0
PICK UP OBJECT FROM THE FLOOR FROM A STANDING POSITION: 3
MEDICARE IMPAIRMENT PERCENTAGE: 20
STANDING UNSUPPORTED WITH FEET TOGETHER: 4
STANDING TO SITTING: 4
REACHING FORWARD WITH OUTSTRETCHED ARM WHILE STANDING: 3
LONG VERSION TOTAL SCORE (MAX 56): 45
TRANSFERS: 4
STANDING UNSUPPORTED ONE FOOT IN FRONT: 3
LONG VERSION TOTAL SCORE (MAX 56): 45
LOOK OVER LEFT AND RIGHT SHOULDERS WHILE STANDING: 4
STANDING UNSUPPORTED WITH EYES CLOSED: 4
STANDING ON ONE LEG: 2
SITTING UNSUPPORTED: 4
SITTING TO STANDING: 3
STANDING UNSUPPORTED: 4
TURN 360 DEGREES: 3

## 2023-08-21 ASSESSMENT — PAIN DESCRIPTION - PAIN TYPE: TYPE: ACUTE PAIN

## 2023-08-21 ASSESSMENT — ACTIVITIES OF DAILY LIVING (ADL)
TOILET_TRANSFER_LEVEL_OF_ASSIST: REQUIRES SUPERVISION WITH TOILET TRANSFER
BED_CHAIR_WHEELCHAIR_TRANSFER_DESCRIPTION: ADAPTIVE EQUIPMENT;INCREASED TIME;SUPERVISION FOR SAFETY
TOILETING_LEVEL_OF_ASSIST: REQUIRES SUPERVISION WITH TOILETING
BED_CHAIR_WHEELCHAIR_TRANSFER_DESCRIPTION: ADAPTIVE EQUIPMENT;INCREASED TIME;SUPERVISION FOR SAFETY
SHOWER_TRANSFER_LEVEL_OF_ASSIST: REQUIRES SUPERVISION WITH SHOWER TRANSFER

## 2023-08-21 ASSESSMENT — BRIEF INTERVIEW FOR MENTAL STATUS (BIMS)
WHAT DAY OF THE WEEK IS IT: CORRECT
ASKED TO RECALL SOCK: YES, NO CUE REQUIRED
INITIAL REPETITION OF BED BLUE SOCK - FIRST ATTEMPT: 3
WHAT MONTH IS IT: ACCURATE WITHIN 5 DAYS
WHAT YEAR IS IT: CORRECT
ASKED TO RECALL BLUE: YES, NO CUE REQUIRED
BIMS SUMMARY SCORE: 15
ASKED TO RECALL BED: YES, NO CUE REQUIRED

## 2023-08-21 ASSESSMENT — GAIT ASSESSMENTS
DISTANCE (FEET): 150
DISTANCE (FEET): 250
DEVIATION: DECREASED BASE OF SUPPORT;BRADYKINETIC;DECREASED HEEL STRIKE;DECREASED TOE OFF
GAIT LEVEL OF ASSIST: STANDBY ASSIST
DISTANCE (FEET): 1000
ASSISTIVE DEVICE: SINGLE POINT CANE
GAIT LEVEL OF ASSIST: STANDBY ASSIST
DEVIATION: DECREASED BASE OF SUPPORT;BRADYKINETIC;DECREASED HEEL STRIKE;DECREASED TOE OFF
ASSISTIVE DEVICE: SINGLE POINT CANE
GAIT LEVEL OF ASSIST: STANDBY ASSIST
ASSISTIVE DEVICE: SINGLE POINT CANE;OTHER (COMMENTS)
DEVIATION: DECREASED BASE OF SUPPORT;BRADYKINETIC;DECREASED HEEL STRIKE;DECREASED TOE OFF

## 2023-08-21 NOTE — DISCHARGE PLANNING
Cm  Tc to [facility] re status of single point cane that was order; still pending; they do not deliver cane's - pt/family will have to  in [place].   Tc to [facility]  option 2 re acceptance of referral    8/22/2023.    Tc to [facility] re status of referral; they indicate they did not receive referral; re faxed to .   Reviewed cm dc instructions w/ pt.   Reviewed signed copy of IMM and answered all questions.    Dc date /disposition:  Home w/ out pt therapy for PT/OT. Family to transport home.          Follow-up Information:     [facility]  [address] [place] [id number] [phone]  Follow up  Referral placed for out pt Neurology; they will call you to [facility] follow up.     Misa Marie M.D.  [address] [place] [id number] [phone]     Follow up  Call to schedule follow up with your PRIMARY CARE     [facility]  [address]  [facility] [address]  [phone]     Follow up  Provider for SINGLE POINT CANE. They do not deliver canes, and will need to be picked up at office.     [facility]  [address] [place], [place]  [phone]  Follow up  A referral has been sent for out patient PT/OT.

## 2023-08-21 NOTE — PROGRESS NOTES
"At 1800, RN notified pt was \"unresponsive\" when visiting with family. Family stated pt was \"unable to find words when responding.\" Upon assessment pt has no change in baseline. VSS. Charge nurse and MD notified.   "

## 2023-08-21 NOTE — CARE PLAN
The patient is Stable - Low risk of patient condition declining or worsening    Shift Goals  Clinical Goals: Safety  Patient Goals: Sleep  Family Goals: Education    Progress made toward(s) clinical / shift goals:    Problem: Knowledge Deficit - Standard  Goal: Patient and family/care givers will demonstrate understanding of plan of care, disease process/condition, diagnostic tests and medications  Outcome: Progressing   Patient educated on the POC and medications administered. All questions and concerns addressed at this time  Problem: Pain - Standard  Goal: Alleviation of pain or a reduction in pain to the patient’s comfort goal  Outcome: Progressing   Use of 0-10 pan scale Patient is able to verbalize pain and discomfort appropriately   Problem: Fall Risk - Rehab  Goal: Patient will remain free from falls  Outcome: Progressing  Patients uses the call light appropriately to call for assistance      Patient is not progressing towards the following goals:

## 2023-08-21 NOTE — THERAPY
"Physical Therapy   Discharge Summary     Patient Name: Doreen Noe  Age:  67 y.o., Sex:  female  Medical Record #: [id number]  Today's Date: 8/21/2023     Precautions  Precautions: Fall Risk  Comments: (P) R hemiparesis, hx of seizures and L side CVA    Subjective    Pt received up in wc chatting c/ roommate and ready for therapy, agreeable to participate. Reported that she spent 6 hrs in her wc yesterday and \"blanked out\" d/t being tired (RN note stated that pt's family reported pt became \"unresponsive\" but no change in baseline upon assessment). Pt reported that she slept very well last night and feels back to normal.     Pt reported that friends from [employer] installed a second HR for her DENZEL at home so she now has BHR. Pt's SO then her brother will live c/ pt upon d/c to help c/ transition home.     Objective       08/21/23 0701   PT Charge Group   PT Gait Training (Units) 3   PT Therapeutic Activities (Units) 1   PT Total Time Spent   PT Individual Total Time Spent (Mins) 60   Precautions   Comments R hemiparesis, hx of seizures and L side CVA   Gait Functional Level of Assist    Gait Level Of Assist Standby Assist  (CGA-SBA)   Assistive Device Single Point Cane   Distance (Feet) 250   # of Times Distance was Traveled 3  (during dynamic gait training (see PT note for details); plus 150 ft and 100 ft x2 bw activities)   Deviation Decreased Base Of Support;Bradykinetic;Decreased Heel Strike;Decreased Toe Off   Stairs Functional Level of Assist   Level of Assist with Stairs Standby Assist   # of Stairs Climbed 16  (on 6\" stairs BHR)   Stairs Description Extra time;Hand rails;Supervision for safety;Verbal cueing  (step through progressing to step to asc/desc)   Transfer Functional Level of Assist   Bed, Chair, Wheelchair Transfer Standby Assist   Bed Chair Wheelchair Transfer Description Adaptive equipment;Increased time;Supervision for safety  (stand step SPC)   Bed Mobility    Supine to Sit Modified " Independent  (standard bed no bed rail)   Sit to Supine Modified Independent  (standard bed no bed rail)   Sit to Stand Standby Assist   Neuro-Muscular Treatments   Neuro-Muscular Treatments Compensatory Strategies;Verbal Cuing   Comments Gait training c/ head turns and changes in gait speed (see PT note for details)   10 Meter Walk Test   Normal - Trial 1 6 seconds   Normal - Trial 2 5 seconds   Normal - Trial 3 5 seconds   Normal Average Time 5.33 seconds   Normal Average Velocity (m/s) 1.13   Interdisciplinary Plan of Care Collaboration   Patient Position at End of Therapy Seated;Other (Comments)  (in cafeteria for bfast)   Roll Left and Right   Assistance Needed Independent   CARE Score - Roll Left and Right 6   Sit to Lying   Assistance Needed Independent   CARE Score - Sit to Lying 6   Lying to Sitting on Side of Bed   Assistance Needed Independent   CARE Score - Lying to Sitting on Side of Bed 6   Sit to Stand   Assistance Needed Independent   CARE Score - Sit to Stand 6   Chair/Bed-to-Chair Transfer   Assistance Needed Independent;Adaptive equipment   CARE Score - Chair/Bed-to-Chair Transfer 6   Walk 10 Feet   Assistance Needed Independent;Adaptive equipment   CARE Score - Walk 10 Feet 6   Walk 50 Feet with Two Turns   Assistance Needed Independent;Adaptive equipment   CARE Score - Walk 50 Feet with Two Turns 6   Walk 150 Feet   Assistance Needed Independent;Adaptive equipment   CARE Score - Walk 150 Feet 6   1 Step (Curb)   Assistance Needed Independent;Adaptive equipment   CARE Score - 1 Step (Curb) 6   4 Steps   Assistance Needed Independent   CARE Score - 4 Steps 6   12 Steps   Assistance Needed Independent   CARE Score - 12 Steps 6   Wheel 50 Feet with Two Turns   Reason if not Attempted Activity not applicable   CARE Score - Wheel 50 Feet with Two Turns 9   Wheel 150 Feet   Reason if not Attempted Activity not applicable   CARE Score - Wheel 150 Feet 9   P.T. Discharge Summary   Discharge Location Home  "  Patient Discharging with Assist of Family;Spouse / Significant Other   Level of Supervision Required Upon Discharge Intermittent Supervision   Recommended Equipment for Discharge Single Point Cane   Recommeded Services Upon Discharge Home Health Physical Therapy   Long Term Goals Met 4   Long Term Goals Not Met 0   Reason(s) for Goals Not Met n/a   Criteria for Termination of Services Maximum Function Achieved for Inpatient Rehabilitation   Discharge Instructions to Patient   Level of Assist Required for Ambulation Supervision on Flat Surfaces;Supervision on Curbs;Supervision on Stairs  (supervision initially for safety)   Distance Patient May Ambulate as tolerated   Device Recommended for Ambulation Single Point Cane   Level of Assist Required for Transfers Supervision  (supervision initially for safety)   Device Recommended for Transfers Single Point Cane   Home Exercise Program Refer to Home Exercise Program Handout for Details     Bed mobility: Performed on standard bed no bed rail on R side (when facing bed) x1 time c/ mod I.    Floor recovery: Performed in full x2 times at EOM c/ SBA using supine vs prone>side sitting>quadruped>tall kneeling>half kneeling c/ LLE vs RLE leading>stand pivot to sit progression.    Gait training c/ SPC and CGA-SBA for approx 250 ft x3:  Head turns horiz/vertical based on therapist cues - pt demo'ed slower gait speed and mild-mod path deviations during horiz>vertical head turns, able to maintain head turn position for 3-4 steps at a time before returning head to neutral despite therapist cues   Fast/slow/self-selected gait speed based on therapist cues - c/ minimal difference in \"slow\" gait speed but able to demo good \"fast\" gait speed    Assessment    Pt c/ good tolerance for session focused on d/c prep and dynamic gait training. Cont to demo mild-mod gait deviations c/ head turns, however pt c/ much improved self-selected gait speed after fast/slow walking activity as described " above. Scored 1.13 m/s avg velocity during 10 meter walk test which places pt in community amb category.    Strengths: Able to follow instructions, Effective communication skills, Good insight into deficits/needs, Independent prior level of function, Pleasant and cooperative, Motivated for self care and independence  Barriers: Decreased endurance, Fatigue, Generalized weakness, Hemiparesis, Home accessibility, Impaired activity tolerance, Impaired balance, Spasticity    Plan    Finish collecting d/c irf iqra for anticipated d/c to home on 8/22/23 with SO and family support      Gait training with SPC - incorporate head turns/visual disturbances/busy environments for balance and attention training, sit<>stands with emphasis on sequencing/safety/avoiding retropulsion, continue treadmill training, stairs (lives in [facility] 5 [place] R[facility]), reactive standing balance, mat program, RLE and trunk NRE, RLE forced use, txfer training SPC, bed mobility, R sided awareness, floor recovery. Nustep adjunct c/ techs 2-3x/wk as able. Vector profile in [place].        Passport items to be completed:  Get in/out of bed safely, in/out of a vehicle, safely use mobility device, walk or wheel around home/community, navigate up and down stairs, show how to get up/down from the ground, ensure home is accessible, demonstrate HEP, complete caregiver training    Physical Therapy Problems (Active)       There are no active problems.

## 2023-08-21 NOTE — CARE PLAN
The patient is Watcher - Medium risk of patient condition declining or worsening    Shift Goals  Clinical Goals: Safety  Patient Goals: sleep  Family Goals: Education    Progress made toward(s) clinical / shift goals: VSS, pt able to participate in therapy, denies pain, brief unresponsive episode per pt's family, MD notified. No change in pt baseline upon assessment.

## 2023-08-21 NOTE — THERAPY
Occupational Therapy   Discharge Summary     Patient Name: Doreen Noe  Age:  67 y.o., Sex:  female  Medical Record #: [id number]  Today's Date: 8/21/2023     Precautions  Precautions: Fall Risk  Comments: R hemiparesis, hx of seizures and L side CVA    Subjective    Patient pleased with her progress in therapy during rehab stay, feels ready to DC home.      Objective     08/21/23 1231   OT Charge Group   OT Self Care / ADL (Units) 4   OT Total Time Spent   OT Individual Total Time Spent (Mins) 60   Precautions   Precautions Fall Risk   Comments R hemiparesis, hx of seizures and L side CVA   Vitals   O2 Delivery Device None - Room Air   Cognition    Level of Consciousness Alert   Functional Level of Assist   Bathing Supervision   Upper Body Dressing Modified Independent  (for bra and shirt)   Lower Body Dressing Standby Assist  (to retrieve and don underwear, pants, socks and shoes while incorporating sitting and standing)   Bed, Chair, Wheelchair Transfer Supervised   Tub / Shower Transfers Supervised   Eating   Assistance Needed Independent   CARE Score - Eating 6   Oral Hygiene   Assistance Needed Independent   CARE Score - Oral Hygiene 6   Toileting Hygiene   Assistance Needed Supervision   CARE Score - Toileting Hygiene 4   Shower/Bathe Self   Assistance Needed Supervision   CARE Score - Shower/Bathe Self 4   Upper Body Dressing   Assistance Needed Independent   CARE Score - Upper Body Dressing 6   Lower Body Dressing   Assistance Needed Supervision   CARE Score - Lower Body Dressing 4   Putting On/Taking Off Footwear   Assistance Needed Independent   CARE Score - Putting On/Taking Off Footwear 6   Toilet Transfer   Assistance Needed Supervision   CARE Score - Toilet Transfer 4   Cognitive Pattern Assessment   Cognitive Pattern Assessment Used BIMS   Brief Interview for Mental Status (BIMS)   Repetition of Three Words (First Attempt) 3   Temporal Orientation: Year Correct   Temporal Orientation: Month  "Accurate within 5 days   Temporal Orientation: Day Correct   Recall: \"Sock\" Yes, no cue required   Recall: \"Blue\" Yes, no cue required   Recall: \"Bed\" Yes, no cue required   BIMS Summary Score 15   Confusion Assessment Method (CAM)   Is there evidence of an acute change in mental status from the patient's baseline? No   Inattention Behavior not present   Disorganized thinking Behavior not present   Altered level of consciousness Behavior not present   Discharge Summary    Discharge Location  Home   Patient Discharging with Assist of Family   (family/SO to provide assist as needed following DC from rehab)   Level of Supervision Required Intermittent Supervision  (supervision initially recommended primarily for standing tasks/showers for safety/balance)   Recommended Equipment for Discharge Single Point Cane;Tub Transfer Bench;Grab Bars by Toilet;Grab Bars in Tub / Shower;Hand Held Shower Head   Recommended Services Upon Discharge Outpatient Occupational Therapy   Long Term Goals Met 3   Long Term Goals Not Met 0   Criteria for Termination of Services Maximum Function Achieved for Inpatient Rehabilitation   Discharge Instructions to Patient   Level of Assist Required for Eating Able to Complete Eating without Assist   Level of Assist Required for Grooming Able to Complete Grooming without Assist   Level of Assist Required for Dressing Requires Supervision with Dressing  (for lower body dressing)   Level of Assist Required for Toileting Requires Supervision with Toileting   Level of Assist Required for Toilet Transfer Requires Supervision with Toilet Transfer   Level of Assist Required for Bathing Requires Supervision with Bathing   Level of Assist Required for Shower Transfer Requires Supervision with Shower Transfer   Level of Assist Required for Home Mgmt Requires Supervision with Home Management   Level of Assist Required for Meal Prep Requires Supervision with Meal Preparation   Driving Please Contact Physician " Prior to Driving   Home Exercise Program Refer to Home Exercise Program Handout for Details       Assessment    Patient tolerated OT session well with focus on DC IRF [place] in anticipation of DC home tomorrow. Demo's readiness to go home w/ family/SO support. Verbalized understanding re: recommendation for initial supervision for standing and shower tasks. Reviewed passport to independence/checked off all OT goals.      Plan    DC home tomorrow w/ family/SO support.     Occupational Therapy Goals (Active)       There are no active problems.

## 2023-08-21 NOTE — CARE PLAN
Problem: PT-Long Term Goals  Goal: LTG-By discharge, patient will ambulate 75' with FWW and SBA  Outcome: Met  Note: SPC  Goal: LTG-By discharge, patient will transfer one surface to another with FWW independently.   Outcome: Met  Note: SPC  Goal: LTG-By discharge, patient will ambulate up/down 4-6 stairs with B handrails and SBA  Outcome: Met  Goal: LTG-By discharge, patient will transfer in/out of a car with FWW and supervision.   Outcome: Met  Note: SPC

## 2023-08-21 NOTE — PROGRESS NOTES
"Rehab Progress Note     Date of Service: 8/21/2023  Chief Complaint: Follow-up stroke    Interval Events (Subjective)    Patient seen and examined today in her room.  Apparently she had a several minute episode of difficulty talking yesterday when her family was present.  This is happened after she had pool therapy.  It quickly resolved without any postictal state or new neurological impairments.  Patient is looking forward to her discharge home tomorrow.    Patient has no other new questions, concerns, or complaints today.       Objective:  VITAL SIGNS: /76   Pulse 74   Temp 36.9 °C (98.4 °F)   Resp 18   Ht 1.702 m (5' 7.01\")   Wt 64 kg (141 lb 1.5 oz)   SpO2 92%   BMI 22.09 kg/m²   Gen: alert, no apparent distress  CV: Regular rate, regular rhythm  Resp: Clear to auscultation bilaterally  Neuro: Very mild right hemiparesis, chronic left dysmetria/hand tremor        No results found for this or any previous visit (from the past 72 hour(s)).        Scheduled Medications   Medication Dose Frequency    losartan  50 mg Q DAY    senna-docusate  2 Tablet BID    omeprazole  20 mg QAM AC    apixaban  5 mg BID    atorvastatin  80 mg Q EVENING    lamoTRIgine  150 mg Q EVENING       Current Diet Order   Procedures    Diet Order Diet: Regular; Tray Modifications (optional): SLP - Deliver to Nursing Station       Assessment:    This patient is a 67 y.o. female admitted for acute inpatient rehabilitation with Ischemic stroke (HCC).      I, Leona Pleitez M.D./MD., was present and led the interdisciplinary team conference on 8/16/2023.  I led the IDT conference and agree with the IDT conference documentation and plan of care as noted below.     Nursing:  Diet Current Diet Order   Procedures    Diet Order Diet: Regular; Tray Modifications (optional): SLP - Deliver to Nursing Station       Eating ADL Supervision  Increased time   % of Last Meal  Oral Nutrition: *  * Meal *  *, Lunch, Between % Consumed (80%) "   Sleep No issues   Bowel Last BM: 08/15/23   Bladder Post Void  13   Barriers to Discharge Home: none      Physical Therapy:  Bed Mobility    Transfers Contact Guard Assist  Adaptive equipment, Increased time, Set-up of equipment, Supervision for safety (stand pivot from w/c to bed)   Mobility Contact Guard Assist   Stairs Standby Assist   Barriers to Discharge Home: right sided weakness, impaired balance      Occupational Therapy:  Grooming Modified Independent, Seated   Bathing Contact Guard Assist (last assessed 8/10)   UB Dressing Supervision (last assessed 8/10)   LB Dressing Minimal Assist (last assessed 8/10)   Toileting Standby Assist   Shower & Tub Transfer Contact Guard Assist (last assessed 8/10)   Barriers to Discharge Home: right sided weakness      Speech-Language Pathology:  Comprehension:  Modified Independent  Comprehension Description:  Glasses  Expression:  Independent  Expression Description:     Social Interaction:  Independent  Social Interaction Description:     Problem Solving:  Modified Independent  Problem Solving Description:  Increased time, Supervision, Therapy schedule, Verbal cueing  Memory:  Supervision  Memory Description:  Increased time, Supervision, Therapy schedule, Verbal cueing  Barriers to Discharge Home: none    Respiratory Therapy:  O2 (LPM): 0  O2 Delivery Device: None - Room Air    Case Management:  Continues to work on disposition and DME needs.      Discharge Date/Disposition:    8/22    Outpatient PT/OT    Equip: SPC    Follow-ups: PCP, stroke bridge, cardiology, neurology     Rx: Meds to beds      Problem List/Medical Decision Making and Plan:      L CAROL ANN stroke  Right hemiparesis, improved  Right neglect, improved  Mild cognitive impairment   PT/OT, 1.5 hr each discipline, 5 days per week  8/10: SLP picked up for 30 min, share other 30 min with OT/PT  Aqua therapy ordered -to start after Zio patch is removed  Recreational therapy ordered    Zio patch  returned    Continue Eliquis and atorvastatin    Outpatient follow-up with stroke Bridge clinic, cardiology, referrals made    Right spasticity  Currently very mild and only at night  Continue to monitor    Seizure disorder  Last seizure November 2022  Generalized tonic-clonic  Outpatient follow-up with a provider in [place]  Continue lamotrigine     Tremor, left-sided  History of right MCA stroke  Outpatient referral to movement disorder as needed     Hypertension, resolved  Continue losartan  Continue hydralazine as needed, not requiring  Patient advised to get a blood pressure cuff for home monitoring    Rash, resolved  Looks like rosacea  Given patient handout information  Outpatient follow-up with dermatology as needed     Hyperlipidemia  Continue atorvastatin    GI prophylaxis  Continue omeprazole while on Eliquis    DVT prophylaxis  Continue Eliquis    Leona Pleitez M.D.  Physical Medicine and Rehabilitation

## 2023-08-21 NOTE — CARE PLAN
Problem: Dressing  Goal: STG-Within one week, patient will dress UB at a Mod I level.   Outcome: Met     Problem: OT Long Term Goals  Goal: LTG-By discharge, patient will complete basic self care tasks at a SUP/Mod I level with AE/AD/DME PRN.   Outcome: Met  Goal: LTG-By discharge, patient will perform bathroom transfers at a SUP/Mod I level with AE/AD/DME PRN.   Outcome: Met  Goal: LTG-By discharge, patient will complete basic home management at a Min A to Mod I level with LRD and AE PRN.   Outcome: Met

## 2023-08-21 NOTE — DISCHARGE INSTRUCTIONS
"Physical Therapy Discharge Instructions for Doreen Noe    8/21/2023    Level of Assist Required for Ambulation: Supervision on Flat Surfaces, Supervision on Curbs, Supervision on Stairs (supervision initially for safety)  Distance Patient May Ambulate: as tolerated  Device Recommended for Ambulation: Single Point Cane  Level of Assist Required for Transfers: Supervision (supervision initially for safety)  Device Recommended for Transfers: Single Point Cane  Home Exercise Program: Refer to Home Exercise Program Handout for Details  It was a pleasure working with youDoreen! Best of luck in your continued recovery. -Jonelle, PT     Occupational Therapy Discharge Instructions for Doreen Noe    8/21/2023    Level of Assist Required for Eating: Able to Complete Eating without Assist  Level of Assist Required for Grooming: Able to Complete Grooming without Assist  Level of Assist Required for Dressing: Requires Supervision with Dressing (for lower body dressing)  Level of Assist Required for Toileting: Requires Supervision with Toileting  Level of Assist Required for Toilet Transfer: Requires Supervision with Toilet Transfer  Level of Assist Required for Bathing: Requires Supervision with Bathing  Level of Assist Required for Shower Transfer: Requires Supervision with Shower Transfer  Level of Assist Required for Home Mgmt: Requires Supervision with Home Management  Level of Assist Required for Meal Prep: Requires Supervision with Meal Preparation  Driving: Please Contact Physician Prior to Driving  Home Exercise Program: Refer to Home Exercise Program Handout for Details    Congratulations on graduating from rehabDoreen! It has been a pleasure working with you and watching you make gains each day. Best wishes in the rest of your recovery!  -[email or web address]       Prevent Falls in Your Home    \"Falling once doubles your chance of falling again\"        -Center for Disease Control and " "Prevention    Falls in the home can lead to serious injury (fractures, brain injuries), hospitalizations, increased medical costs, and could even be fatal.  The good news is, there are many precautions you can take to avoid falls in your home and help keep you safe:     If prescribed an assistive device (walker, crutches), use as instructed by the healthcare provider\"   Remove any tripping hazards from your home, including loose cords, throw rugs and clutter  Keep a nightlight on in dark (hallways, bathrooms, etc)   Get up slowly, to make sure you feel okay before getting up  Be aware of any side effects of your medications: some medications may make you dizzy  Place a non-skid rubber mat in your shower or tub-consider a shower bench or chair if unsteady on your feet  Wear supportive shoes or non-skid socks when moving around  Start an exercise program once approved by your provider.  If you are feeling weak following a hospital stay, talk to your doctor about home health or outpatient therapy programs designed to help rebuild your strength and endurance    Depression / Suicide Risk    As you are discharged from this [facility] facility, it is important to learn how to keep safe from harming yourself.    Recognize the warning signs:  Abrupt changes in personality, positive or negative- including increase in energy   Giving away possessions  Change in eating patterns- significant weight changes-  positive or negative  Change in sleeping patterns- unable to sleep or sleeping all the time   Unwillingness or inability to communicate  Depression  Unusual sadness, discouragement and loneliness  Talk of wanting to die  Neglect of personal appearance   Rebelliousness- reckless behavior  Withdrawal from people/activities they love  Confusion- inability to concentrate     If you or a loved one observes any of these behaviors or has concerns about self-harm, here's what you can do:  Talk about it- your feelings and reasons " for harming yourself  Remove any means that you might use to hurt yourself (examples: pills, rope, extension cords, firearm)  Get professional help from the community (Mental Health, Substance Abuse, psychological counseling)  Do not be alone:Call your Safe Contact- someone whom you trust who will be there for you.  Call your local CRISIS HOTLINE 411-1183 or 831-085-4527  Call your local Children's Mobile Crisis Response Team [place] (451) 001-5058 or [place]  Call the toll free National Suicide Prevention Hotlines   National Suicide Prevention Lifeline 273-177-EBLU (2808)  [place] [facility] Line Network 800-SUICIDE (800-5592)    Hospital Discharge After a Stroke   Being discharged from the hospital after a stroke can feel overwhelming. Many things may be different. It is normal to feel scared or anxious. Some stroke survivors may be able to return to their homes. Others may need more specialized care on a temporary or permanent basis.  Your stroke care team will work with you to develop a discharge plan that is best for you. Ask questions if you do not understand something. Invite a friend or family member to participate in discharge planning. It is important to understand and follow your discharge plan to help prevent another stroke or other problems.  General recommendations    Ask a friend or family member to get needed things in place before you go home if possible. A therapist can come to your home to make recommendations for safety equipment. Ask your health care provider if you would benefit from this service or from home care.  Take steps to prevent falls, such as:  Installing grab bars in the shower or using a shower chair.  Install grab bars by the toilet.  Removing tripping hazards, such as area rugs or cords.  Supplies needed  Ask your health care provider for a list of medical equipment and supplies you will need at home. These may  include:  Walkers.  Canes.  Wheelchairs.  Hand-strengthening devices.  Special eating utensils.  Medical equipment can be rented or purchased, depending on your insurance coverage. Check with your insurance company about what is covered.  Follow these instructions at home:  Medicines  Take all medicines exactly as told by your health care provider to prevent serious harm, such as another stroke. Make sure you understand:  What medicine to take.  Why you are taking the medicine.  How and when to take it.  If it can be taken with your other medicines and herbal supplements.  Possible side effects, and when to call your health care provider if you have side effects.  How you will get and pay for your medicines. Medical assistance programs may be able to help you pay for prescription medicines if you cannot afford them.  If you are taking an anticoagulant:  Be sure to take it exactly as told by your health care provider. This medicine can increase the risk of bleeding because it works to prevent blood clots. You may need to take certain precautions to prevent bleeding.  Do not take medicines that contain aspirin or NSAIDs, such as ibuprofen, unless your health care provider approves. These medicines increase your risk for dangerous bleeding.  Preventing another stroke  Having a stroke puts you at risk for another stroke in the future. Ask your health care provider what actions you can take to lower the risk. These may include:  Increasing how much you exercise.  Making a healthy eating plan.  Quitting smoking.  Managing other health conditions, such as high blood pressure, high cholesterol, or diabetes.  Limiting alcohol use.  General instructions  Before you leave the hospital, you will be given information about stroke warning signs. Share these with your friends and family members.  You will need to follow up regularly with a health care provider. You may need rehabilitation, such as physical therapy, occupational  "therapy, and speech-language therapy. Keeping these appointments is very important to your recovery.  Be sure to bring your medicine list and discharge papers to your appointments. Use a calendar or appointment reminder if you need help to keep track of your schedule.  Contact a health care provider if:  You are taking an anticoagulant, and you have one or more of these problems:  Bleeding or bruising.  A fall or other injury to your head.  Blood in your urine or stool (feces).  Get help right away if you:    Have any symptoms of a stroke. \"BE FAST\" is an easy way to remember the main warning signs of a stroke:  B - Balance. Signs are dizziness, sudden trouble walking, or loss of balance.  E - Eyes. Signs are trouble seeing or a sudden change in vision.  F - Face. Signs are sudden weakness or numbness of the face, or the face or eyelid drooping on one side.  A - Arms. Signs are weakness or numbness in an arm. This happens suddenly and usually on one side of the body.  S - Speech. Signs are sudden trouble speaking, slurred speech, or trouble understanding what people say.  T - Time. Time to call emergency services. Write down what time symptoms started. Your emergency responders will need to know this information.  Have other signs of a stroke, such as:  A sudden, severe headache with no known cause.  Nausea or vomiting.  Seizure.  These symptoms may represent a serious problem that is an emergency. Do not wait to see if the symptoms will go away. Get medical help right away. Call your local emergency services (911 in the [place] Do not drive yourself to the hospital.  Summary  Being discharged from the hospital after a stroke can feel overwhelming. It is normal to feel scared or anxious.  Make sure you take medicines exactly as told by your health care provider.  Know the warning signs of a stroke. Before you leave the hospital, you will be given information about stroke warning signs. Share these with your friends " "and family members.  Get help right way if you have any symptoms of a stroke. \"BE FAST\" is an easy way to remember the main warning signs of a stroke.  This information is not intended to replace advice given to you by your health care provider. Make sure you discuss any questions you have with your health care provider.  Document Revised: 04/19/2023 Document Reviewed: 08/04/2021  [employer] Patient Education © 2023 [employer]        [facility] NURSING DISCHARGE INSTRUCTIONS    Blood Pressure : 129/66  Weight: 64 kg (141 lb 1.5 oz)  Nursing recommendations for Doreen Noe at time of discharge are as follows:  Client and Family Member verbalized understanding of all discharge instructions and prescriptions.     Review all your home medications and newly ordered medications with your doctor and/or pharmacist. Follow medication instructions as directed by your doctor and/or pharmacist.    Pain Management:   Discharge Pain Medication Instructions:  Comfort Goal: Sleep Comfortably, Comfort with Movement  Notify your primary care provider if pain is unrelieved with these measures, if the pain is new, or increased in intensity.    Discharge Skin Characteristics: Warm  Discharge Skin Exam:       Skin / Wound Care Instructions: Please contact your primary care physician for any change in skin integrity.    If You Have Surgical Incisions / Wounds:  Monitor surgical site(s) for signs of increased swelling, redness or symptoms of drainage from the site or fever as this could indicate signs and symptoms of infection. If these symptoms are noted, notifiy your primary care provider.      Discharge Safety Instructions: Should Not Be Left Alone In The House     Discharge Safety Concerns: Other (Comments) (Decreased endurance, Fatigue, Generalized weakness, Hemiparesis, Home accessibility, Impaired activity tolerance, Impaired balance, Spasticity)  The interdisciplinary team has made recommendation that you " should not be left alone  in the house due to decreased endurance, Fatigue, Generalized weakness, Hemiparesis, Home accessibility, Impaired activity tolerance, Impaired balance, Spasticity  Anti-embolic stockings are not required to increase circulation to the lower extremities.    Discharge Diet: Regular diet     Discharge Liquids: thin liquids  Discharge Bowel Function: Continent  Please contact your primary care physician for any changes in bowel habits.  Discharge Bowel Program:    Discharge Bladder Function: Continent  Discharge Urinary Devices: None      Nursing Discharge Plan:        Case Management Discharge Instructions:   Discharge Location:    Agency Name/Address/Phone:    [facility]:    Outpatient Services:    DME Provider/Phone:    Medical Equipment Ordered:    Prescription Faxed to:        Discharge Medication Instructions:  Below are the medications your physician expects you to take upon discharge:

## 2023-08-21 NOTE — PROGRESS NOTES
NURSING DAILY NOTE    Name: Doreen Noe   Date of Admission: 8/4/2023   Admitting Diagnosis: Ischemic stroke (HCC)  Attending Physician: Leona Pleitez M.d.  Allergies: Patient has no known allergies.    Safety  Patient Assist  sba  Patient Precautions  Fall Risk  Precaution Comments  R hemiparesis, hx of seizures and L side CVA, Zio patch  Bed Transfer Status  Contact Guard Assist  Toilet Transfer Status   Standby Assist  Assistive Devices  Rails, Wheelchair  Oxygen  None - Room Air  Diet/Therapeutic Dining  Current Diet Order   Procedures    Diet Order Diet: Regular; Tray Modifications (optional): SLP - Deliver to Nursing Station     Pill Administration  whole  Agitated Behavioral Scale     ABS Level of Severity       Fall Risk  Has the patient had a fall this admission?   No  Mariela Rodgers Fall Risk Scoring  16, HIGH RISK  Fall Risk Safety Measures  bed alarm, chair alarm, poor balance, and low vision/ hearing    Vitals  Temperature: 36.2 °C (97.2 °F)  Temp src: Oral  Pulse: 78  Respiration: 16  Blood Pressure : 120/61  Blood Pressure MAP (Calculated): 81 MM HG  BP Location: Left, Upper Arm  Patient BP Position: Supine     Oxygen  Pulse Oximetry: 96 %  O2 (LPM): 0  O2 Delivery Device: None - Room Air    Bowel and Bladder  Last Bowel Movement  08/18/23 (per pt)  Stool Type  Type 6: Fluffy pieces with ragged edges, a mushy stool  Bowel Device  Bathroom  Continent  Bladder: Continent void   Bowel: Continent movement  Bladder Function  Urine Void (mL):  (moderate)  Number of Times Voided: 2  Urinary Options: Yes  Urine Color: Yellow  Genitourinary Assessment   Bladder Assessment (WDL):  WDL Except  Engel Catheter: Not Applicable  Urine Color: Yellow  Bladder Device: Bathroom  Bladder Scan: Post Void  $ Bladder Scan Results (mL): 13    Skin  Angus Score   18  Sensory Interventions   Bed Types: Standard/Trauma Mattress  Skin Preventative Measures:  Pillows in Use for Support / Positioning  Moisture Interventions  Moisturizers/Barriers: Barrier Cream      Pain  Pain Rating Scale  0 - No Pain  Pain Location  Neck  Pain Location Orientation     Pain Interventions   Declines    ADLs    Bathing    (shower done in AM shift)  Linen Change   Complete  Personal Hygiene  Moist Preeti Wipes, Perineal Care  Chlorhexidine Bath      Oral Care  Brushed Teeth  Teeth/Dentures     Shave     Nutrition Percentage Eaten  *  * Meal *  *, Lunch, Between 50-75% Consumed  Environmental Precautions  Bed in Low Position, Treaded Slipper Socks on Patient  Patient Turns/Positioning  Patient Turns Self from Side to Side  Patient Turns Assistance/Tolerance  * * Assistance, * * Tolerance  Bed Positions  Bed Controls On  Head of Bed Elevated  Self regulated      Psychosocial/Neurologic Assessment  Psychosocial Assessment  Psychosocial (WDL):  Within Defined Limits  Neurologic Assessment  Neuro (WDL): Exceptions to WDL  Level of Consciousness: Alert  Orientation Level: Oriented X4  Cognition: Appropriate judgement, Appropriate safety awareness, Appropriate attention/concentration, Appropriate for developmental age, Follows commands  Speech: Clear  Pupil Assesment: No  Motor Function/Sensation Assessment: Dorsiflexion, Motor response, Sensation, Motor strength  R Foot Dorsiflexion: Weak  L Foot Dorsiflexion: Strong  RUE Motor Response: Responds to commands  RUE Sensation: Full sensation  Muscle Strength Right Arm: Good Strength Against Gravity and Moderate Resistance  LUE Motor Response: Tremors, Responds to commands  LUE Sensation: Full sensation  Muscle Strength Left Arm: Normal Strength Against Gravity and Full Resistance  RLE Motor Response: Responds to commands  RLE Sensation: Numbness  Muscle Strength Right Leg: Weak Movement but Not Against Gravity or Resistance  LLE Motor Response: Responds to commands  LLE Sensation: Full sensation  Muscle Strength Left Leg: Normal Strength Against  Gravity and Full Resistance  EENT (WDL):  WDL Except    Cardio/Pulmonary Assessment  Edema      Respiratory Breath Sounds  RUL Breath Sounds: Clear  RML Breath Sounds: Clear  RLL Breath Sounds: Clear  MONIKA Breath Sounds: Clear  LLL Breath Sounds: Clear  Cardiac Assessment   Cardiac (WDL):  WDL Except

## 2023-08-21 NOTE — PROGRESS NOTES
NURSING DAILY NOTE    Name: Doreen Noe   Date of Admission: 8/4/2023   Admitting Diagnosis: Ischemic stroke (HCC)  Attending Physician: Leona Pleitez M.d.  Allergies: Patient has no known allergies.    Safety  Patient Assist  sba  Patient Precautions  Fall Risk  Precaution Comments  R hemiparesis, hx of seizures and L side CVA, Zio patch  Bed Transfer Status  Contact Guard Assist  Toilet Transfer Status   Standby Assist  Assistive Devices  Rails, Wheelchair  Oxygen  None - Room Air  Diet/Therapeutic Dining  Current Diet Order   Procedures    Diet Order Diet: Regular; Tray Modifications (optional): SLP - Deliver to Nursing Station     Pill Administration  whole and one at a time   Agitated Behavioral Scale     ABS Level of Severity       Fall Risk  Has the patient had a fall this admission?   No  Mariela Rodgers Fall Risk Scoring  15, HIGH RISK  Fall Risk Safety Measures  bed alarm, chair alarm, seatbelt alarm, poor balance, and low vision/ hearing    Vitals  Temperature: 36.5 °C (97.7 °F)  Temp src: Temporal  Pulse: 64  Respiration: 18  Blood Pressure : 120/61  Blood Pressure MAP (Calculated): 81 MM HG  BP Location: Left, Upper Arm  Patient BP Position: Supine     Oxygen  Pulse Oximetry: 94 %  O2 (LPM): 0  O2 Delivery Device: None - Room Air    Bowel and Bladder  Last Bowel Movement  08/19/23 (per pt)  Stool Type  Type 4: Like a sausage or snake, smooth and soft, Patient stated  Bowel Device  Bathroom  Continent  Bladder: Continent void   Bowel: Continent movement  Bladder Function  Urine Void (mL):  (moderate)  Number of Times Voided: 1  Urinary Options: Yes  Urine Color: Unable To Evaluate  Genitourinary Assessment   Bladder Assessment (WDL):  WDL Except  Engel Catheter: Not Applicable  Urine Color: Unable To Evaluate  Bladder Device: Bathroom  Bladder Scan: Post Void  $ Bladder Scan Results (mL): 13    Skin  Angus Score   18  Sensory  Interventions   Bed Types: Standard/Trauma Mattress  Skin Preventative Measures: Pillows in Use for Support / Positioning  Moisture Interventions  Moisturizers/Barriers: Barrier Wipes      Pain  Pain Rating Scale  0 - No Pain  Pain Location  Neck  Pain Location Orientation     Pain Interventions   Declines    ADLs    Bathing    (shower done in AM shift)  Linen Change   Complete  Personal Hygiene  Moist Preeti Wipes, Perineal Care  Chlorhexidine Bath      Oral Care  Brushed Teeth  Teeth/Dentures     Shave     Nutrition Percentage Eaten  *  * Meal *  *, Lunch, Between 50-75% Consumed  Environmental Precautions  Treaded Slipper Socks on Patient, Personal Belongings, Wastebasket, [place] etc. in Easy Reach, Transferred to Stronger Side, Report Given to Other Health Care Providers Regarding Fall Risk, Communication Sign for Patients & Families, Mobility Assessed & Appropriate Sign Placed, Bed in Low Position  Patient Turns/Positioning  Patient Turns Self from Side to Side  Patient Turns Assistance/Tolerance  Standby Assist  Bed Positions  Bed Controls On, Bed Locked  Head of Bed Elevated  Self regulated      Psychosocial/Neurologic Assessment  Psychosocial Assessment  Psychosocial (WDL):  Within Defined Limits  Neurologic Assessment  Neuro (WDL): Exceptions to WDL  Level of Consciousness: Alert  Orientation Level: Oriented X4  Cognition: Appropriate judgement, Appropriate safety awareness, Appropriate attention/concentration, Appropriate for developmental age, Follows commands  Speech: Clear  Pupil Assesment: Yes  R Pupil Size (mm): 3  R Pupil Shape / Description: Round  R Pupil Reaction: Brisk  L Pupil Size (mm): 3  L Pupil Shape / Description: Round  L Pupil Reaction: Brisk  Motor Function/Sensation Assessment: Dorsiflexion, Motor response, Sensation, Motor strength  R Foot Dorsiflexion: Weak  L Foot Dorsiflexion: Strong  RUE Motor Response: Responds to commands  RUE Sensation: Full sensation  Muscle Strength Right  Arm: Good Strength Against Gravity and Moderate Resistance  LUE Motor Response: Tremors, Responds to commands  LUE Sensation: Full sensation  Muscle Strength Left Arm: Normal Strength Against Gravity and Full Resistance  RLE Motor Response: Responds to commands  RLE Sensation: Numbness  Muscle Strength Right Leg: Fair Strength against Gravity but No Resistance  LLE Motor Response: Responds to commands  LLE Sensation: Full sensation  Muscle Strength Left Leg: Normal Strength Against Gravity and Full Resistance  EENT (WDL):  WDL Except    Cardio/Pulmonary Assessment  Edema      Respiratory Breath Sounds  RUL Breath Sounds: Clear  RML Breath Sounds: Clear  RLL Breath Sounds: Clear  MONIKA Breath Sounds: Clear  LLL Breath Sounds: Clear  Cardiac Assessment   Cardiac (WDL):  WDL Except

## 2023-08-21 NOTE — THERAPY
Physical Therapy   Discharge Summary     Patient Name: Doreen Noe  Age:  67 y.o., Sex:  female  Medical Record #: [id number]  Today's Date: 8/21/2023     Precautions  Precautions: Fall Risk  Comments: R hemiparesis, hx of seizures and L side CVA    Subjective    Pt was seen twice at 1031 and 1415 for 75 min total of tx.    1031: Pt received seated EOB, agreeable to participate.     1415: Pt received seated EOB packing her belongings, agreeable to participate.      Objective       08/21/23 1031 08/21/23 1415   PT Charge Group   PT Gait Training (Units)  --  2   PT Neuromuscular Re-Education / Balance (Units) 2  --    PT Therapeutic Activities (Units)  --  1   PT Total Time Spent   PT Individual Total Time Spent (Mins) 30 45   Gait Functional Level of Assist    Gait Level Of Assist Standby Assist  (c/ 1 LOB to the R at end of sessoin requiring min A to correct) Standby Assist   Assistive Device Single Point Cane Single Point Cane;Other (Comments)  (B trekking poles)   Distance (Feet) 150 1000   # of Times Distance was Traveled 2 1  (mostly outdoors over sidewalk, curbs, grass, inclines/ramps c/ trekking poles; plus 230 ft outdoors c/ trekking poles and 120 ft x2 indoors c/ trekking poles vs SPC)   Deviation Decreased Base Of Support;Bradykinetic;Decreased Heel Strike;Decreased Toe Off Decreased Base Of Support;Bradykinetic;Decreased Heel Strike;Decreased Toe Off   Transfer Functional Level of Assist   Bed, Chair, Wheelchair Transfer  --  Standby Assist   Bed Chair Wheelchair Transfer Description  --  Adaptive equipment;Increased time;Supervision for safety  (stand step SPC vs trekking poles)   Bed Mobility    Sit to Stand Standby Assist Supervised   [place] Balance Scale   Sitting Unsupported (Score 0-4) 4  --    Change Of Positon: Sitting To Standing (Score 0-4) 3  --    Change Of Positon: Standing To Sitting (Score 0-4) 4  --    Transfers (Score 0-4) 4  --    Standing Unsupported (Score 0-4) 4  --    Standing With  Eyes Closed (Score 0-4) 4  --    Standing With Feet Together (Score 0-4) 4  --    Tandem Standing (Score 0-4) 3  (R foot leading)  --    Standing On One Leg (Score 0-4) 2  (lifting R foot)  --    Turning Trunk (Feet Fixed) (Score 0-4) 4  --    Retrieving Objects From Floor (Score 0-4) 3  --    Turning 360 Degrees (Score 0-4) 3  (faster when turning to the L)  --    Stool Stepping (Score 0-4) 0  (R sided LOB during 2nd step requiring min A to correct)  --    Reaching Forward While Standing (Score 0-4) 3  --    [place] Balance Total Score (0-56) 45  --    Interdisciplinary Plan of Care Collaboration   Patient Position at End of Therapy Seated;Edge of Bed;Bed Alarm On;Call Light within Reach;Tray Table within Reach;Phone within Reach Seated;Edge of Bed;Bed Alarm On;Call Light within Reach;Tray Table within Reach;Phone within Reach     1031:  Delivered and verbally reviewed fall prevention/home safety packet.    1415:  Reviewed passport  Delivered and verbally reviewed standing balance HEP packet which includes:  Static standing feet together c/ UE support  Static standing tandem stance c/ UE support  Static standing on single leg c/ UE support  Sidestepping c/ UE support  Tandem walking c/ UE support  Reaching forw c/ BUE while standing on pillow    Assessment    Pt denies any further questions for this therapist at this time. Pt has demo'ed significant improvement in her balance but still has intermittent minor LOB when amb, thus recommend that pt has SPV c/ functional mobility upon d/c home. Pt has progressed in therapy as above and is appropriate for anticipated d/c to home on 08/22/23 with family support and OPPT.    Strengths: Able to follow instructions, Effective communication skills, Good insight into deficits/needs, Independent prior level of function, Pleasant and cooperative, Motivated for self care and independence  Barriers: Decreased endurance, Fatigue, Generalized weakness, Hemiparesis, Home accessibility,  Impaired activity tolerance, Impaired balance, Spasticity    Plan    Anticipated d/c to home on 08/22/23 with family support and OPPT    Passport items to be completed: Completed    Physical Therapy Problems (Active)       There are no active problems.

## 2023-08-22 VITALS
BODY MASS INDEX: 22.15 KG/M2 | HEIGHT: 67 IN | OXYGEN SATURATION: 92 % | RESPIRATION RATE: 18 BRPM | TEMPERATURE: 98.1 F | WEIGHT: 141.09 LBS | HEART RATE: 71 BPM | DIASTOLIC BLOOD PRESSURE: 72 MMHG | SYSTOLIC BLOOD PRESSURE: 126 MMHG

## 2023-08-22 PROCEDURE — 700102 HCHG RX REV CODE 250 W/ 637 OVERRIDE(OP): Performed by: PHYSICAL MEDICINE & REHABILITATION

## 2023-08-22 PROCEDURE — 99239 HOSP IP/OBS DSCHRG MGMT >30: CPT | Performed by: PHYSICAL MEDICINE & REHABILITATION

## 2023-08-22 PROCEDURE — A9270 NON-COVERED ITEM OR SERVICE: HCPCS | Performed by: PHYSICAL MEDICINE & REHABILITATION

## 2023-08-22 RX ADMIN — OMEPRAZOLE 20 MG: 20 CAPSULE, DELAYED RELEASE ORAL at 09:33

## 2023-08-22 RX ADMIN — LOSARTAN POTASSIUM 50 MG: 50 TABLET, FILM COATED ORAL at 05:27

## 2023-08-22 RX ADMIN — SENNOSIDES AND DOCUSATE SODIUM 2 TABLET: 50; 8.6 TABLET ORAL at 09:34

## 2023-08-22 RX ADMIN — APIXABAN 5 MG: 5 TABLET, FILM COATED ORAL at 09:33

## 2023-08-22 ASSESSMENT — PAIN DESCRIPTION - PAIN TYPE: TYPE: ACUTE PAIN

## 2023-08-22 NOTE — CARE PLAN
Problem: Knowledge Deficit - Standard  Goal: Patient and family/care givers will demonstrate understanding of plan of care, disease process/condition, diagnostic tests and medications  Outcome: Progressing   Pt education given regarding plan of care with emphasis on adequate hydration, pt shows good understanding, will continue to reinforce education and continue to monitor.         Problem: Fall Risk - Rehab  Goal: Patient will remain free from falls  Outcome: Progressing   Pt education given regarding fall precautions AND safety measures, pt shows some understanding, has attempted to self transfer this shift, all fall precautions are in place, will continue to reinforce education and continue to monitor.

## 2023-08-22 NOTE — PROGRESS NOTES
NURSING DAILY NOTE    Name: Doreen Noe   Date of Admission: 8/4/2023   Admitting Diagnosis: Ischemic stroke (HCC)  Attending Physician: Leona Pleitez M.d.  Allergies: Patient has no known allergies.    Safety  Patient Assist  sba  Patient Precautions  Fall Risk  Precaution Comments  R hemiparesis, hx of seizures and L side CVA  Bed Transfer Status  Standby Assist  Toilet Transfer Status   Standby Assist  Assistive Devices  Rails, Wheelchair  Oxygen  None - Room Air  Diet/Therapeutic Dining  Current Diet Order   Procedures    Diet Order Diet: Regular; Tray Modifications (optional): SLP - Deliver to Nursing Station     Pill Administration  whole and one at a time   Agitated Behavioral Scale     ABS Level of Severity       Fall Risk  Has the patient had a fall this admission?   No  Mariela Rodgers Fall Risk Scoring  15, HIGH RISK  Fall Risk Safety Measures  bed alarm, chair alarm, and seatbelt alarm    Vitals  Temperature: 36.6 °C (97.8 °F)  Temp src: Oral  Pulse: 86  Respiration: 18  Blood Pressure : 129/66  Blood Pressure MAP (Calculated): 87 MM HG  BP Location: Left, Upper Arm  Patient BP Position: Sitting     Oxygen  Pulse Oximetry: 96 %  O2 (LPM): 0  O2 Delivery Device: None - Room Air    Bowel and Bladder  Last Bowel Movement  08/19/23 (per pt)  Stool Type  Type 4: Like a sausage or snake, smooth and soft, Patient stated  Bowel Device  Bathroom  Continent  Bladder: Continent void   Bowel: Continent movement  Bladder Function  Urine Void (mL):  (moderate)  Number of Times Voided: 1  Urinary Options: Yes  Urine Color: Yellow  Genitourinary Assessment   Bladder Assessment (WDL):  WDL Except  Engel Catheter: Not Applicable  Urine Color: Yellow  Bladder Device: Bathroom  Bladder Scan: Post Void  $ Bladder Scan Results (mL): 13    Skin  Agnus Score   18  Sensory Interventions   Bed Types: Standard/Trauma Mattress  Skin Preventative Measures: Pillows in  Use for Support / Positioning  Moisture Interventions  Moisturizers/Barriers: Barrier Wipes      Pain  Pain Rating Scale  0 - No Pain  Pain Location  Neck  Pain Location Orientation     Pain Interventions   Declines    ADLs    Bathing    (shower done in AM shift)  Linen Change   Complete  Personal Hygiene  Moist Preeti Wipes, Perineal Care  Chlorhexidine Bath      Oral Care  Brushed Teeth  Teeth/Dentures     Shave     Nutrition Percentage Eaten  *  * Meal *  *, Dinner, Between % Consumed  Environmental Precautions  Treaded Slipper Socks on Patient, Bed in Low Position  Patient Turns/Positioning  Patient Turns Self from Side to Side  Patient Turns Assistance/Tolerance  Standby Assist  Bed Positions  Bed Controls On, Bed Locked  Head of Bed Elevated  Self regulated      Psychosocial/Neurologic Assessment  Psychosocial Assessment  Psychosocial (WDL):  Within Defined Limits  Neurologic Assessment  Neuro (WDL): Exceptions to WDL  Level of Consciousness: Alert  Orientation Level: Oriented X4  Cognition: Appropriate judgement, Appropriate safety awareness, Appropriate attention/concentration, Appropriate for developmental age, Follows commands  Speech: Clear  Pupil Assesment: Yes  R Pupil Size (mm): 3  R Pupil Shape / Description: Round  R Pupil Reaction: Brisk  L Pupil Size (mm): 3  L Pupil Shape / Description: Round  L Pupil Reaction: Brisk  Motor Function/Sensation Assessment: Dorsiflexion, Motor response, Sensation, Motor strength  R Foot Dorsiflexion: Weak  L Foot Dorsiflexion: Strong  RUE Motor Response: Responds to commands  RUE Sensation: Full sensation  Muscle Strength Right Arm: Good Strength Against Gravity and Moderate Resistance  LUE Motor Response: Tremors, Responds to commands  LUE Sensation: Full sensation  Muscle Strength Left Arm: Normal Strength Against Gravity and Full Resistance  RLE Motor Response: Responds to commands  RLE Sensation: Numbness  Muscle Strength Right Leg: Weak Movement but Not  Against Gravity or Resistance  LLE Motor Response: Responds to commands  LLE Sensation: Full sensation  Muscle Strength Left Leg: Normal Strength Against Gravity and Full Resistance  EENT (WDL):  WDL Except    Cardio/Pulmonary Assessment  Edema      Respiratory Breath Sounds  RUL Breath Sounds: Clear  RML Breath Sounds: Clear  RLL Breath Sounds: Clear  MONIKA Breath Sounds: Clear  LLL Breath Sounds: Clear  Cardiac Assessment   Cardiac (WDL):  WDL Except

## 2023-08-22 NOTE — DISCHARGE PLANNING
TC [facility] [place]. They now state they need chart notes for single point care from DR EDMONDS.   I advised pt/so to purchase one privately as authoriztion has not been obtained as of yet.

## 2023-08-22 NOTE — DISCHARGE SUMMARY
"Admission Date: 8/4/2023    Discharge Date: 8/22/2023    Attending Provider: Leona Pleitez MD    Admission Diagnosis:   Active Hospital Problems    Diagnosis     *Ischemic stroke (HCC)     Grade I diastolic dysfunction     Tremor     Neuropathy     HTN (hypertension)     Seizure disorder (HCC)     Other hyperlipidemia        Discharge Diagnosis:  Active Hospital Problems    Diagnosis     *Ischemic stroke (HCC)     Grade I diastolic dysfunction     Tremor     Neuropathy     HTN (hypertension)     Seizure disorder (HCC)     Other hyperlipidemia        HPI per H&P:    Per Dr. Dean's consult, \"The patient is a 67 y.o.  female with a past medical history of seizure disorder, prior right brain stroke, chronic tremors, and neuropathy;  who presented on 8/1/2023  8:51 AM with new onset Right sided weakness. Per documentation, patient awoke with right sided weakness and rolled of her bed. She did not hit head or LOC. 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 Neuro consulted, patient outside the window for TNKase and was not a candidate for IR intervention given no LVO. MRI brain was obtained that showed small acute ischemic stroke in the L CAROL ANN territory. Echo is pending. Neuro recommending starting eliquis and statin for secondary stroke ppx. BP goals is 100-130/60-80.\"     HgbA1c 5.7, , ECHO EF 65 %, with grade 1 diastolic dysfunction. She had a Ziopatch cardiac monitor placed.      Patient currently reports right leg weakness, worse since she first got to the hospital. She also reports abnormal sensation in her right leg.      She has a 13 year history of seizure disorder for which she follows with a neurologist in [place]. Her last seizure was in November 2022 and was clonic-tonic. She also has a chronic tremor, left hand worse than right.      She had completed recovery from her " right brain stroke that occurred back in 2016.      Patient was evaluated by Rehab Medicine physician and Physical Therapy, Occupational Therapy, and Speech Therapy and determined to be appropriate for acute inpatient rehab and was transferred to [facility] on 8/4/2023  4:53 PM.    Rehab Hospital Course by Problem List:    L CAROL ANN stroke  Right hemiparesis, improved  Right neglect, improved  Mild cognitive impairment   Zio patch returned  Continue Eliquis and atorvastatin     Seizure disorder  Last seizure November 2022  Generalized tonic-clonic  Continue lamotrigine     Tremor, left-sided, chronic  History of right MCA stroke  Outpatient referral to movement disorder as needed     Hypertension, resolved  Continue losartan     Rash, resolved  Looks like rosacea  Given patient handout information  Outpatient follow-up with dermatology as needed     Hyperlipidemia  Continue atorvastatin     GI prophylaxis  Continue omeprazole while on Eliquis     DVT prophylaxis  Continue Eliquis    Functional Status at Discharge  Eating:  Supervision  Eating Description:  Increased time  Grooming:  Modified Independent  Grooming Description:  Increased time, Seated in wheelchair at sink (apply face cream, brush teeth and hair)  Bathing:  Supervision  Bathing Description:  Grab bar, Hand held shower, Tub bench, Increased time, Initial preparation for task, Set-up of equipment, Supervision for safety, Verbal cueing (cues for sitting during LB bathing tasks for safety)  Upper Body Dressing:  Modified Independent (for bra and shirt)  Upper Body Dressing Description:  Increased time (doff/don pull over shirt and bra seated in w/c)  Lower Body Dressing:  Standby Assist (to retrieve and don underwear, pants, socks and shoes while incorporating sitting and standing)  Lower Body Dressing Description:  Grab bar, Increased time, Initial preparation for task, Set-up of equipment, Supervision for safety, Verbal cueing (cues  needed to have pt sit during LB- attempting to don/doff underwear and shorts in standing holding on with 1 hand to grab bar)  Discharge Location : Home  Patient Discharging with Assist of: Family  (family/SO to provide assist as needed following DC from rehab)  Level of Supervision Required: Intermittent Supervision (supervision initially recommended primarily for standing tasks/showers for safety/balance)  Recommended Equipment for Discharge: Single Point Cane;Tub Transfer Bench;Grab Bars by Toilet;Grab Bars in Tub / Shower;Hand Held Shower Head  Recommended Services Upon Discharge: Outpatient Occupational Therapy  Long Term Goals Met: 3  Long Term Goals Not Met: 0  Criteria for Termination of Services: Maximum Function Achieved for Inpatient Rehabilitation  Walk:  Standby Assist  Distance Walked:  1000  Number of Times Distance Was Traveled:  1 (mostly outdoors over sidewalk, curbs, grass, inclines/ramps c/ trekking poles; plus 230 ft outdoors c/ trekking poles and 120 ft x2 indoors c/ trekking poles vs SPC)  Assistive Device:  Single Point Cane, Other (Comments) (B trekking poles)  Gait Deviation:  Decreased Base Of Support, Bradykinetic, Decreased Heel Strike, Decreased Toe Off  Wheelchair:  Supervised  Distance Propelled:  80   Wheelchair Description:  Extra time, Impaired coordination, Supervision for safety (BUE/BLE propulsion)  Stairs Standby Assist  Stairs Description Extra time, Hand rails, Supervision for safety, Verbal cueing (step through progressing to step to asc/desc)  Discharge Location: Home  Patient Discharging with Assist of: Family;Spouse / Significant Other  Level of Supervision Required Upon Discharge: Intermittent Supervision  Recommended Equipment for Discharge: Single Point Cane  Recommeded Services Upon Discharge: Outpatient Physical Therapy  Long Term Goals Met: 4  Long Term Goals Not Met: 0  Reason(s) for Goals Not Met: n/a  Criteria for Termination of Services: Maximum Function Achieved  for Inpatient Rehabilitation  Comprehension:  Modified Independent  Comprehension Description:  Glasses  Expression:  Independent  Expression Description:     Social Interaction:  Independent  Social Interaction Description:     Problem Solving:  Modified Independent  Problem Solving Description:  Increased time  Memory:  Modified Independent  Memory Description:  Increased time  Progress since Admit: Patient has made good progress toward goals.  She is mod I for medication mangagement, and recall and safety problem solving.  Discharge Location : Home  Patient Discharging with Assist of: No one, Patient will be Alone;Other (See Comments) (family checking in.  Patient will continue with PT/OT services at this facility until time of discharge.)  Level of Supervision Required: Other (See Comments) (Suggest family provide intermittent supervision with complex health care or financial management.)  Recommended Services Upon Discharge: No Follow-Up Speech Therapy Recommended  Long Term Goals Met: Patient has made good progress toward goals.  She is mod I for medication mangagement, and recall and safety problem solving.  Criteria for Termination of Services: Maximum Function Achieved for Inpatient Rehabilitation    I, Leona Pleitez M.D., personally performed a complete drug regimen review and no potential clinically significant medication issues were identified.     Discharge Medication:     Medication List        START taking these medications        Instructions   omeprazole 20 MG delayed-release capsule  Commonly known as: PriLOSEC   Take 1 Capsule by mouth every morning before breakfast.  Dose: 20 mg            CHANGE how you take these medications        Instructions   losartan 50 MG Tabs  What changed:   medication strength  how much to take  Commonly known as: Cozaar   Take 1 Tablet by mouth every day.  Dose: 50 mg            CONTINUE taking these medications        Instructions   atorvastatin 80 MG  tablet  Commonly known as: Lipitor   Take 1 Tablet by mouth every evening.  Dose: 80 mg     Eliquis 5mg Tabs  Generic drug: apixaban   Take 1 Tablet by mouth 2 times a day. Indications: Thromboembolism secondary to Atrial Fibrillation  Dose: 5 mg     fish oil 1000 MG Caps capsule   Take 1,000 mg by mouth every day.  Dose: 1,000 mg     lamotrigine 150 MG tablet  Commonly known as: LaMICtal   Take 150 mg by mouth every day.  Dose: 150 mg     multivitamin Tabs   Take 1 Tablet by mouth every day.  Dose: 1 Tablet     VITAMIN A PO   Take 1 Tablet by mouth every day.  Dose: 1 Tablet     VITAMIN B COMPLEX PO   Take 1 Tablet by mouth every day.  Dose: 1 Tablet     VITAMIN C PO   Take 1 Tablet by mouth every day.  Dose: 1 Tablet     VITAMIN D PO   Take 1 Tablet by mouth every day.  Dose: 1 Tablet              Discharge Diet:  Regular    Discharge Activity:  Do not return to work or driving until cleared by a physician.         Disposition:  Patient to discharge home with family support and community resources.     Equipment:  Follow-up & Discharge Instructions:  Outpatient PT/OT     Equip: SPC     Follow-ups: PCP, stroke bridge, cardiology, neurology      Rx: Meds to beds     Condition on Discharge:  Good    More than 35 minutes was spent on discharging this patient, including face-to-face time, prescription management, and the dictation of this note.    Leona Pleitez M.D.    Date of Service: 8/22/2023

## 2023-08-22 NOTE — PROGRESS NOTES
NURSING DAILY NOTE    Name: Doreen Noe   Date of Admission: 8/4/2023   Admitting Diagnosis: Ischemic stroke (HCC)  Attending Physician: Leona Pleitez M.d.  Allergies: Patient has no known allergies.    Safety  Patient Assist  sba  Patient Precautions  Fall Risk  Precaution Comments  R hemiparesis, hx of seizures and L side CVA  Bed Transfer Status  Standby Assist  Toilet Transfer Status   Standby Assist  Assistive Devices  Rails, Wheelchair  Oxygen  None - Room Air  Diet/Therapeutic Dining  Current Diet Order   Procedures    Diet Order Diet: Regular; Tray Modifications (optional): SLP - Deliver to Nursing Station     Pill Administration  whole  Agitated Behavioral Scale     ABS Level of Severity       Fall Risk  Has the patient had a fall this admission?   No  Mariela Rodgers Fall Risk Scoring  15, HIGH RISK  Fall Risk Safety Measures  bed alarm and chair alarm    Vitals  Temperature: 37.1 °C (98.8 °F)  Temp src: Temporal  Pulse: 78  Respiration: 18  Blood Pressure : 135/82  Blood Pressure MAP (Calculated): 100 MM HG  BP Location: Left, Upper Arm  Patient BP Position: Supine     Oxygen  Pulse Oximetry: 97 %  O2 (LPM): 0  O2 Delivery Device: None - Room Air    Bowel and Bladder  Last Bowel Movement  08/19/23 (per pt)  Stool Type  Type 4: Like a sausage or snake, smooth and soft, Patient stated  Bowel Device  Bathroom  Continent  Bladder: Continent void   Bowel: Continent movement  Bladder Function  Urine Void (mL):  (moderate)  Number of Times Voided: 1  Urinary Options: Yes  Urine Color: Unable To Evaluate  Genitourinary Assessment   Bladder Assessment (WDL):  WDL Except  Engel Catheter: Not Applicable  Urine Color: Unable To Evaluate  Bladder Device: Bathroom  Bladder Scan: Post Void  $ Bladder Scan Results (mL): 13    Skin  Angus Score   18  Sensory Interventions   Bed Types: Standard/Trauma Mattress  Skin Preventative Measures: Pillows in Use for  Home today, Tylenol, Motrin or Aspirin for pain.  Leave bandage on tonight, we will take it off in the office.  Should have appointment in office for tomorrow.      Discharge Instructions: After Your Surgery  You’ve just had surgery. During surgery, you were given medicine called anesthesia to keep you relaxed and free of pain. After surgery, you may have some pain or nausea. This is common. Here are some tips for feeling better and getting well after surgery.     Stay on schedule with your medicine.   Going home  Your healthcare provider will show you how to take care of yourself when you go home. He or she will also answer your questions. Have an adult family member or friend drive you home. For the first 24 hours after your surgery:  · Don't drive or use heavy equipment.  · Don't make important decisions or sign legal papers.  · Don't drink alcohol.  · Have someone stay with you. He or she can watch for problems and help keep you safe.  Be sure to go to all follow-up visits with your healthcare provider. And rest after your surgery for as long as your healthcare provider tells you to.  Coping with pain  If you have pain after surgery, pain medicine will help you feel better. Take it as told, before pain becomes severe. Also, ask your healthcare provider or pharmacist about other ways to control pain. This might be with heat, ice, or relaxation. And follow any other instructions your surgeon or nurse gives you.  Tips for taking pain medicine  To get the best relief possible, remember these points:  · Pain medicines can upset your stomach. Taking them with a little food may help.  · Most pain relievers taken by mouth need at least 20 to 30 minutes to start to work.  · Don't wait till your pain becomes severe before you take your medicine. Try to time your medicine so that you can take it before starting an activity. This might be before you get dressed, go for a walk, or sit down for dinner.  · Constipation is a  Support / Positioning  Moisture Interventions  Moisturizers/Barriers: Barrier Wipes      Pain  Pain Rating Scale  0 - No Pain  Pain Location  Neck  Pain Location Orientation     Pain Interventions   Declines    ADLs    Bathing    (shower done in AM shift)  Linen Change   Complete  Personal Hygiene  Moist Preeti Wipes, Perineal Care  Chlorhexidine Bath      Oral Care  Brushed Teeth  Teeth/Dentures     Shave     Nutrition Percentage Eaten  *  * Meal *  *, Dinner, Between % Consumed  Environmental Precautions  Treaded Slipper Socks on Patient  Patient Turns/Positioning  Patient Turns Self from Side to Side  Patient Turns Assistance/Tolerance  Standby Assist  Bed Positions  Bed Controls On, Bed Locked  Head of Bed Elevated  Self regulated      Psychosocial/Neurologic Assessment  Psychosocial Assessment  Psychosocial (WDL):  Within Defined Limits  Neurologic Assessment  Neuro (WDL): Exceptions to WDL  Level of Consciousness: Alert  Orientation Level: Oriented X4  Cognition: Appropriate judgement, Appropriate safety awareness, Appropriate attention/concentration, Appropriate for developmental age, Follows commands  Speech: Clear  Pupil Assesment: Yes  R Pupil Size (mm): 3  R Pupil Shape / Description: Round  R Pupil Reaction: Brisk  L Pupil Size (mm): 3  L Pupil Shape / Description: Round  L Pupil Reaction: Brisk  Motor Function/Sensation Assessment: Dorsiflexion, Motor response, Sensation, Motor strength  R Foot Dorsiflexion: Weak  L Foot Dorsiflexion: Strong  RUE Motor Response: Responds to commands  RUE Sensation: Full sensation  Muscle Strength Right Arm: Good Strength Against Gravity and Moderate Resistance  LUE Motor Response: Tremors, Responds to commands  LUE Sensation: Full sensation  Muscle Strength Left Arm: Normal Strength Against Gravity and Full Resistance  RLE Motor Response: Responds to commands  RLE Sensation: Numbness  Muscle Strength Right Leg: Weak Movement but Not Against Gravity or  common side effect of pain medicines. Call your healthcare provider before taking any medicines such as laxatives or stool softeners to help ease constipation. Also ask if you should skip any foods. Drinking lots of fluids and eating foods such as fruits and vegetables that are high in fiber can also help. Remember, don't take laxatives unless your surgeon has prescribed them.  · Drinking alcohol and taking pain medicine can cause dizziness and slow your breathing. It can even be deadly. Don't drink alcohol while taking pain medicine.  · Pain medicine can make you react more slowly to things. Don't drive or run machinery while taking pain medicine.  Your healthcare provider may tell you to take acetaminophen to help ease your pain. Ask him or her how much you are supposed to take each day. Acetaminophen or other pain relievers may interact with your prescription medicines or other over-the-counter (OTC) medicines. Some prescription medicines have acetaminophen and other ingredients. Using both prescription and OTC acetaminophen for pain can cause you to overdose. Read the labels on your OTC medicines with care. This will help you to clearly know the list of ingredients, how much to take, and any warnings. It may also help you not take too much acetaminophen. If you have questions or don't understand the information, ask your pharmacist or healthcare provider to explain it to you before you take the OTC medicine.  Managing nausea  Some people have an upset stomach after surgery. This is often because of anesthesia, pain, or pain medicine, or the stress of surgery. These tips will help you handle nausea and eat healthy foods as you get better. If you were on a special food plan before surgery, ask your healthcare provider if you should follow it while you get better. These tips may help:  · Don't push yourself to eat. Your body will tell you when to eat and how much.  · Start off with clear liquids and soup. They are  Resistance  LLE Motor Response: Responds to commands  LLE Sensation: Full sensation  Muscle Strength Left Leg: Normal Strength Against Gravity and Full Resistance  EENT (WDL):  WDL Except    Cardio/Pulmonary Assessment  Edema      Respiratory Breath Sounds  RUL Breath Sounds: Clear  RML Breath Sounds: Clear  RLL Breath Sounds: Clear  MONIKA Breath Sounds: Clear  LLL Breath Sounds: Clear  Cardiac Assessment   Cardiac (WDL):  WDL Except        easier to digest.  · Next try semi-solid foods, such as mashed potatoes, applesauce, and gelatin, as you feel ready.  · Slowly move to solid foods. Don’t eat fatty, rich, or spicy foods at first.  · Don't force yourself to have 3 large meals a day. Instead eat smaller amounts more often.  · Take pain medicines with a small amount of solid food, such as crackers or toast, to prevent nausea.  When to call your healthcare provider  Call your healthcare provider if:  · You still have intolerable pain an hour after taking medicine. The medicine may not be strong enough.  · You feel too sleepy, dizzy, or groggy. The medicine may be too strong.  · You have side effects such as nausea or vomiting, or skin changes such as rash, itching, or hives. Your healthcare provider may suggest other medicines to control side effects.  Rash, itching, or hives may mean you have an allergic reaction. Report this right away. If you have trouble breathing or facial swelling, call 911 right away.  If you have obstructive sleep apnea  You were given anesthesia medicine during surgery to keep you comfortable and free of pain. After surgery, you may have more apnea spells because of this medicine and other medicines you were given. The spells may last longer than usual.   At home:  · Keep using the continuous positive airway pressure (CPAP) device when you sleep. Unless your healthcare provider tells you not to, use it when you sleep, day or night. CPAP is a common device used to treat obstructive sleep apnea.  · Talk with your provider before taking any pain medicine, muscle relaxants, or sedatives. Your provider will tell you about the possible dangers of taking these medicines.  [employer] last reviewed this educational content on 3/1/2019  © 6452-9387 [employer]. [address] [place] [address], [place] [address]. All rights reserved. This information is not intended as a substitute for professional medical care. Always follow your  healthcare professional's instructions.      Preventing Surgical Site Infections     Hand washing reduces the risk of infection.   One risk of having surgery is an infection at the surgical site. The surgical site is any cut the surgeon makes in the skin to do the operation. Surgical site infections can range from minor skin infections to severe or even fatal ones involving tissue under the skin or organs. This sheet tells you more about surgical site infections, what hospitals are doing to prevent them, and how they are treated if they do occur. It also tells you what you can do to prevent these infections.  What causes surgical site infections?  Germs are everywhere. They’re on your skin, in the air, and on things you touch. Many germs are good. Some are harmful. Surgical site infections occur when harmful germs enter your body through the incision in your skin. Some infections are caused by germs that are in the air or on objects. But most are caused by germs found on and in your own body.  Who is at risk for surgical site infections?  Anyone can have a surgical site infection. Your risk is greater if you:  · Are an older adult  · Have a weak immune system or other health conditions or illnesses such as diabetes  · Take certain medicines such as steroids  · Are a smoker  · Have certain types of operations, such as abdominal surgery  · Have poor nutrition  · Are very overweight  · Have an operation that lasts longer than 2 hours  What are the symptoms of a surgical site infection?  · The infection usually starts with increased skin redness, pain, and swelling around the incision. Later you may notice a cloudy or greenish-yellow discharge from the incision and it may develop a foul odor. The incision may separate or open up. You are also likely to have a fever and may feel very ill.  · Symptoms can appear any time from hours to weeks after surgery. Implants such as an artificial knee or hip can become infected at  any time after the operation.    How are surgical site infections treated?  · Surgical site infections are treated with antibiotics. The type of medicine you get will depend on the germ thought to be causing the infection. Most serious wound infections need local wound care, and in some cases, further surgery.  · An infected skin wound may be reopened and cleaned. Sometimes, deep wounds need to be packed with gauze that is changed often until the wound starts to heal from the inside out. Your healthcare provider will figure out the best care needed to treat your surgical site infection.  · If an infection occurs where an implant is placed, the implant may be removed.  · If you have an infection deeper in your body, you may need another operation to treat it.    What hospitals do to prevent surgical site infections  Many hospitals take these steps to help prevent surgical site infections:  · Handwashing. Before the operation, your surgeon and all operating room staff scrub their hands and arms with an antiseptic soap.  · Clean skin. The site where your incision is made is carefully cleaned with an antiseptic solution.  · Sterile clothing and drapes. Members of your surgical team wear medical uniforms (scrub suits), long-sleeved surgical gowns, masks, caps, shoe covers, and sterile gloves. Your body is fully covered with a large sterile sheet (sterile drape) except for the spot where the incision is made.  · Clean air. Operating rooms have special air filters and positive pressure airflow to prevent unfiltered air from entering the room.  · Careful use of antibiotics. Antibiotics are given no more than 60 minutes before the incision is made and generally stopped within 24 hours after surgery, depending on the type of surgery. This helps kill germs but prevents problems that can occur when antibiotics are taken longer.  · Controlled blood sugar levels. Your blood sugar level may rise because of the stress of the  surgery. Your blood sugar level is watched closely to make sure it stays within a normal range. High blood sugar delays wound healing and increases the chances for infection.  · Controlled body temperature. A lower-than-normal temperature during or after surgery prevents oxygen from reaching the wound and makes it harder for your body to fight infection. Hospitals may warm IV fluids, increase the temperature in the operating room, and provide warm-air blankets.  · Proper hair removal. Any hair that must be removed is clipped right before the incision, not shaved with a razor. This prevents tiny nicks and cuts through which germs can enter.  · Wound care. After surgery, a closed wound is covered with a sterile dressing for a day or two. Open wounds are packed with sterile gauze and covered with a sterile dressing.  What you can do to prevent surgical site infections  · Ask questions. Learn what your hospital is doing to prevent infection.  · If your doctor tells you to, shower or bathe with plain soap the night before and the day of your operation. Follow the instructions you are given. You may be asked to use a special cleanser that you don’t rinse off.  · If you smoke, stop for the longest duration possible before and after the operation. Ask your doctor about ways to quit.  · Take antibiotics only when your healthcare provider tells you to. Using antibiotics when they’re not needed can create germs that are harder to kill. Also, finish the entire prescription of your antibiotics, even if you feel better.  · Be sure healthcare workers clean their hands with plain soap and water or with an alcohol-based hand  before and after caring for you. Don’t be afraid to remind them.  · After surgery, eat healthy foods. Care for your incision as directed by your doctor or nurse.    When to call your healthcare provider  Call your healthcare provider if you have any of the following:  · Increased soreness, pain, or  tenderness at the surgical site  · A red streak, increased redness, or puffiness near the incision  · Yellowish, cloudy, or bad-smelling discharge from the incision  · Stitches that dissolve before the wound heals  · Fever of 100.4°F (38°C) or higher, or as directed by your healthcare provider  · A tired feeling that doesn’t go away  [employer] last reviewed this educational content on 6/1/2019 © 2000-2021 [employer] All rights reserved. This information is not intended as a substitute for professional medical care. Always follow your healthcare professional's instructions.      Discharge Instructions for Cataract Surgery  A surgeon removed the cloudy lens in your eye and replaced it with a clear man-made lens. Be sure to have an adult family member or friend drive you home after surgery. Here’s what you can expect following surgery and tips for a healthy recovery.  What to expect  It is normal to have the following:  · Bruised or bloodshot eye for 7 days  · Itching and mild discomfort for several days  · Some fluid discharge  · Sensitivity to light  · Scratchy, sandlike feeling in the eye for 2 weeks  · Feeling tired, especially during the first 24 hours  Activity level  · Do not drive for 2 days or as instructed by your eye care provider.  · Do not drink alcohol for at least 24 hours.  · Avoid bending at the waist to  objects or lifting anything heavy for 2 days.  · Relax for the first 24 hours after surgery. Watching TV and reading are OK and won’t harm your eye.  Eye protection  · Do not rub or press on your eye.  · Sleep on your back or on your unoperated side for 2 nights.  · If instructed, wear a bandage over your eye for 2 days and 2 nights.  · If instructed, wear a shield to protect your eye for 2 days and 2 nights.  Using eye drops  You may be given special eye drops or ointment. Here is one way to use eye drops:  · Tilt your head back.  · Pull your bottom eyelid down.  · Squeeze one  drop into your eye. Do not touch your eye with the bottle tip.  · Close your eyes for a few seconds.  · If you need more than one drop, wait at least 5 minutes before adding the next one.   Call your eye care provider right away if you have any of the following:  · Bleeding or discharge from the eye  · Sudden worsening of your vision.  · Pain that does not improve with the pain medicine that is recommended for you.  · Nausea or vomiting  · Chills or fever over 100.4°F (39.1°C)   Date Last Reviewed: 10/1/2017  © 9247-1978 [employer]. [address] [place] [address]. All rights reserved. This information is not intended as a substitute for professional medical care. Always follow your healthcare professional's instructions.

## 2023-08-22 NOTE — CARE PLAN
"The patient is Stable - Low risk of patient condition declining or worsening    Problem: Fall Risk - Rehab  Goal: Patient will remain free from falls  Outcome: Progressing   Mariela Rodgers Fall risk Assessment Score: 15    High fall risk Interventions   - Alarming seatbelt  - Bed and strip alarm   - Yellow sign by the door   - Yellow wrist band \"Fall risk\"  - Room near to the nurse station  - Do not leave patient unattended in the bathroom  - Fall risk education provided      Problem: Skin Integrity  Goal: Patient's skin integrity will be maintained or improve  Outcome: Progressing   Note: Patient's skin remains intact and free from new or accidental injury this shift.  Will continue to monitor.         "

## 2023-08-22 NOTE — PROGRESS NOTES
NURSING DAILY NOTE    Name: Doreen Noe   Date of Admission: 8/4/2023   Admitting Diagnosis: Ischemic stroke (HCC)  Attending Physician: Leona Pleitez M.d.  Allergies: Patient has no known allergies.    Safety  Patient Assist  sba  Patient Precautions  Fall Risk  Precaution Comments  R hemiparesis, hx of seizures and L side CVA  Bed Transfer Status  Standby Assist  Toilet Transfer Status   Standby Assist  Assistive Devices  Rails, Wheelchair  Oxygen  None - Room Air  Diet/Therapeutic Dining  Current Diet Order   Procedures    Diet Order Diet: Regular; Tray Modifications (optional): SLP - Deliver to Nursing Station     Pill Administration  whole  Agitated Behavioral Scale     ABS Level of Severity       Fall Risk  Has the patient had a fall this admission?   No  Mariela Rodgers Fall Risk Scoring  12, MODERATE RISK  Fall Risk Safety Measures  bed alarm, chair alarm, seatbelt alarm, and poor balance    Vitals  Temperature: 36.7 °C (98.1 °F)  Temp src: Oral  Pulse: 71  Respiration: 18  Blood Pressure : 126/72  Blood Pressure MAP (Calculated): 90 MM HG  BP Location: Left, Upper Arm  Patient BP Position: Supine     Oxygen  Pulse Oximetry: 92 %  O2 (LPM): 0  O2 Delivery Device: None - Room Air    Bowel and Bladder  Last Bowel Movement  08/22/23  Stool Type  Type 4: Like a sausage or snake, smooth and soft  Bowel Device  Bathroom  Continent  Bladder: Continent void   Bowel: Continent movement  Bladder Function  Urine Void (mL):  (MOderate)  Number of Times Voided: 1  Urinary Options: Yes  Urine Color: Yellow  Genitourinary Assessment   Bladder Assessment (WDL):  WDL Except  Engel Catheter: Not Applicable  Urine Color: Yellow  Bladder Device: Bathroom  Bladder Scan: Post Void  $ Bladder Scan Results (mL): 13    Skin  Angus Score   19  Sensory Interventions   Bed Types: Standard/Trauma Mattress  Skin Preventative Measures: Pillows in Use for Support /  Positioning  Moisture Interventions  Moisturizers/Barriers: Barrier Wipes      Pain  Pain Rating Scale  0 - No Pain  Pain Location  Neck  Pain Location Orientation     Pain Interventions   Declines    ADLs    Bathing    (shower done in AM shift)  Linen Change   Complete  Personal Hygiene  Moist Preeti Wipes, Perineal Care  Chlorhexidine Bath      Oral Care  Brushed Teeth  Teeth/Dentures     Shave     Nutrition Percentage Eaten  Breakfast, Between 50-75% Consumed  Environmental Precautions  Treaded Slipper Socks on Patient, Bed in Low Position  Patient Turns/Positioning  Patient Turns Self from Side to Side  Patient Turns Assistance/Tolerance  Standby Assist  Bed Positions  Bed Controls On, Bed Locked  Head of Bed Elevated  Less than 30 degrees      Psychosocial/Neurologic Assessment  Psychosocial Assessment  Psychosocial (WDL):  Within Defined Limits  Neurologic Assessment  Neuro (WDL): Exceptions to WDL  Level of Consciousness: Alert  Orientation Level: Oriented X4  Cognition: Appropriate for developmental age, Follows commands, Poor safety awareness  Speech: Clear  Pupil Assesment: Yes  R Pupil Size (mm): 3  R Pupil Shape / Description: Round  R Pupil Reaction: Brisk  L Pupil Size (mm): 3  L Pupil Shape / Description: Round  L Pupil Reaction: Brisk  Motor Function/Sensation Assessment: Dorsiflexion, Motor response, Sensation, Motor strength  R Foot Dorsiflexion: Weak  L Foot Dorsiflexion: Strong  RUE Motor Response: Responds to commands  RUE Sensation: Full sensation  Muscle Strength Right Arm: Good Strength Against Gravity and Moderate Resistance  LUE Motor Response: Tremors, Responds to commands  LUE Sensation: Full sensation  Muscle Strength Left Arm: Normal Strength Against Gravity and Full Resistance  RLE Motor Response: Responds to commands  RLE Sensation: Numbness  Muscle Strength Right Leg: Weak Movement but Not Against Gravity or Resistance  LLE Motor Response: Responds to commands  LLE Sensation: Full  sensation  Muscle Strength Left Leg: Normal Strength Against Gravity and Full Resistance  EENT (WDL):  WDL Except    Cardio/Pulmonary Assessment  Edema      Respiratory Breath Sounds  RUL Breath Sounds: Clear  RML Breath Sounds: Clear  RLL Breath Sounds: Clear  MONIKA Breath Sounds: Clear  LLL Breath Sounds: Clear  Cardiac Assessment   Cardiac (WDL):  WDL Except

## 2023-08-22 NOTE — CARE PLAN
Problem: Fall Risk - Rehab  Goal: Patient will remain free from falls  Outcome: Progressing     Problem: Mobility  Goal: Patient's capacity to carry out activities will improve  Outcome: Progressing       The patient is Stable - Low risk of patient condition declining or worsening    Shift Goals  Clinical Goals: Safety  Patient Goals: Sleep  Family Goals: Education

## 2023-08-24 ENCOUNTER — TELEPHONE (OUTPATIENT)
Dept: CARDIOLOGY | Facility: MEDICAL CENTER | Age: 68
End: 2023-08-24
Payer: COMMERCIAL

## 2023-08-25 PROCEDURE — 93228 REMOTE 30 DAY ECG REV/REPORT: CPT | Performed by: INTERNAL MEDICINE

## 2023-11-21 PROBLEM — R87.610 ASCUS OF CERVIX WITH NEGATIVE HIGH RISK HPV: Status: ACTIVE | Noted: 2019-05-07

## 2023-11-21 PROBLEM — S49.92XA SHOULDER INJURY, LEFT, INITIAL ENCOUNTER: Status: ACTIVE | Noted: 2019-07-16
